# Patient Record
Sex: MALE | Race: WHITE | Employment: OTHER | URBAN - METROPOLITAN AREA
[De-identification: names, ages, dates, MRNs, and addresses within clinical notes are randomized per-mention and may not be internally consistent; named-entity substitution may affect disease eponyms.]

---

## 2017-01-12 ENCOUNTER — OFFICE VISIT (OUTPATIENT)
Dept: INTERNAL MEDICINE CLINIC | Facility: CLINIC | Age: 67
End: 2017-01-12

## 2017-01-12 VITALS
SYSTOLIC BLOOD PRESSURE: 149 MMHG | RESPIRATION RATE: 18 BRPM | HEART RATE: 62 BPM | DIASTOLIC BLOOD PRESSURE: 88 MMHG | WEIGHT: 238.19 LBS | BODY MASS INDEX: 34.1 KG/M2 | HEIGHT: 70 IN

## 2017-01-12 DIAGNOSIS — Z12.11 COLON CANCER SCREENING: ICD-10-CM

## 2017-01-12 DIAGNOSIS — Z71.89 COUNSELING REGARDING ADVANCED DIRECTIVES: ICD-10-CM

## 2017-01-12 DIAGNOSIS — Z23 NEED FOR VACCINATION: ICD-10-CM

## 2017-01-12 DIAGNOSIS — M19.90 OSTEOARTHRITIS, UNSPECIFIED OSTEOARTHRITIS TYPE, UNSPECIFIED SITE: ICD-10-CM

## 2017-01-12 DIAGNOSIS — E78.2 MIXED HYPERLIPIDEMIA: ICD-10-CM

## 2017-01-12 DIAGNOSIS — F17.290 CIGAR SMOKER: ICD-10-CM

## 2017-01-12 DIAGNOSIS — Z00.00 MEDICARE ANNUAL WELLNESS VISIT, INITIAL: Primary | ICD-10-CM

## 2017-01-12 PROCEDURE — G0438 PPPS, INITIAL VISIT: HCPCS | Performed by: INTERNAL MEDICINE

## 2017-01-12 PROCEDURE — G0009 ADMIN PNEUMOCOCCAL VACCINE: HCPCS | Performed by: INTERNAL MEDICINE

## 2017-01-12 PROCEDURE — 90670 PCV13 VACCINE IM: CPT | Performed by: INTERNAL MEDICINE

## 2017-01-12 PROCEDURE — 99497 ADVNCD CARE PLAN 30 MIN: CPT | Performed by: INTERNAL MEDICINE

## 2017-01-12 RX ORDER — ASPIRIN 81 MG/1
81 TABLET ORAL DAILY
Qty: 30 TABLET | Refills: 11 | Status: ON HOLD | OUTPATIENT
Start: 2017-01-12 | End: 2020-03-01

## 2017-01-12 NOTE — PROGRESS NOTES
HPI:   Cintia Salomon is a 74year old male who presents for a Medicare Initial Preventative Physical Exam (Welcome to Medicare- < 12 months on Medicare).     His last annual assessment has been over 1 year: Annual Physical due on 03/29/1952         [facility] not use illicit drugs.        EXAM:   /88 mmHg  Pulse 62  Resp 18  Ht 5' 10\" (1.778 m)  Wt 238 lb 3.2 oz (108.047 kg)  BMI 34.18 kg/m2  Estimated body mass index is 34.18 kg/(m^2) as calculated from the following:    Height as of this encounter: 5' 1 Influenza, Pneumococcal, Zoster, Tetanus     Immunization History   Administered Date(s) Administered   • Influenza 10/18/2005, 11/25/2006, 11/02/2007   • MMR 02/02/2015   • TDAP 07/01/2016       SEE Dori     In 0-No    Fall/Risk Scorin    Scoring Interpretation: 0 - 3 No Risk     Depression Screening (PHQ-2/PHQ-9): Over the LAST 2 WEEKS   Little interest or pleasure in doing things (over the last two weeks)?: Not at all    Feeling down, depressed, or hopeless PSA  Annually PSA due on 09/02/2017  Update Health Maintenance if applicable   Immunizations      Influenza No orders found for this or any previous visit.  Update Immunization Activity if applicable    Pneumoccocal 13 (Prevnar)   Orders placed or perfor and unexpected weight change. HENT: Negative for congestion, ear pain and postnasal drip. Eyes: Negative. Negative for pain, discharge and visual disturbance. Respiratory: Negative for cough, chest tightness, shortness of breath and wheezing.     Ca exudate. Eyes: Conjunctivae and EOM are normal. Pupils are equal, round, and reactive to light. Right eye exhibits no discharge. Neck: Normal range of motion. Neck supple. No thyromegaly present.    Cardiovascular: Normal rate, regular rhythm, normal he vaccination  - pneumococcal 13-Maya Conj Vacc (PREVNAR 13) Intramuscular Suspension    6. Cigar smoker  He is weaning. He is down to 1 daily. He understands the need to quit.     7. Colon cancer screening  - GI referral, Dr. Candelario Host       YO#[id number]

## 2017-01-12 NOTE — PATIENT INSTRUCTIONS
Ask pharmacist for Shingles vaccine. Follow a low carbohydrate diet. Eat no more than 100 gm of carbs daily. Recommended Websites for Advanced Directives    [email or web address]. org/publications/Documents/personal_[email or web address]. pdf  An information packet, incl

## 2017-01-13 ENCOUNTER — TELEPHONE (OUTPATIENT)
Dept: GASTROENTEROLOGY | Facility: CLINIC | Age: 67
End: 2017-01-13

## 2017-01-13 NOTE — TELEPHONE ENCOUNTER
Last Procedure: 10/9/2008    Last Diagnosis:  Personal history of adenomatous colon polyps  Recalled for (years): 10 years  Sedation used previously:  IV sedation  Last Prep Used (if known):    Quality of prep (if known):  Prep was excellent  Anticoagulant

## 2017-02-06 ENCOUNTER — TELEPHONE (OUTPATIENT)
Dept: INTERNAL MEDICINE CLINIC | Facility: CLINIC | Age: 67
End: 2017-02-06

## 2017-02-06 NOTE — TELEPHONE ENCOUNTER
Patient was informed with understanding. Appointment made for 2/7/`7 at 3:20 pm.     Denies fever, sob, chest pain/discomfort or other acute s/sx of distress. Call back or go straight to ER if s/sx worsen, questions or concerns.  Patient verbalized under

## 2017-02-06 NOTE — TELEPHONE ENCOUNTER
Reason for Call/Chief Complaint: Head congestion, cough  Onset: One week ago  Nursing Assessment/Associated Symptoms: Pt states that he thought he had allergies as the symptoms began when he was in [place]. Took Claritin without relief.  C/o head congestion

## 2017-02-07 ENCOUNTER — OFFICE VISIT (OUTPATIENT)
Dept: INTERNAL MEDICINE CLINIC | Facility: CLINIC | Age: 67
End: 2017-02-07

## 2017-02-07 VITALS
HEART RATE: 61 BPM | SYSTOLIC BLOOD PRESSURE: 146 MMHG | HEIGHT: 70 IN | WEIGHT: 244.38 LBS | RESPIRATION RATE: 18 BRPM | DIASTOLIC BLOOD PRESSURE: 94 MMHG | TEMPERATURE: 98 F | BODY MASS INDEX: 34.99 KG/M2

## 2017-02-07 DIAGNOSIS — J06.9 UPPER RESPIRATORY TRACT INFECTION, UNSPECIFIED TYPE: Primary | ICD-10-CM

## 2017-02-07 PROCEDURE — G0463 HOSPITAL OUTPT CLINIC VISIT: HCPCS | Performed by: INTERNAL MEDICINE

## 2017-02-07 PROCEDURE — 99213 OFFICE O/P EST LOW 20 MIN: CPT | Performed by: INTERNAL MEDICINE

## 2017-02-07 RX ORDER — CODEINE PHOSPHATE AND GUAIFENESIN 10; 100 MG/5ML; MG/5ML
5 SOLUTION ORAL EVERY 6 HOURS PRN
Qty: 180 ML | Refills: 0 | Status: SHIPPED | OUTPATIENT
Start: 2017-02-07 | End: 2017-02-17

## 2017-02-07 RX ORDER — AZITHROMYCIN 250 MG/1
TABLET, FILM COATED ORAL
Qty: 6 TABLET | Refills: 0 | Status: SHIPPED | OUTPATIENT
Start: 2017-02-07 | End: 2017-08-28 | Stop reason: ALTCHOICE

## 2017-02-07 NOTE — PROGRESS NOTES
HPI:    Patient ID: Brynn Kennedy is a 74year old male. Cough  This is a new problem. The current episode started in the past 7 days. The problem has been unchanged. The problem occurs every few minutes. The cough is productive of sputum.  Associated s ASSESSMENT/PLAN:   1. Upper respiratory tract infection, unspecified type  - azithromycin (ZITHROMAX Z-FAROOQ) 250 MG Oral Tab; Take two tablets by mouth today, then one tablet daily. Dispense: 6 tablet;  Refill: 0  - guaiFENesin-codeine (CHERATUSSIN AC) 10

## 2017-02-16 ENCOUNTER — AUDIOLOGY DOCUMENTATION (OUTPATIENT)
Dept: AUDIOLOGY | Facility: CLINIC | Age: 67
End: 2017-02-16

## 2017-02-16 NOTE — TELEPHONE ENCOUNTER
Patient left message on voice mail for Audiology. He got our name from [facility]. He has Phonak aids that are 14 years old and not working. Inquired about next steps.    Advised that we can complete hearing aid repair through  and cost is $30

## 2017-02-28 ENCOUNTER — APPOINTMENT (OUTPATIENT)
Dept: LAB | Facility: HOSPITAL | Age: 67
End: 2017-02-28
Attending: UROLOGY
Payer: MEDICARE

## 2017-02-28 ENCOUNTER — OFFICE VISIT (OUTPATIENT)
Dept: SURGERY | Facility: CLINIC | Age: 67
End: 2017-02-28

## 2017-02-28 VITALS
HEIGHT: 70 IN | SYSTOLIC BLOOD PRESSURE: 140 MMHG | HEART RATE: 64 BPM | WEIGHT: 235 LBS | DIASTOLIC BLOOD PRESSURE: 90 MMHG | BODY MASS INDEX: 33.64 KG/M2 | RESPIRATION RATE: 16 BRPM | TEMPERATURE: 98 F

## 2017-02-28 DIAGNOSIS — R97.20 ELEVATED PSA: ICD-10-CM

## 2017-02-28 DIAGNOSIS — R97.20 ELEVATED PSA: Primary | ICD-10-CM

## 2017-02-28 LAB — PSA SERPL-MCNC: 3.3 NG/ML (ref 0–4)

## 2017-02-28 PROCEDURE — 99214 OFFICE O/P EST MOD 30 MIN: CPT | Performed by: UROLOGY

## 2017-02-28 PROCEDURE — 84153 ASSAY OF PSA TOTAL: CPT

## 2017-02-28 PROCEDURE — 36415 COLL VENOUS BLD VENIPUNCTURE: CPT

## 2017-02-28 PROCEDURE — G0463 HOSPITAL OUTPT CLINIC VISIT: HCPCS | Performed by: UROLOGY

## 2017-02-28 NOTE — PROGRESS NOTES
[address] Patient Status:  Outpatient    3/29/1950 MRN [id number]   Location [address] [place] [address] Attending Elena Austin.  [address] Day #  PCP MD Shahnaz Mahoney is a 74 year Oral Tab Take two tablets by mouth today, then one tablet daily. Disp: 6 tablet Rfl: 0   aspirin 81 MG Oral Tab EC Take 1 tablet (81 mg total) by mouth daily.  Disp: 30 tablet Rfl: 11   Atorvastatin Calcium 20 MG Oral Tab Take 1 tablet (20 mg total) by mout long-standing prostate nodule for over 10 years prominent right to mid base nodule 1.5 cm diameter raised above the remainder of the prostate again it does not cross the midline or escape outside the prostate confines.   On closer questioning patient has mi The patient is ever bothered by his urination we could consider uroflow bladder ultrasound and possible medical therapy over patient has no voiding complaints I answered all the patient's questions spent 30 minutes with patient and well over half time in f

## 2017-03-23 ENCOUNTER — TELEPHONE (OUTPATIENT)
Dept: INTERNAL MEDICINE CLINIC | Facility: CLINIC | Age: 67
End: 2017-03-23

## 2017-03-23 DIAGNOSIS — E78.2 MIXED HYPERLIPIDEMIA: Primary | ICD-10-CM

## 2017-03-27 RX ORDER — FENOFIBRATE 160 MG/1
TABLET ORAL
Qty: 90 TABLET | Refills: 0 | Status: SHIPPED | OUTPATIENT
Start: 2017-03-27 | End: 2017-06-28

## 2017-05-02 DIAGNOSIS — E78.2 MIXED HYPERLIPIDEMIA: Primary | ICD-10-CM

## 2017-05-03 NOTE — TELEPHONE ENCOUNTER
Pharmacy calling to inquire on status. Indicates that original request was sent on Saturday. Current Outpatient Prescriptions:  Atorvastatin Calcium 20 MG Oral Tab Take 1 tablet (20 mg total) by mouth once daily.  Disp: 90 tablet Rfl: 1

## 2017-05-08 RX ORDER — ATORVASTATIN CALCIUM 20 MG/1
20 TABLET, FILM COATED ORAL
Qty: 90 TABLET | Refills: 1 | Status: SHIPPED | OUTPATIENT
Start: 2017-05-08 | End: 2017-08-28

## 2017-06-26 ENCOUNTER — TELEPHONE (OUTPATIENT)
Dept: INTERNAL MEDICINE CLINIC | Facility: CLINIC | Age: 67
End: 2017-06-26

## 2017-06-26 DIAGNOSIS — E78.2 MIXED HYPERLIPIDEMIA: ICD-10-CM

## 2017-06-26 NOTE — TELEPHONE ENCOUNTER
Pt spouse states would like to know why this medication below  is every 3 month oppose to yearly.     Current Outpatient Prescriptions:   •  Fenofibrate 160 MG Oral Tab, TAKE 1 TABLET BY MOUTH EVERY EVENING, Disp: 90 tablet, Rfl: 0

## 2017-06-26 NOTE — TELEPHONE ENCOUNTER
[facility]  Current Outpatient Prescriptions:  Fenofibrate 160 MG Oral Tab TAKE 1 TABLET BY MOUTH EVERY EVENING Disp: 90 tablet Rfl: 0

## 2017-06-27 NOTE — TELEPHONE ENCOUNTER
lmtcb [id number] or [id number]  for spouse Didi Gonzalez to explain to her that nurses can only send it for 90days but I can send the request to  to approve for 1 year.  do you approve 1 year for the Fenofibrate?

## 2017-06-28 RX ORDER — FENOFIBRATE 160 MG/1
160 TABLET ORAL DAILY
Qty: 90 TABLET | Refills: 0 | Status: CANCELLED | OUTPATIENT
Start: 2017-06-28

## 2017-06-28 RX ORDER — FENOFIBRATE 160 MG/1
TABLET ORAL
Qty: 90 TABLET | Refills: 0 | Status: SHIPPED | OUTPATIENT
Start: 2017-06-28 | End: 2017-08-28

## 2017-06-28 RX ORDER — FENOFIBRATE 160 MG/1
TABLET ORAL
Qty: 90 TABLET | Refills: 2 | Status: CANCELLED | OUTPATIENT
Start: 2017-06-28

## 2017-06-28 NOTE — TELEPHONE ENCOUNTER
I refill for 6 months at a time in order to remind both the patient and myself that they are due for an appointment. Mr. Sindy Lopes is due for an appointment at this time. Please schedule a routine f/u. I like to see him every 6 months.   OK to send for 3 peggy

## 2017-06-30 NOTE — TELEPHONE ENCOUNTER
Spoke to pt, informed him that  advised that he be seen for f/u every 6 months. Pt is out of town but will call when he returns to schedule an appt. He was notified that a 90 supply was sent for now. He voices understanding and agrees with plan.

## 2017-08-28 ENCOUNTER — OFFICE VISIT (OUTPATIENT)
Dept: INTERNAL MEDICINE CLINIC | Facility: CLINIC | Age: 67
End: 2017-08-28

## 2017-08-28 VITALS
BODY MASS INDEX: 34.65 KG/M2 | DIASTOLIC BLOOD PRESSURE: 80 MMHG | SYSTOLIC BLOOD PRESSURE: 132 MMHG | WEIGHT: 242 LBS | HEIGHT: 70 IN | RESPIRATION RATE: 18 BRPM | HEART RATE: 66 BPM

## 2017-08-28 DIAGNOSIS — J30.1 CHRONIC ALLERGIC RHINITIS DUE TO POLLEN, UNSPECIFIED SEASONALITY: ICD-10-CM

## 2017-08-28 DIAGNOSIS — M19.90 OSTEOARTHRITIS, UNSPECIFIED OSTEOARTHRITIS TYPE, UNSPECIFIED SITE: ICD-10-CM

## 2017-08-28 DIAGNOSIS — E78.2 MIXED HYPERLIPIDEMIA: Primary | ICD-10-CM

## 2017-08-28 PROCEDURE — G0463 HOSPITAL OUTPT CLINIC VISIT: HCPCS | Performed by: INTERNAL MEDICINE

## 2017-08-28 PROCEDURE — 99214 OFFICE O/P EST MOD 30 MIN: CPT | Performed by: INTERNAL MEDICINE

## 2017-08-28 RX ORDER — ATORVASTATIN CALCIUM 20 MG/1
20 TABLET, FILM COATED ORAL
Qty: 90 TABLET | Refills: 1 | Status: SHIPPED | OUTPATIENT
Start: 2017-08-28 | End: 2018-05-07

## 2017-08-28 RX ORDER — FENOFIBRATE 160 MG/1
TABLET ORAL
Qty: 90 TABLET | Refills: 1 | Status: SHIPPED | OUTPATIENT
Start: 2017-08-28 | End: 2018-04-14

## 2017-08-28 NOTE — PROGRESS NOTES
HPI:    Patient ID: Andre Townsend is a 69year old male. His allergies have been bad. Claritin helps. He has not been exercising and he has gained weight. Hyperlipidemia   This is a chronic problem.  The current episode started more than 1 year a Oral Tab EC Take 1 tablet (81 mg total) by mouth daily. Disp: 30 tablet Rfl: 11   Sildenafil Citrate (VIAGRA) 100 MG Oral Tab Take 1 tablet (100 mg total) by mouth daily as needed for Erectile Dysfunction.  Disp: 8 tablet Rfl: 11   Desloratadine (CLARINEX) ASSESSMENT/PLAN:     Problem List Items Addressed This Visit     Mixed hyperlipidemia - Primary     Controlled. Continue present management.          Relevant Medications    Fenofibrate 160 MG Oral Tab    atorvastatin 20 MG Oral Tab    Other R

## 2017-08-29 ENCOUNTER — OFFICE VISIT (OUTPATIENT)
Dept: SURGERY | Facility: CLINIC | Age: 67
End: 2017-08-29

## 2017-08-29 VITALS
HEART RATE: 77 BPM | TEMPERATURE: 98 F | BODY MASS INDEX: 34.36 KG/M2 | HEIGHT: 70 IN | WEIGHT: 240 LBS | DIASTOLIC BLOOD PRESSURE: 88 MMHG | SYSTOLIC BLOOD PRESSURE: 129 MMHG | RESPIRATION RATE: 16 BRPM

## 2017-08-29 DIAGNOSIS — R97.20 ELEVATED PSA: Primary | ICD-10-CM

## 2017-08-29 DIAGNOSIS — N40.0 BPH WITHOUT OBSTRUCTION/LOWER URINARY TRACT SYMPTOMS: ICD-10-CM

## 2017-08-29 PROCEDURE — G0463 HOSPITAL OUTPT CLINIC VISIT: HCPCS | Performed by: UROLOGY

## 2017-08-29 PROCEDURE — 99214 OFFICE O/P EST MOD 30 MIN: CPT | Performed by: UROLOGY

## 2017-08-29 NOTE — PROGRESS NOTES
[address] Patient Status:  Outpatient    3/29/1950 MRN [id number]   Location [address] [facility] [place] [place] Attending Diallo Montanez.  [address] Day # 0 PCP Hardik Esquivel MD       Sharon Adair is a 69 year Take 1 tablet (20 mg total) by mouth once daily. Disp: 90 tablet Rfl: 1   aspirin 81 MG Oral Tab EC Take 1 tablet (81 mg total) by mouth daily.  Disp: 30 tablet Rfl: 11   Sildenafil Citrate (VIAGRA) 100 MG Oral Tab Take 1 tablet (100 mg total) by mouth marycruz does not cross the midline escape outside the prostate confines on closer questioning patient in minimal if any other urological complaints no history of stone disease infections tumors or surgeries and AUA symptom score continues at a level of 0 where he

## 2017-09-13 ENCOUNTER — LAB ENCOUNTER (OUTPATIENT)
Dept: LAB | Facility: HOSPITAL | Age: 67
End: 2017-09-13
Attending: INTERNAL MEDICINE
Payer: MEDICARE

## 2017-09-13 DIAGNOSIS — E78.2 MIXED HYPERLIPIDEMIA: ICD-10-CM

## 2017-09-13 DIAGNOSIS — R97.20 ELEVATED PSA: ICD-10-CM

## 2017-09-13 DIAGNOSIS — N40.0 BPH WITHOUT OBSTRUCTION/LOWER URINARY TRACT SYMPTOMS: ICD-10-CM

## 2017-09-13 LAB
ALBUMIN SERPL BCP-MCNC: 4.4 G/DL (ref 3.5–4.8)
ALBUMIN/GLOB SERPL: 1.5 {RATIO} (ref 1–2)
ALP SERPL-CCNC: 37 U/L (ref 32–100)
ALT SERPL-CCNC: 35 U/L (ref 17–63)
ANION GAP SERPL CALC-SCNC: 7 MMOL/L (ref 0–18)
AST SERPL-CCNC: 24 U/L (ref 15–41)
BASOPHILS # BLD: 0 K/UL (ref 0–0.2)
BASOPHILS NFR BLD: 1 %
BILIRUB SERPL-MCNC: 0.7 MG/DL (ref 0.3–1.2)
BILIRUB UR QL: NEGATIVE
BUN SERPL-MCNC: 14 MG/DL (ref 8–20)
BUN/CREAT SERPL: 12.5 (ref 10–20)
CALCIUM SERPL-MCNC: 9.6 MG/DL (ref 8.5–10.5)
CHLORIDE SERPL-SCNC: 109 MMOL/L (ref 95–110)
CHOLEST SERPL-MCNC: 167 MG/DL (ref 110–200)
CLARITY UR: CLEAR
CO2 SERPL-SCNC: 24 MMOL/L (ref 22–32)
COLOR UR: YELLOW
CREAT SERPL-MCNC: 1.12 MG/DL (ref 0.5–1.5)
EOSINOPHIL # BLD: 0.3 K/UL (ref 0–0.7)
EOSINOPHIL NFR BLD: 4 %
ERYTHROCYTE [DISTWIDTH] IN BLOOD BY AUTOMATED COUNT: 13.6 % (ref 11–15)
GLOBULIN PLAS-MCNC: 2.9 G/DL (ref 2.5–3.7)
GLUCOSE SERPL-MCNC: 143 MG/DL (ref 70–99)
GLUCOSE UR-MCNC: NEGATIVE MG/DL
HCT VFR BLD AUTO: 47.9 % (ref 41–52)
HDLC SERPL-MCNC: 27 MG/DL
HGB BLD-MCNC: 16 G/DL (ref 13.5–17.5)
HGB UR QL STRIP.AUTO: NEGATIVE
KETONES UR-MCNC: NEGATIVE MG/DL
LDLC SERPL CALC-MCNC: 99 MG/DL (ref 0–99)
LEUKOCYTE ESTERASE UR QL STRIP.AUTO: NEGATIVE
LYMPHOCYTES # BLD: 2.9 K/UL (ref 1–4)
LYMPHOCYTES NFR BLD: 33 %
MCH RBC QN AUTO: 30.2 PG (ref 27–32)
MCHC RBC AUTO-ENTMCNC: 33.4 G/DL (ref 32–37)
MCV RBC AUTO: 90.5 FL (ref 80–100)
MONOCYTES # BLD: 0.8 K/UL (ref 0–1)
MONOCYTES NFR BLD: 9 %
NEUTROPHILS # BLD AUTO: 4.9 K/UL (ref 1.8–7.7)
NEUTROPHILS NFR BLD: 55 %
NITRITE UR QL STRIP.AUTO: NEGATIVE
NONHDLC SERPL-MCNC: 140 MG/DL
OSMOLALITY UR CALC.SUM OF ELEC: 293 MOSM/KG (ref 275–295)
PH UR: 5 [PH] (ref 5–8)
PLATELET # BLD AUTO: 195 K/UL (ref 140–400)
PMV BLD AUTO: 9.8 FL (ref 7.4–10.3)
POTASSIUM SERPL-SCNC: 4.4 MMOL/L (ref 3.3–5.1)
PROT SERPL-MCNC: 7.3 G/DL (ref 5.9–8.4)
PROT UR-MCNC: NEGATIVE MG/DL
PSA SERPL-MCNC: 2.9 NG/ML (ref 0–4)
RBC # BLD AUTO: 5.29 M/UL (ref 4.5–5.9)
SODIUM SERPL-SCNC: 140 MMOL/L (ref 136–144)
SP GR UR STRIP: 1.01 (ref 1–1.03)
TRIGL SERPL-MCNC: 206 MG/DL (ref 1–149)
TSH SERPL-ACNC: 2.26 UIU/ML (ref 0.45–5.33)
UROBILINOGEN UR STRIP-ACNC: <2
VIT C UR-MCNC: NEGATIVE MG/DL
WBC # BLD AUTO: 8.9 K/UL (ref 4–11)

## 2017-09-13 PROCEDURE — 80053 COMPREHEN METABOLIC PANEL: CPT

## 2017-09-13 PROCEDURE — 84153 ASSAY OF PSA TOTAL: CPT

## 2017-09-13 PROCEDURE — 36415 COLL VENOUS BLD VENIPUNCTURE: CPT

## 2017-09-13 PROCEDURE — 81003 URINALYSIS AUTO W/O SCOPE: CPT

## 2017-09-13 PROCEDURE — 80061 LIPID PANEL: CPT

## 2017-09-13 PROCEDURE — 85025 COMPLETE CBC W/AUTO DIFF WBC: CPT

## 2017-09-13 PROCEDURE — 84443 ASSAY THYROID STIM HORMONE: CPT

## 2017-11-17 ENCOUNTER — PATIENT MESSAGE (OUTPATIENT)
Dept: INTERNAL MEDICINE CLINIC | Facility: CLINIC | Age: 67
End: 2017-11-17

## 2017-11-18 ENCOUNTER — NURSE ONLY (OUTPATIENT)
Dept: FAMILY MEDICINE CLINIC | Facility: CLINIC | Age: 67
End: 2017-11-18

## 2017-11-18 VITALS
TEMPERATURE: 98 F | SYSTOLIC BLOOD PRESSURE: 148 MMHG | HEART RATE: 100 BPM | OXYGEN SATURATION: 97 % | RESPIRATION RATE: 20 BRPM | DIASTOLIC BLOOD PRESSURE: 90 MMHG

## 2017-11-18 DIAGNOSIS — J06.9 UPPER RESPIRATORY TRACT INFECTION, UNSPECIFIED TYPE: Primary | ICD-10-CM

## 2017-11-18 DIAGNOSIS — J01.00 ACUTE NON-RECURRENT MAXILLARY SINUSITIS: ICD-10-CM

## 2017-11-18 PROBLEM — Z86.69 HX OF SLEEP APNEA: Status: ACTIVE | Noted: 2017-11-18

## 2017-11-18 PROCEDURE — 99203 OFFICE O/P NEW LOW 30 MIN: CPT

## 2017-11-18 RX ORDER — AMOXICILLIN 875 MG/1
875 TABLET, COATED ORAL 2 TIMES DAILY
Qty: 20 TABLET | Refills: 0 | Status: SHIPPED | OUTPATIENT
Start: 2017-11-18 | End: 2018-02-28 | Stop reason: ALTCHOICE

## 2017-11-18 NOTE — PROGRESS NOTES
CHIEF COMPLAINT:   Patient presents with:  URI: 3.5 weeks    HPI:   Valerio Ham is a 69year old male who presents with upper respiratory symptoms for  24  days.  Patient reports post nasal drainage, sore throat, congestion, yellow tp green thick colored • Heart Attack Father 50     Per NG: massive heart attack ( cause of death)   • Cancer Mother 67     Per NG: leukemia ( cause of death)   • Other [OTHER] Maternal Grandmother    • Other [OTHER] Maternal Grandfather    • Other Charline Gomes Paternal Grandmother Bárbara Andrade is a 69year old male who presents with upper respiratory symptoms that are consistent with    ASSESSMENT:   Upper respiratory tract infection, unspecified type  (primary encounter diagnosis)  Acute non-recurrent maxillary sinusitis    PLAN: · Over-the-counter decongestants may be used unless a similar medicine was prescribed. Nasal sprays work the fastest. Use one that contains phenylephrine or oxymetazoline. First blow the nose gently. Then use the spray.  Do not use these medicines more ofte © 9505-8680 [employer] [address]. [address] [facility], [facility] [place]. All rights reserved. This information is not intended as a substitute for professional medical care. Always follow your healthcare professional's instructions.           Use F

## 2017-11-20 ENCOUNTER — TELEPHONE (OUTPATIENT)
Dept: OTHER | Age: 67
End: 2017-11-20

## 2017-11-20 NOTE — TELEPHONE ENCOUNTER
From: Aria Nicolas  To: Jess Nguyen MD  Sent: 11/17/2017 4:29 PM CST  Subject: Other     I have been suffering with a severe cold for the past three weeks. The Symptoms are coughing, runny nose, sore throat, and bodyaches that come and go.  I was star

## 2017-11-20 NOTE — TELEPHONE ENCOUNTER
Patient states he went to walk in clinic and received an antibiotic and is feeling better. Advised patient to call back if symptoms do not improve. Patient verbalized understanding.        To: EM [facility] LPN/LUANNE      From: Tali Prakash      Created: 11/17/

## 2017-11-29 ENCOUNTER — TELEPHONE (OUTPATIENT)
Dept: INTERNAL MEDICINE CLINIC | Facility: CLINIC | Age: 67
End: 2017-11-29

## 2017-11-29 NOTE — TELEPHONE ENCOUNTER
Spouse would like to know if the doctor would like to see the patient in 6 months or a year after his last office visit.

## 2017-12-01 NOTE — TELEPHONE ENCOUNTER
I had asked him to come back in 6 months, which would be the end of February or early March. She can schedule that appointment for him now.

## 2018-02-12 ENCOUNTER — HOSPITAL ENCOUNTER (OUTPATIENT)
Dept: CT IMAGING | Age: 68
Discharge: HOME | End: 2018-02-12
Attending: INTERNAL MEDICINE

## 2018-02-12 DIAGNOSIS — Z13.9 SCREENING PROCEDURE: ICD-10-CM

## 2018-02-12 NOTE — PROGRESS NOTES
Pt seen at [facility], [facility] for CTHS:  PRELIMINARY RZRHP=2621.54  VC=188/86    Cholestec labs as follows:  GS=251  HDL=27  QNE=978  EW=939  GLUCOSE=129; non-fasting    All results and risk factors discussed with patient; all questions and concerns addressed.

## 2018-02-17 ENCOUNTER — TELEPHONE (OUTPATIENT)
Dept: INTERNAL MEDICINE CLINIC | Facility: CLINIC | Age: 68
End: 2018-02-17

## 2018-02-17 DIAGNOSIS — R07.2 PRECORDIAL PAIN: Primary | ICD-10-CM

## 2018-02-17 DIAGNOSIS — I70.90 ATHEROSCLEROSIS: ICD-10-CM

## 2018-02-17 NOTE — TELEPHONE ENCOUNTER
Called pt regarding abnormal Calcium heart test.  Pt says he does also have some chest pain. Will do stress thallium and f/u in the office. Will discuss the CT over read at that time.

## 2018-02-22 ENCOUNTER — HOSPITAL ENCOUNTER (OUTPATIENT)
Dept: ULTRASOUND IMAGING | Facility: HOSPITAL | Age: 68
Discharge: HOME | End: 2018-02-22
Attending: INTERNAL MEDICINE

## 2018-02-22 DIAGNOSIS — Z13.9 SPECIAL SCREENING: ICD-10-CM

## 2018-02-23 ENCOUNTER — HOSPITAL ENCOUNTER (OUTPATIENT)
Dept: NUCLEAR MEDICINE | Facility: HOSPITAL | Age: 68
Discharge: HOME | End: 2018-02-23
Attending: INTERNAL MEDICINE
Payer: MEDICARE

## 2018-02-23 ENCOUNTER — HOSPITAL ENCOUNTER (OUTPATIENT)
Dept: CV DIAGNOSTICS | Facility: HOSPITAL | Age: 68
Discharge: HOME | End: 2018-02-23
Attending: INTERNAL MEDICINE
Payer: MEDICARE

## 2018-02-23 DIAGNOSIS — R07.2 PRECORDIAL PAIN: ICD-10-CM

## 2018-02-23 DIAGNOSIS — I70.90 ATHEROSCLEROSIS: ICD-10-CM

## 2018-02-23 PROCEDURE — 93016 CV STRESS TEST SUPVJ ONLY: CPT | Performed by: INTERNAL MEDICINE

## 2018-02-23 PROCEDURE — 93017 CV STRESS TEST TRACING ONLY: CPT | Performed by: INTERNAL MEDICINE

## 2018-02-23 PROCEDURE — 93018 CV STRESS TEST I&R ONLY: CPT | Performed by: INTERNAL MEDICINE

## 2018-02-23 PROCEDURE — 78452 HT MUSCLE IMAGE SPECT MULT: CPT | Performed by: INTERNAL MEDICINE

## 2018-02-23 RX ORDER — SODIUM CHLORIDE 9 MG/ML
INJECTION, SOLUTION INTRAVENOUS
Status: COMPLETED
Start: 2018-02-23 | End: 2018-02-23

## 2018-02-23 RX ADMIN — SODIUM CHLORIDE 50 ML: 9 INJECTION, SOLUTION INTRAVENOUS at 13:15:00

## 2018-02-28 ENCOUNTER — OFFICE VISIT (OUTPATIENT)
Dept: INTERNAL MEDICINE CLINIC | Facility: CLINIC | Age: 68
End: 2018-02-28

## 2018-02-28 ENCOUNTER — TELEPHONE (OUTPATIENT)
Dept: GASTROENTEROLOGY | Facility: CLINIC | Age: 68
End: 2018-02-28

## 2018-02-28 VITALS
HEIGHT: 70 IN | HEART RATE: 67 BPM | SYSTOLIC BLOOD PRESSURE: 150 MMHG | BODY MASS INDEX: 35.12 KG/M2 | WEIGHT: 245.31 LBS | DIASTOLIC BLOOD PRESSURE: 80 MMHG

## 2018-02-28 DIAGNOSIS — Z87.891 EX-SMOKER: ICD-10-CM

## 2018-02-28 DIAGNOSIS — R93.1 ABNORMAL HEART SCORE CT: Primary | ICD-10-CM

## 2018-02-28 DIAGNOSIS — N40.2 PROSTATE NODULE: ICD-10-CM

## 2018-02-28 DIAGNOSIS — K22.89 ESOPHAGEAL THICKENING: ICD-10-CM

## 2018-02-28 DIAGNOSIS — Z23 NEED FOR VACCINATION: ICD-10-CM

## 2018-02-28 DIAGNOSIS — E78.2 MIXED HYPERLIPIDEMIA: ICD-10-CM

## 2018-02-28 PROCEDURE — G0009 ADMIN PNEUMOCOCCAL VACCINE: HCPCS | Performed by: INTERNAL MEDICINE

## 2018-02-28 PROCEDURE — G0463 HOSPITAL OUTPT CLINIC VISIT: HCPCS | Performed by: INTERNAL MEDICINE

## 2018-02-28 PROCEDURE — 90732 PPSV23 VACC 2 YRS+ SUBQ/IM: CPT | Performed by: INTERNAL MEDICINE

## 2018-02-28 PROCEDURE — 99214 OFFICE O/P EST MOD 30 MIN: CPT | Performed by: INTERNAL MEDICINE

## 2018-02-28 RX ORDER — METOPROLOL SUCCINATE 25 MG/1
25 TABLET, EXTENDED RELEASE ORAL DAILY
Qty: 30 TABLET | Refills: 5 | Status: ON HOLD | OUTPATIENT
Start: 2018-02-28 | End: 2018-04-11 | Stop reason: CLARIF

## 2018-02-28 NOTE — TELEPHONE ENCOUNTER
Pt calling to [facility] cln/procedure, pt informed about the 72 hr cb, pls call at:[phone],thanks.

## 2018-02-28 NOTE — ASSESSMENT & PLAN NOTE
Patient will follow up with  for one last appointment and then after he retires he will see Dr. Octavia Victoria or Dr. Bryce Forbes.

## 2018-02-28 NOTE — ASSESSMENT & PLAN NOTE
Patient had a score of over 800 in his LAD. He did a chemical stress test which was normal.  I will refer him to cardiology and start him on metoprolol. Continue aspirin.

## 2018-02-28 NOTE — ASSESSMENT & PLAN NOTE
There was esophageal thickening seen on his recent CAT scan.   I advised him to see the gastroenterologist.

## 2018-02-28 NOTE — PROGRESS NOTES
HPI:    Patient ID: eZeshan William is a 69year old male. Pt had an abnormal CT heart calcium score, but normal stress test.  He has hyperlipidemia. He takes Cialis for erectile dysfunction and he plans to go scuba diving.   He wonders if these things ENDOSCOPY,BIOPSY         Current Outpatient Prescriptions:  Metoprolol Succinate ER 25 MG Oral Tablet 24 Hr Take 1 tablet (25 mg total) by mouth daily.  Disp: 30 tablet Rfl: 5   Fenofibrate 160 MG Oral Tab TAKE 1 TABLET BY MOUTH EVERY EVENING Disp: 90 table well-developed and well-nourished. HENT:   Head: Normocephalic and atraumatic. Eyes: EOM are normal.   Cardiovascular: Normal rate, regular rhythm and normal heart sounds. No murmur heard.   Pulmonary/Chest: Effort normal and breath sounds normal. No

## 2018-03-01 NOTE — TELEPHONE ENCOUNTER
The patient's chart has been reviewed. Okay to schedule pt for 10 year colonoscopy recall r/t history of adenomatous colon polyps with Dr. Douglas Bo sedation with dose Colyte or equivalent (eRx) preparation.      May continue all me

## 2018-03-01 NOTE — TELEPHONE ENCOUNTER
Pt contacted and he states that he is aware that his recall is not until 10/2018, but states that he would prefer to schedule now for a date in October \"since Dr. Alix Black books out so soon. \"    Meds, allergies, pharmacy, and sxs reviewed.      Last P

## 2018-03-01 NOTE — TELEPHONE ENCOUNTER
Spoke with pt, he would like to schedule CLN for the 1st week of October. That schedule is not added in yet. I told the pt that I will CB as soon as the schedule is entered in to get him added on. Pt verbalized understanding.

## 2018-03-02 ENCOUNTER — PRIOR ORIGINAL RECORDS (OUTPATIENT)
Dept: OTHER | Age: 68
End: 2018-03-02

## 2018-03-05 ENCOUNTER — OFFICE VISIT (OUTPATIENT)
Dept: SURGERY | Facility: CLINIC | Age: 68
End: 2018-03-05

## 2018-03-05 ENCOUNTER — APPOINTMENT (OUTPATIENT)
Dept: LAB | Facility: HOSPITAL | Age: 68
End: 2018-03-05
Attending: UROLOGY
Payer: MEDICARE

## 2018-03-05 VITALS
WEIGHT: 245 LBS | SYSTOLIC BLOOD PRESSURE: 138 MMHG | BODY MASS INDEX: 35.07 KG/M2 | DIASTOLIC BLOOD PRESSURE: 90 MMHG | RESPIRATION RATE: 16 BRPM | HEIGHT: 70 IN | HEART RATE: 80 BPM

## 2018-03-05 DIAGNOSIS — R97.20 ELEVATED PSA: ICD-10-CM

## 2018-03-05 DIAGNOSIS — R97.20 ELEVATED PSA: Primary | ICD-10-CM

## 2018-03-05 LAB — PSA SERPL-MCNC: 3.1 NG/ML (ref 0–4)

## 2018-03-05 PROCEDURE — 84153 ASSAY OF PSA TOTAL: CPT

## 2018-03-05 PROCEDURE — 36415 COLL VENOUS BLD VENIPUNCTURE: CPT

## 2018-03-05 PROCEDURE — G0463 HOSPITAL OUTPT CLINIC VISIT: HCPCS | Performed by: UROLOGY

## 2018-03-05 PROCEDURE — 99214 OFFICE O/P EST MOD 30 MIN: CPT | Performed by: UROLOGY

## 2018-03-05 NOTE — PROGRESS NOTES
[address] Patient Status:  Outpatient    3/29/1950 MRN [id number]   Location [facility] [place] Attending Jl Jay.   [address] Day # 0 PCP MD Bárbara Barbosa is a 69 year 1 TABLET BY MOUTH EVERY DAY Disp: 30 tablet Rfl: 0   Omeprazole 40 MG Oral Capsule Delayed Release Take 1 capsule by mouth twice daily before breakfast and at supper Disp: 180 capsule Rfl: 1       No outpatient prescriptions have been marked as taking for weak stream straining or nocturia and is completely unbothered by urination. However on closer questioning additional risk factor was found that included a family history of prostate cancer in the patient's brother.   Because of patient's multiple risk fac with today's PSA blood test as soon as it becomes available          [employer].  Marshal Webster MD  3/5/2018

## 2018-03-14 ENCOUNTER — TELEPHONE (OUTPATIENT)
Dept: GASTROENTEROLOGY | Facility: CLINIC | Age: 68
End: 2018-03-14

## 2018-03-14 ENCOUNTER — OFFICE VISIT (OUTPATIENT)
Dept: GASTROENTEROLOGY | Facility: CLINIC | Age: 68
End: 2018-03-14

## 2018-03-14 VITALS
SYSTOLIC BLOOD PRESSURE: 144 MMHG | HEIGHT: 70 IN | DIASTOLIC BLOOD PRESSURE: 93 MMHG | HEART RATE: 68 BPM | BODY MASS INDEX: 34.93 KG/M2 | WEIGHT: 244 LBS

## 2018-03-14 DIAGNOSIS — Z12.11 SCREENING FOR COLORECTAL CANCER: ICD-10-CM

## 2018-03-14 DIAGNOSIS — K21.9 GASTROESOPHAGEAL REFLUX DISEASE, ESOPHAGITIS PRESENCE NOT SPECIFIED: Primary | ICD-10-CM

## 2018-03-14 DIAGNOSIS — K21.00 GASTROESOPHAGEAL REFLUX DISEASE WITH ESOPHAGITIS: ICD-10-CM

## 2018-03-14 DIAGNOSIS — R93.3 ABNORMAL CT SCAN, ESOPHAGUS: Primary | ICD-10-CM

## 2018-03-14 DIAGNOSIS — Z12.12 SCREENING FOR COLORECTAL CANCER: ICD-10-CM

## 2018-03-14 DIAGNOSIS — R93.3 ABNORMAL CT SCAN, ESOPHAGUS: ICD-10-CM

## 2018-03-14 DIAGNOSIS — R13.10 DYSPHAGIA, UNSPECIFIED TYPE: ICD-10-CM

## 2018-03-14 DIAGNOSIS — Z12.11 SCREENING FOR COLON CANCER: ICD-10-CM

## 2018-03-14 PROCEDURE — G0463 HOSPITAL OUTPT CLINIC VISIT: HCPCS | Performed by: INTERNAL MEDICINE

## 2018-03-14 PROCEDURE — 99213 OFFICE O/P EST LOW 20 MIN: CPT | Performed by: INTERNAL MEDICINE

## 2018-03-14 RX ORDER — POLYETHYLENE GLYCOL 3350, SODIUM CHLORIDE, SODIUM BICARBONATE, POTASSIUM CHLORIDE 420; 11.2; 5.72; 1.48 G/4L; G/4L; G/4L; G/4L
4 POWDER, FOR SOLUTION ORAL ONCE
Qty: 4000 ML | Refills: 0 | Status: SHIPPED | OUTPATIENT
Start: 2018-03-14 | End: 2018-03-14

## 2018-03-14 NOTE — PATIENT INSTRUCTIONS
Schedule upper endoscopy and colonoscopy for reflux, abnormal CT scan of the esophagus, difficulty swallowing and colorectal cancer screening. TriLyte. IV sedation.

## 2018-03-14 NOTE — TELEPHONE ENCOUNTER
Scheduled for:  Colonoscopy [id number] / [phone]  Provider Name: Dr. Melody Reeves  Date:  4/11/18  Location:  [facility]  Sedation:  IV  Time:  10:45 am, arrival 9:45 am   Prep: Trilyte  Meds/Allergies Reconciled?:  Physician reviewed  Diagnosis with codes:  Reflux K21.9, abn

## 2018-03-14 NOTE — PROGRESS NOTES
HPI:    Patient ID: Prudencio Lazaro is a 69year old male. [facility] was last seen in December 2015 at the request of Dr. Flako Tobias. As per previous notes he has a history of chronic peptic ulcer disease.   Endoscopy in 1996 revealed a deformed duodena BY MOUTH EVERY EVENING Disp: 90 tablet Rfl: 1   atorvastatin 20 MG Oral Tab Take 1 tablet (20 mg total) by mouth once daily. Disp: 90 tablet Rfl: 1   aspirin 81 MG Oral Tab EC Take 1 tablet (81 mg total) by mouth daily.  Disp: 30 tablet Rfl: 11   Sildenafil SCORING OVER READ     COMPARISON: None available. INDICATIONS:  Encounter for screening for coronary artery disease. TECHNIQUE:    CT calcium score was performed.   The raw data from that study was reprocessed into images of the mediastinum and lung dictated cardiologist report for further details. 2. No acute intrathoracic process in the imaged volume. 3. Mild ascending thoracic aortic ectasia. 4. Marked hepatic steatosis. 5. Mild distal esophageal wall thickening.      6. Lesser incid chest for calcium scoring. He was found to have a thickened distal esophagus. We have discussed that this is likely due to reflux esophagitis. Under distention would be an additional consideration.   Neoplasm is felt to be less likely but cannot be exclu

## 2018-04-11 ENCOUNTER — HOSPITAL ENCOUNTER (OUTPATIENT)
Facility: HOSPITAL | Age: 68
Setting detail: HOSPITAL OUTPATIENT SURGERY
Discharge: HOME | End: 2018-04-11
Attending: INTERNAL MEDICINE | Admitting: INTERNAL MEDICINE
Payer: MEDICARE

## 2018-04-11 ENCOUNTER — SURGERY (OUTPATIENT)
Age: 68
End: 2018-04-11

## 2018-04-11 VITALS
HEART RATE: 52 BPM | WEIGHT: 237 LBS | OXYGEN SATURATION: 96 % | SYSTOLIC BLOOD PRESSURE: 143 MMHG | HEIGHT: 69.75 IN | DIASTOLIC BLOOD PRESSURE: 82 MMHG | BODY MASS INDEX: 34.31 KG/M2 | RESPIRATION RATE: 14 BRPM

## 2018-04-11 DIAGNOSIS — K21.9 GASTROESOPHAGEAL REFLUX DISEASE, ESOPHAGITIS PRESENCE NOT SPECIFIED: ICD-10-CM

## 2018-04-11 DIAGNOSIS — R13.10 DYSPHAGIA, UNSPECIFIED TYPE: ICD-10-CM

## 2018-04-11 DIAGNOSIS — Z12.11 SCREENING FOR COLON CANCER: ICD-10-CM

## 2018-04-11 DIAGNOSIS — R93.3 ABNORMAL CT SCAN, ESOPHAGUS: ICD-10-CM

## 2018-04-11 PROCEDURE — 43239 EGD BIOPSY SINGLE/MULTIPLE: CPT | Performed by: INTERNAL MEDICINE

## 2018-04-11 PROCEDURE — 0DB68ZX EXCISION OF STOMACH, VIA NATURAL OR ARTIFICIAL OPENING ENDOSCOPIC, DIAGNOSTIC: ICD-10-PCS | Performed by: INTERNAL MEDICINE

## 2018-04-11 PROCEDURE — G0121 COLON CA SCRN NOT HI RSK IND: HCPCS | Performed by: INTERNAL MEDICINE

## 2018-04-11 PROCEDURE — 0DJD8ZZ INSPECTION OF LOWER INTESTINAL TRACT, VIA NATURAL OR ARTIFICIAL OPENING ENDOSCOPIC: ICD-10-PCS | Performed by: INTERNAL MEDICINE

## 2018-04-11 PROCEDURE — G0500 MOD SEDAT ENDO SERVICE >5YRS: HCPCS | Performed by: INTERNAL MEDICINE

## 2018-04-11 PROCEDURE — 0DB78ZX EXCISION OF STOMACH, PYLORUS, VIA NATURAL OR ARTIFICIAL OPENING ENDOSCOPIC, DIAGNOSTIC: ICD-10-PCS | Performed by: INTERNAL MEDICINE

## 2018-04-11 RX ORDER — MIDAZOLAM HYDROCHLORIDE 1 MG/ML
INJECTION INTRAMUSCULAR; INTRAVENOUS
Status: DISCONTINUED | OUTPATIENT
Start: 2018-04-11 | End: 2018-04-11

## 2018-04-11 RX ORDER — SODIUM CHLORIDE 0.9 % (FLUSH) 0.9 %
10 SYRINGE (ML) INJECTION AS NEEDED
Status: DISCONTINUED | OUTPATIENT
Start: 2018-04-11 | End: 2018-04-11

## 2018-04-11 RX ORDER — SODIUM CHLORIDE, SODIUM LACTATE, POTASSIUM CHLORIDE, CALCIUM CHLORIDE 600; 310; 30; 20 MG/100ML; MG/100ML; MG/100ML; MG/100ML
INJECTION, SOLUTION INTRAVENOUS CONTINUOUS
Status: DISCONTINUED | OUTPATIENT
Start: 2018-04-11 | End: 2018-04-11

## 2018-04-11 RX ORDER — MIDAZOLAM HYDROCHLORIDE 1 MG/ML
1 INJECTION INTRAMUSCULAR; INTRAVENOUS EVERY 5 MIN PRN
Status: DISCONTINUED | OUTPATIENT
Start: 2018-04-11 | End: 2018-04-11

## 2018-04-11 NOTE — OPERATIVE REPORT
[facility] Endoscopy Report      Date of Procedure:  04/11/18      Preoperative Diagnosis:  1. Colorectal cancer screening  2. Abnormal CT scan of the esophagus (thickening)  3.   Chronic gastroesophageal reflux with history of erosive eso rectum and advanced under direct vision by following the lumen to the terminal ileum. The colon was examined upon withdrawal in the left lateral recumbent position.     Following colonoscopy, an additional 25 mcg of fentanyl and 3 mg of midazolam, an Olymp Otherwise normal screening colonoscopy to the terminal ileum  3. Small hiatal hernia  4. Gastric erosions likely aspirin induced. Recommendations:  1. High-fiber diet. 2.  Follow-up biopsy results.   3.  Repeat colonoscopy to be based on a new lower

## 2018-04-12 NOTE — H&P
History & Physical Examination    Patient Name: Renée Oneal  MRN: [id number]  [facility]: [id number]  YOB: 1950    Diagnosis: Colorectal cancer screening, abnormal CT scan of the esophagus (thickening), chronic gastroesophageal reflux with histor Normal Check if Physical Exam is Normal If not normal, please explain:   TAMIE Vidales ] [ Susan Vidales ] [ X]    HEART Flash ] [ Arik Vidales ] [ Megan Innocent Flash ] [ Marquez Vidales ] [ Olesya Lozanoer        [ x ] I have discussed the risks and benefits an

## 2018-04-14 DIAGNOSIS — E78.2 MIXED HYPERLIPIDEMIA: ICD-10-CM

## 2018-04-14 RX ORDER — FENOFIBRATE 160 MG/1
TABLET ORAL
Qty: 90 TABLET | Refills: 1 | Status: SHIPPED | OUTPATIENT
Start: 2018-04-14 | End: 2018-10-05

## 2018-05-07 ENCOUNTER — TELEPHONE (OUTPATIENT)
Dept: OTHER | Age: 68
End: 2018-05-07

## 2018-05-07 DIAGNOSIS — E78.2 MIXED HYPERLIPIDEMIA: ICD-10-CM

## 2018-05-07 RX ORDER — ATORVASTATIN CALCIUM 20 MG/1
TABLET, FILM COATED ORAL
Qty: 90 TABLET | Refills: 0 | Status: SHIPPED | OUTPATIENT
Start: 2018-05-07 | End: 2018-07-29

## 2018-05-07 NOTE — TELEPHONE ENCOUNTER
Spoke to pt, states that for about 6 months he has had intermittent leg cramps. They have been getting more frequent and are lasting longer. He has been getting cramps to his hands as well. Cramps wake him at night sometimes.    Pt feels that he drinks enou

## 2018-05-07 NOTE — TELEPHONE ENCOUNTER
Left message for patient to call back so we can triage symptoms he stated in the mychart message regarding muscle spasms/cramps.

## 2018-05-10 ENCOUNTER — APPOINTMENT (OUTPATIENT)
Dept: LAB | Facility: HOSPITAL | Age: 68
End: 2018-05-10
Attending: PHYSICIAN ASSISTANT
Payer: MEDICARE

## 2018-05-10 ENCOUNTER — OFFICE VISIT (OUTPATIENT)
Dept: INTERNAL MEDICINE CLINIC | Facility: CLINIC | Age: 68
End: 2018-05-10

## 2018-05-10 VITALS
HEART RATE: 57 BPM | DIASTOLIC BLOOD PRESSURE: 80 MMHG | HEIGHT: 69.75 IN | SYSTOLIC BLOOD PRESSURE: 132 MMHG | WEIGHT: 237 LBS | BODY MASS INDEX: 34.31 KG/M2

## 2018-05-10 DIAGNOSIS — R25.2 MUSCLE CRAMPING: ICD-10-CM

## 2018-05-10 DIAGNOSIS — R25.2 MUSCLE CRAMPING: Primary | ICD-10-CM

## 2018-05-10 PROCEDURE — 83735 ASSAY OF MAGNESIUM: CPT

## 2018-05-10 PROCEDURE — 80053 COMPREHEN METABOLIC PANEL: CPT

## 2018-05-10 PROCEDURE — 36415 COLL VENOUS BLD VENIPUNCTURE: CPT

## 2018-05-10 PROCEDURE — 99212 OFFICE O/P EST SF 10 MIN: CPT | Performed by: PHYSICIAN ASSISTANT

## 2018-05-10 PROCEDURE — 82550 ASSAY OF CK (CPK): CPT

## 2018-05-10 NOTE — PROGRESS NOTES
HPI:    Patient ID: Aria Nicolas is a 77year old male. HPI   Patient with history of hyperlipidemia, tobacco use, and arthritis presents with diffuse muscle cramping since last October.  States that around that time the only change he made was in his disturbance. The patient is not nervous/anxious.                Current Outpatient Prescriptions:  ATORVASTATIN 20 MG Oral Tab TAKE 1 TABLET(20 MG) BY MOUTH EVERY DAY Disp: 90 tablet Rfl: 0   FENOFIBRATE 160 MG Oral Tab TAKE 1 TABLET BY MOUTH EVERY EVENING pain and cramping in the hands, legs, and flank. May be secondary to changes in diet. Physical exam reveals tenderness to palpation of the right wrist but otherwise no swelling, erythema, warmth or other abnormality.   Will check labs as ordered and conta

## 2018-05-11 DIAGNOSIS — M25.531 PAIN IN BOTH WRISTS: ICD-10-CM

## 2018-05-11 DIAGNOSIS — R25.2 CRAMPING OF HANDS: Primary | ICD-10-CM

## 2018-05-11 DIAGNOSIS — M25.532 PAIN IN BOTH WRISTS: ICD-10-CM

## 2018-07-29 DIAGNOSIS — E78.2 MIXED HYPERLIPIDEMIA: ICD-10-CM

## 2018-07-30 RX ORDER — ATORVASTATIN CALCIUM 20 MG/1
TABLET, FILM COATED ORAL
Qty: 90 TABLET | Refills: 0 | Status: SHIPPED | OUTPATIENT
Start: 2018-07-30 | End: 2018-10-25

## 2018-07-30 NOTE — TELEPHONE ENCOUNTER
CBLM to schedule procedure. Please transfer to [facility] at ext [phone] or [phone] for scheduling. Or [facility] if unavailable.

## 2018-07-30 NOTE — TELEPHONE ENCOUNTER
Cholesterol Medications  Protocol Criteria:  · Appointment scheduled in the past 12 months or in the next 3 months  · ALT & LDL on file in the past 12 months  · ALT result < 80  · LDL result <130   Recent Outpatient Visits            2 months ago Muscle cr

## 2018-08-07 ENCOUNTER — OFFICE VISIT (OUTPATIENT)
Dept: NEUROLOGY | Facility: CLINIC | Age: 68
End: 2018-08-07
Payer: MEDICARE

## 2018-08-07 VITALS
SYSTOLIC BLOOD PRESSURE: 128 MMHG | HEART RATE: 60 BPM | HEIGHT: 70 IN | BODY MASS INDEX: 34.36 KG/M2 | WEIGHT: 240 LBS | DIASTOLIC BLOOD PRESSURE: 76 MMHG

## 2018-08-07 DIAGNOSIS — R25.2 MUSCLE CRAMPS: Primary | ICD-10-CM

## 2018-08-07 PROCEDURE — 99204 OFFICE O/P NEW MOD 45 MIN: CPT | Performed by: PHYSICAL MEDICINE & REHABILITATION

## 2018-08-07 NOTE — H&P
[facility], [place] [address], [address], [facility]    History and Physical    Luo Amend Patient Status:  No patient class for patient encounter    3/29/1950 MRN [id number]   Location [facility] [address], [place] [address], COLONOSCOPY;  Surgeon:                Thania Spring MD;  Location: [facility]                ENDOSCOPY  No date: EGD  12/05/2013: ELECTROCARDIOGRAM, COMPLETE      Comment: scanned to media tab  2010: OTHER SURGICAL HISTORY      Comment: Per NG: UP3  2010: pulse 60, height 70\", weight 240 lb. Nursing note and vitals reviewed. Constitutional: He appears well-developed and well-nourished. HENT:   Head: Normocephalic and atraumatic.    Eyes: Conjunctivae and EOM are normal.   Pulmonary/Chest: Effort norm for further follow up. Follow up as needed.     Funmilayo Matias, DO  8/7/2018

## 2018-08-07 NOTE — PATIENT INSTRUCTIONS
1) If the symptoms come back start taking B12, magnesium supplements, and CoQ10    2) Discuss with Dr. Idalia Al if the medication for high cholesterol could be a problem if the spasms come back.      3) If the spasms continue then let me know and I will orde

## 2018-08-29 ENCOUNTER — TELEPHONE (OUTPATIENT)
Dept: GASTROENTEROLOGY | Facility: CLINIC | Age: 68
End: 2018-08-29

## 2018-08-29 NOTE — TELEPHONE ENCOUNTER
Pt stated he had EGF and 4 weeks he has tender limp on stomach just above navel and wondering if hiatial hernia. No nausea, vomiting, diarrhea, stomach pain. Denies any pain.

## 2018-08-29 NOTE — TELEPHONE ENCOUNTER
[facility]- fyi  Date Time Provider Prabha Gonzalez          9/10/2018 2:30 PM BUTCH [place] [facility] - JOSELUIS LEWIS

## 2018-08-29 NOTE — TELEPHONE ENCOUNTER
Nursing: I am not familiar with this patient. It appears he had an EGD/colonoscopy with Dr. Ashly Diego in April 2018 as detailed in the endoscopic report. The patient does have a small hiatal hernia however this is not typically palpable.   There is no

## 2018-08-29 NOTE — TELEPHONE ENCOUNTER
Wife states pt has fatty tissue on stomach. Wife states pt is experiencing pain as well.  Please call pt at [phone]

## 2018-09-03 NOTE — PROGRESS NOTES
[facility] Follow-up Visit    Isabel abnormal CT of the esophagus (thickening), chronic GERD with history of erosive esophagitis, findings: Sigmoid colon diverticulosis, small hiatal hernia, gastric erosions were H. pylori negative and likely ASA/aspirin induced, colonoscopy recall based on n Tobacco Use      Smoking status: Former Smoker        Packs/day: 1.00        Years: 20.00        Pack years: 20        Types: Cigars        Quit date: 7/3/2017        Years since quittin.1      Smokeless tobacco: Never Used    Alcohol use:  Yes      Alc and rhythm, the extremities are warm and well perfused   Lung: effort normal and breath sounds normal, no respiratory distress, wheezes or rales  GI: + Small easily reducible, nontender bulge just above the umbilicus without discoloration, soft, non-tender issues. 2. GERD/hiatal hernia/history PUD: Well managed on omeprazole without significant breakthrough or red flag symptoms. Continue present management.     3.  History of adenomatous colon polyps: Patient is up-to-date on colonoscopy as detailed above

## 2018-09-07 ENCOUNTER — OFFICE VISIT (OUTPATIENT)
Dept: SURGERY | Facility: CLINIC | Age: 68
End: 2018-09-07
Payer: MEDICARE

## 2018-09-07 VITALS
WEIGHT: 247 LBS | HEIGHT: 70 IN | HEART RATE: 71 BPM | TEMPERATURE: 98 F | SYSTOLIC BLOOD PRESSURE: 132 MMHG | BODY MASS INDEX: 35.36 KG/M2 | DIASTOLIC BLOOD PRESSURE: 82 MMHG

## 2018-09-07 DIAGNOSIS — N40.1 BENIGN PROSTATIC HYPERPLASIA WITH NOCTURIA: ICD-10-CM

## 2018-09-07 DIAGNOSIS — R35.1 BENIGN PROSTATIC HYPERPLASIA WITH NOCTURIA: ICD-10-CM

## 2018-09-07 DIAGNOSIS — N52.01 ERECTILE DYSFUNCTION DUE TO ARTERIAL INSUFFICIENCY: ICD-10-CM

## 2018-09-07 DIAGNOSIS — R35.1 NOCTURIA: ICD-10-CM

## 2018-09-07 DIAGNOSIS — N40.2 PROSTATE NODULE: Primary | ICD-10-CM

## 2018-09-07 PROCEDURE — G0463 HOSPITAL OUTPT CLINIC VISIT: HCPCS | Performed by: UROLOGY

## 2018-09-07 PROCEDURE — 99215 OFFICE O/P EST HI 40 MIN: CPT | Performed by: UROLOGY

## 2018-09-07 RX ORDER — TADALAFIL 20 MG/1
TABLET ORAL
Qty: 5 TABLET | Refills: 11 | Status: ON HOLD | OUTPATIENT
Start: 2018-09-07 | End: 2019-01-15

## 2018-09-07 NOTE — PATIENT INSTRUCTIONS
1.  Please stop Viagra/ sildenafil because it has not helped your erectile dysfunction    2. Start Cialis 20 mg tablet, 1--8 hours before planned sexual activity    3.    Visit in 6 months;   Blood draw for PSA and urinalysis urine test 1--10 days    B E F

## 2018-09-07 NOTE — PROGRESS NOTES
Heather Mckeon is a 77year old male. HPI:   Patient presents with:  Consult: Patient is a 77year old male here for continuation of care. Mr. Elmira Gamboa is a former patient of [facility] [place], [place].    Urinary Symptoms (urologic): Denies any irritative or obstructive voiding i g 12 biopsies benign nodule finally October 2015 PSA had all time high of 8 and this was the fourth and last biopsy mildy enlarged prostate 36.2 g; hx of ED with risk factors of hyperlipidemia; Viagra 200 mg; rarely if ever using medication;    Smoking His Comment: occasionally: Elena       Medications (Active prior to today's visit):    Current Outpatient Prescriptions: Tadalafil (CIALIS) 20 MG Oral Tab Please take 1-2 hours before planned sexual activity; do not take with alcohol.  Disp: 5 tablet Rfl: 11 affect  Exam appropriate for age;   Head/Face: normocephalic  Genitourinary:   Perineum unremarkable. Normal anus, normal rectal tone, no rectal masses. Seminal vesicles are nonpalpable.     STOOL IMPACTION none  PROSTATE 2+ enlarged with a nodule the siz side-effects than viagra) vs penile self-injection vs vacuum erection device vs re-evaluation and I answered patient's questions on treatment; patient understands all of this and decides to order Cialis, and recommend he take the medication 6-8 hours befor vegetables, fruits and nuts; olive oil better than butter which is likely better than margarine. Fish oil supplement recommended. Marjan Conway  is a very pleasant patient and I thoroughly enjoyed evaluating him  in consultation.   I thank you for se

## 2018-09-10 ENCOUNTER — OFFICE VISIT (OUTPATIENT)
Dept: GASTROENTEROLOGY | Facility: CLINIC | Age: 68
End: 2018-09-10
Payer: MEDICARE

## 2018-09-10 VITALS
DIASTOLIC BLOOD PRESSURE: 90 MMHG | BODY MASS INDEX: 36.08 KG/M2 | HEART RATE: 62 BPM | HEIGHT: 70 IN | WEIGHT: 252 LBS | SYSTOLIC BLOOD PRESSURE: 140 MMHG

## 2018-09-10 DIAGNOSIS — R19.00 ABDOMINAL WALL BULGE: Primary | ICD-10-CM

## 2018-09-10 PROCEDURE — 99213 OFFICE O/P EST LOW 20 MIN: CPT | Performed by: NURSE PRACTITIONER

## 2018-09-10 PROCEDURE — G0463 HOSPITAL OUTPT CLINIC VISIT: HCPCS | Performed by: NURSE PRACTITIONER

## 2018-10-05 DIAGNOSIS — E78.2 MIXED HYPERLIPIDEMIA: ICD-10-CM

## 2018-10-07 RX ORDER — FENOFIBRATE 160 MG/1
TABLET ORAL
Qty: 30 TABLET | Refills: 0 | Status: SHIPPED | OUTPATIENT
Start: 2018-10-07 | End: 2018-10-08

## 2018-10-08 DIAGNOSIS — E78.2 MIXED HYPERLIPIDEMIA: ICD-10-CM

## 2018-10-10 RX ORDER — FENOFIBRATE 160 MG/1
TABLET ORAL
Qty: 90 TABLET | Refills: 0 | Status: SHIPPED | OUTPATIENT
Start: 2018-10-10 | End: 2019-12-09

## 2018-10-15 DIAGNOSIS — E78.2 MIXED HYPERLIPIDEMIA: ICD-10-CM

## 2018-10-15 RX ORDER — FENOFIBRATE 160 MG/1
TABLET ORAL
Qty: 30 TABLET | Refills: 0 | OUTPATIENT
Start: 2018-10-15

## 2018-10-15 RX ORDER — FENOFIBRATE 160 MG/1
TABLET ORAL
Qty: 90 TABLET | Refills: 0 | OUTPATIENT
Start: 2018-10-15

## 2018-10-25 DIAGNOSIS — E78.2 MIXED HYPERLIPIDEMIA: ICD-10-CM

## 2018-10-25 DIAGNOSIS — R73.09 ELEVATED GLUCOSE: Primary | ICD-10-CM

## 2018-10-27 RX ORDER — ATORVASTATIN CALCIUM 20 MG/1
TABLET, FILM COATED ORAL
Qty: 90 TABLET | Refills: 0 | Status: SHIPPED | OUTPATIENT
Start: 2018-10-27 | End: 2019-01-19

## 2018-12-04 ENCOUNTER — NURSE ONLY (OUTPATIENT)
Dept: FAMILY MEDICINE CLINIC | Facility: CLINIC | Age: 68
End: 2018-12-04
Payer: MEDICARE

## 2018-12-04 VITALS
HEART RATE: 72 BPM | TEMPERATURE: 98 F | HEIGHT: 70 IN | WEIGHT: 240 LBS | SYSTOLIC BLOOD PRESSURE: 128 MMHG | OXYGEN SATURATION: 98 % | RESPIRATION RATE: 14 BRPM | BODY MASS INDEX: 34.36 KG/M2 | DIASTOLIC BLOOD PRESSURE: 88 MMHG

## 2018-12-04 DIAGNOSIS — J06.9 UPPER RESPIRATORY TRACT INFECTION, UNSPECIFIED TYPE: Primary | ICD-10-CM

## 2018-12-04 PROCEDURE — 99213 OFFICE O/P EST LOW 20 MIN: CPT | Performed by: NURSE PRACTITIONER

## 2018-12-04 RX ORDER — AZITHROMYCIN 250 MG/1
TABLET, FILM COATED ORAL
Qty: 6 TABLET | Refills: 0 | Status: SHIPPED | OUTPATIENT
Start: 2018-12-04 | End: 2018-12-12 | Stop reason: ALTCHOICE

## 2018-12-04 NOTE — PROGRESS NOTES
CHIEF COMPLAINT:   Patient presents with:  URI      HPI:   Edgar Lao is a 77year old male with nasal allergies presents for upper respiratory symptoms for  4 days. Patient reports ear pressure, congestion, and mild cough.  Location ismid face, and upp scanned to media tab   • ESOPHAGOGASTRODUODENOSCOPY (EGD) N/A 4/11/2018    Performed by Shashi Goyal MD at [address] [place] [address] ENDOSCOPY   • OTHER SURGICAL HISTORY  2010    Per NG: UP3   • OTHER SURGICAL HISTORY  2010    Per NG: T &A   • OTHER SURGICAL HISTORY /88   Pulse 72   Temp 98 °F (36.7 °C)   Resp 14   Ht 70\"   Wt 240 lb   SpO2 98%   BMI 34.44 kg/m²   GENERAL: well developed, well nourished, in no apparent distress  SKIN: no rashes, no suspicious lesions  HEAD: atraumatic, normocephalic, no tendern Discussed with patient that infection is likely viral but due to progressive symptoms, history, and physical exam findings, patient was prescribed antibiotics.     However, patient was instructed to refrain from taking antibiotic for 24-48 hours, and to onl · Fluid draining from the nose down the throat (postnasal drip)  · Headache  · Cough  · Pain  · Fever  Diagnosing ABRS  ABRS may be diagnosed if you’ve had an upper respiratory infection like a cold and cough for 10 or more days without improvement or with © 6433-4900 [address]. [address] [facility] [place] [address] [place]. All rights reserved. This information is not intended as a substitute for professional medical care. Always follow your healthcare professional's instructions.         Acute B · Take medicine as directed. You may be told to take ibuprofen or other over-the-counter medicines. These help relieve inflammation in your bronchial tubes. Your healthcare provider may prescribe an inhaler to help open up the bronchial tubes.  Most of the

## 2018-12-04 NOTE — PATIENT INSTRUCTIONS
Acute Bacterial Rhinosinusitis (ABRS)    Acute bacterial rhinosinusitis (ABRS) is an infection of your nasal cavity and sinuses. It’s caused by bacteria. Acute means that you’ve had symptoms for less than 4 weeks, but possibly up to 12 weeks.   Understand · Nasal corticosteroid medicine. Drops or spray used in the nose can lessen swelling and congestion. · Over-the-counter pain medicine. This is to lessen sinus pain and pressure. · Nasal decongestant medicine. Spray or drops may help to lessen congestion. Your healthcare provider has told you that you have acute bronchitis. Bronchitis is infection or inflammation of the bronchial tubes (airways in the lungs). Normally, air moves easily in and out of the airways.  Bronchitis narrows the airways, making it radha · Drink plenty of fluids, such as water, juice, or warm soup. Fluids loosen mucus so that you can cough it up. This helps you breathe more easily. Fluids also prevent dehydration. · Make sure you get plenty of rest.  · Do not smoke.  Do not allow anyone el

## 2018-12-12 ENCOUNTER — MED REC SCAN ONLY (OUTPATIENT)
Dept: INTERNAL MEDICINE CLINIC | Facility: CLINIC | Age: 68
End: 2018-12-12

## 2018-12-12 ENCOUNTER — OFFICE VISIT (OUTPATIENT)
Dept: INTERNAL MEDICINE CLINIC | Facility: CLINIC | Age: 68
End: 2018-12-12
Payer: MEDICARE

## 2018-12-12 VITALS
DIASTOLIC BLOOD PRESSURE: 89 MMHG | HEIGHT: 70 IN | RESPIRATION RATE: 16 BRPM | WEIGHT: 246 LBS | HEART RATE: 59 BPM | BODY MASS INDEX: 35.22 KG/M2 | SYSTOLIC BLOOD PRESSURE: 147 MMHG | TEMPERATURE: 98 F

## 2018-12-12 DIAGNOSIS — Z11.59 NEED FOR HEPATITIS C SCREENING TEST: ICD-10-CM

## 2018-12-12 DIAGNOSIS — K43.9 VENTRAL HERNIA WITHOUT OBSTRUCTION OR GANGRENE: ICD-10-CM

## 2018-12-12 DIAGNOSIS — Z00.00 MEDICARE ANNUAL WELLNESS VISIT, SUBSEQUENT: Primary | ICD-10-CM

## 2018-12-12 DIAGNOSIS — Z23 NEED FOR VACCINATION: ICD-10-CM

## 2018-12-12 DIAGNOSIS — K76.0 FATTY LIVER: ICD-10-CM

## 2018-12-12 DIAGNOSIS — E78.2 MIXED HYPERLIPIDEMIA: ICD-10-CM

## 2018-12-12 PROBLEM — R97.20 ELEVATED PSA: Status: RESOLVED | Noted: 2018-03-05 | Resolved: 2018-12-12

## 2018-12-12 PROBLEM — K22.89 ESOPHAGEAL THICKENING: Status: RESOLVED | Noted: 2018-02-28 | Resolved: 2018-12-12

## 2018-12-12 PROCEDURE — G0463 HOSPITAL OUTPT CLINIC VISIT: HCPCS | Performed by: INTERNAL MEDICINE

## 2018-12-12 PROCEDURE — G0439 PPPS, SUBSEQ VISIT: HCPCS | Performed by: INTERNAL MEDICINE

## 2018-12-12 PROCEDURE — 90653 IIV ADJUVANT VACCINE IM: CPT | Performed by: INTERNAL MEDICINE

## 2018-12-12 PROCEDURE — G0008 ADMIN INFLUENZA VIRUS VAC: HCPCS | Performed by: INTERNAL MEDICINE

## 2018-12-12 PROCEDURE — 99212 OFFICE O/P EST SF 10 MIN: CPT | Performed by: INTERNAL MEDICINE

## 2018-12-12 NOTE — PATIENT INSTRUCTIONS
Samuel Rosas's SCREENING SCHEDULE   Tests on this list are recommended by your physician but may not be covered, or covered at this frequency, by your insurer. Please check with your insurance carrier before scheduling to verify coverage.     PREVENTATI • Anyone with a family history    Colorectal Cancer Screening Covered up to Age 76     Colonoscopy Screen   Covered every 10 years- more often if abnormal Colonoscopy due on 04/11/2028 Update [facility] if applicable    Flex Sigmoidoscopy Screen with a cut with metal- TD and TDaP Not covered by Medicare Part B) No orders found for this or any previous visit.  This may be covered with your prescription benefits, but Medicare does not cover unless Medically needed    Zoster (Not covered by Medicare P

## 2018-12-12 NOTE — ASSESSMENT & PLAN NOTE
Advised to call or go to ER if hernia becomes incarcerated. Explained symptoms of incarceration. Otherwise will observe for now since pt does not have pain and he is losing weight.

## 2018-12-12 NOTE — ASSESSMENT & PLAN NOTE
Counseled regarding the importance of weight loss for overall health and to reduce progression of fatty liver to cirrhosis. He is on a weight reducing diet and has already lost weight.

## 2018-12-12 NOTE — PROGRESS NOTES
HPI:   Brynn Kennedy is a 77year old male who presents for a Medicare Subsequent Annual Wellness visit (Pt already had Initial Annual Wellness). Pt was having muscle cramps all over his body. Then they went away. Then he had a lump on his abdomen. CAGE Alcohol screening   Aisha Llanos was screened for Alcohol abuse and had a score of 0 so is at low risk.      Patient Care Team: Patient Care Team:  Trang Miguel MD as PCP - General (Internal Medicine)  Trang Miguel MD as PCP - Rashad Garcia Omeprazole 40 MG Oral Capsule Delayed Release Take 1 capsule by mouth twice daily before breakfast and at supper      MEDICAL INFORMATION:   He  has a past medical history of Allergic rhinitis, Esophageal reflux, Fx wrist, Hemorrhoids, High cholesterol, Ot Neurological: Negative for headaches. Psychiatric/Behavioral: Negative for depressed mood. The patient is not nervous/anxious. EXAM:   /89 (BP Location: Right arm, Patient Position: Sitting, Cuff Size: large)   Pulse 59   Temp 97.8 °F (36. Constitutional: He is oriented to person, place, and time. He appears well-developed and well-nourished. HENT:   Head: Normocephalic and atraumatic.    Right Ear: Tympanic membrane and ear canal normal.   Left Ear: Tympanic membrane and ear canal normal. Medicare annual wellness visit, subsequent - Primary     Unremarkable exam.  Labs were reviewed  Pt is up-to-date on colonoscopy. Immunizations were reviewed. Fall risk assessment was negative  Hearing assessment was abnormal.  Pt wears hearing aids. Date Value   02/17/2016 5.7       No flowsheet data found.     Fasting Blood Sugar (FSB)Annually Glucose (mg/dL)   Date Value   05/10/2018 100 (H)     GLUCOSE (P) (mg/dL)   Date Value   09/02/2016 111 (H)          Cardiovascular Disease Screening     LDL An Zoster   Not covered by Medicare Part B No vaccine history found This may be covered with your pharmacy  prescription benefits

## 2018-12-12 NOTE — ASSESSMENT & PLAN NOTE
Unremarkable exam.  Labs were reviewed  Pt is up-to-date on colonoscopy. Immunizations were reviewed. Fall risk assessment was negative  Hearing assessment was abnormal.  Pt wears hearing aids.

## 2018-12-13 ENCOUNTER — APPOINTMENT (OUTPATIENT)
Dept: LAB | Facility: HOSPITAL | Age: 68
End: 2018-12-13
Attending: INTERNAL MEDICINE
Payer: MEDICARE

## 2018-12-13 DIAGNOSIS — Z11.59 NEED FOR HEPATITIS C SCREENING TEST: ICD-10-CM

## 2018-12-13 PROCEDURE — 86803 HEPATITIS C AB TEST: CPT

## 2018-12-13 PROCEDURE — 36415 COLL VENOUS BLD VENIPUNCTURE: CPT

## 2018-12-20 ENCOUNTER — TELEPHONE (OUTPATIENT)
Dept: OTHER | Age: 68
End: 2018-12-20

## 2018-12-20 NOTE — TELEPHONE ENCOUNTER
Patient calling and states that he received a letter stating that he is due to the blood tests , states that he did blood test on 12/13/18 and they should done everything, placed patient on hold while calling hospital lab.  Spoke with Drew Matias, , stat

## 2018-12-21 ENCOUNTER — PRIOR ORIGINAL RECORDS (OUTPATIENT)
Dept: OTHER | Age: 68
End: 2018-12-21

## 2018-12-21 ENCOUNTER — LAB ENCOUNTER (OUTPATIENT)
Dept: LAB | Age: 68
End: 2018-12-21
Attending: INTERNAL MEDICINE
Payer: MEDICARE

## 2018-12-21 DIAGNOSIS — R73.09 ELEVATED GLUCOSE: ICD-10-CM

## 2018-12-21 DIAGNOSIS — E78.2 MIXED HYPERLIPIDEMIA: ICD-10-CM

## 2018-12-21 PROCEDURE — 83036 HEMOGLOBIN GLYCOSYLATED A1C: CPT

## 2018-12-21 PROCEDURE — 36415 COLL VENOUS BLD VENIPUNCTURE: CPT

## 2018-12-21 PROCEDURE — 85025 COMPLETE CBC W/AUTO DIFF WBC: CPT

## 2018-12-21 PROCEDURE — 80053 COMPREHEN METABOLIC PANEL: CPT

## 2018-12-21 PROCEDURE — 80061 LIPID PANEL: CPT

## 2018-12-21 PROCEDURE — 84443 ASSAY THYROID STIM HORMONE: CPT

## 2019-01-01 ENCOUNTER — EXTERNAL RECORD (OUTPATIENT)
Dept: OTHER | Age: 69
End: 2019-01-01

## 2019-01-06 DIAGNOSIS — E78.2 MIXED HYPERLIPIDEMIA: ICD-10-CM

## 2019-01-07 RX ORDER — FENOFIBRATE 160 MG/1
TABLET ORAL
Qty: 90 TABLET | Refills: 0 | Status: ON HOLD | OUTPATIENT
Start: 2019-01-07 | End: 2019-01-15

## 2019-01-14 ENCOUNTER — APPOINTMENT (OUTPATIENT)
Dept: CT IMAGING | Facility: HOSPITAL | Age: 69
End: 2019-01-14
Attending: EMERGENCY MEDICINE
Payer: MEDICARE

## 2019-01-14 ENCOUNTER — HOSPITAL ENCOUNTER (OUTPATIENT)
Facility: HOSPITAL | Age: 69
Setting detail: OBSERVATION
Discharge: HOME | End: 2019-01-15
Attending: EMERGENCY MEDICINE | Admitting: HOSPITALIST
Payer: MEDICARE

## 2019-01-14 ENCOUNTER — APPOINTMENT (OUTPATIENT)
Dept: GENERAL RADIOLOGY | Facility: HOSPITAL | Age: 69
End: 2019-01-14
Attending: EMERGENCY MEDICINE
Payer: MEDICARE

## 2019-01-14 DIAGNOSIS — R07.9 CHEST PAIN WITH HIGH RISK FOR CARDIAC ETIOLOGY: Primary | ICD-10-CM

## 2019-01-14 LAB
ANION GAP SERPL CALC-SCNC: 11 MMOL/L (ref 0–18)
BASOPHILS # BLD: 0 K/UL (ref 0–0.2)
BASOPHILS NFR BLD: 0 %
BUN SERPL-MCNC: 19 MG/DL (ref 8–20)
BUN/CREAT SERPL: 12.7 (ref 10–20)
CALCIUM SERPL-MCNC: 9.6 MG/DL (ref 8.5–10.5)
CHLORIDE SERPL-SCNC: 105 MMOL/L (ref 95–110)
CO2 SERPL-SCNC: 24 MMOL/L (ref 22–32)
CREAT SERPL-MCNC: 1.5 MG/DL (ref 0.5–1.5)
D DIMER PPP FEU-MCNC: <0.27 MCG/ML (ref ?–0.7)
EOSINOPHIL # BLD: 0.2 K/UL (ref 0–0.7)
EOSINOPHIL NFR BLD: 2 %
ERYTHROCYTE [DISTWIDTH] IN BLOOD BY AUTOMATED COUNT: 14.4 % (ref 11–15)
GLUCOSE SERPL-MCNC: 141 MG/DL (ref 70–99)
HCT VFR BLD AUTO: 44.4 % (ref 41–52)
HGB BLD-MCNC: 14.9 G/DL (ref 13.5–17.5)
LYMPHOCYTES # BLD: 2.5 K/UL (ref 1–4)
LYMPHOCYTES NFR BLD: 19 %
MCH RBC QN AUTO: 30.2 PG (ref 27–32)
MCHC RBC AUTO-ENTMCNC: 33.5 G/DL (ref 32–37)
MCV RBC AUTO: 90.2 FL (ref 80–100)
MONOCYTES # BLD: 1.1 K/UL (ref 0–1)
MONOCYTES NFR BLD: 8 %
NEUTROPHILS # BLD AUTO: 9.2 K/UL (ref 1.8–7.7)
NEUTROPHILS NFR BLD: 71 %
OSMOLALITY UR CALC.SUM OF ELEC: 295 MOSM/KG (ref 275–295)
PLATELET # BLD AUTO: 196 K/UL (ref 140–400)
PMV BLD AUTO: 8.9 FL (ref 7.4–10.3)
POTASSIUM SERPL-SCNC: 4.2 MMOL/L (ref 3.3–5.1)
RBC # BLD AUTO: 4.93 M/UL (ref 4.5–5.9)
SODIUM SERPL-SCNC: 140 MMOL/L (ref 136–144)
TROPONIN I SERPL-MCNC: 0 NG/ML (ref ?–0.03)
TROPONIN I SERPL-MCNC: 0.01 NG/ML (ref ?–0.03)
WBC # BLD AUTO: 12.9 K/UL (ref 4–11)

## 2019-01-14 PROCEDURE — 71045 X-RAY EXAM CHEST 1 VIEW: CPT | Performed by: EMERGENCY MEDICINE

## 2019-01-14 PROCEDURE — 99219 INITIAL OBSERVATION CARE,LEVL II: CPT | Performed by: HOSPITALIST

## 2019-01-14 PROCEDURE — 74175 CTA ABDOMEN W/CONTRAST: CPT | Performed by: EMERGENCY MEDICINE

## 2019-01-14 PROCEDURE — 71275 CT ANGIOGRAPHY CHEST: CPT | Performed by: EMERGENCY MEDICINE

## 2019-01-14 RX ORDER — METOPROLOL TARTRATE 5 MG/5ML
5 INJECTION INTRAVENOUS ONCE
Status: COMPLETED | OUTPATIENT
Start: 2019-01-14 | End: 2019-01-14

## 2019-01-14 RX ORDER — NITROGLYCERIN 0.4 MG/1
0.4 TABLET SUBLINGUAL ONCE
Status: COMPLETED | OUTPATIENT
Start: 2019-01-14 | End: 2019-01-14

## 2019-01-15 ENCOUNTER — APPOINTMENT (OUTPATIENT)
Dept: CV DIAGNOSTICS | Facility: HOSPITAL | Age: 69
End: 2019-01-15
Attending: HOSPITALIST
Payer: MEDICARE

## 2019-01-15 ENCOUNTER — APPOINTMENT (OUTPATIENT)
Dept: NUCLEAR MEDICINE | Facility: HOSPITAL | Age: 69
End: 2019-01-15
Attending: HOSPITALIST
Payer: MEDICARE

## 2019-01-15 VITALS
HEART RATE: 88 BPM | WEIGHT: 240 LBS | OXYGEN SATURATION: 93 % | RESPIRATION RATE: 16 BRPM | HEIGHT: 69 IN | DIASTOLIC BLOOD PRESSURE: 94 MMHG | TEMPERATURE: 98 F | BODY MASS INDEX: 35.55 KG/M2 | SYSTOLIC BLOOD PRESSURE: 133 MMHG

## 2019-01-15 LAB — GLUCOSE BLDC GLUCOMTR-MCNC: 124 MG/DL (ref 70–99)

## 2019-01-15 PROCEDURE — 78452 HT MUSCLE IMAGE SPECT MULT: CPT | Performed by: HOSPITALIST

## 2019-01-15 PROCEDURE — 93017 CV STRESS TEST TRACING ONLY: CPT | Performed by: HOSPITALIST

## 2019-01-15 RX ORDER — PANTOPRAZOLE SODIUM 40 MG/1
40 TABLET, DELAYED RELEASE ORAL
Status: DISCONTINUED | OUTPATIENT
Start: 2019-01-15 | End: 2019-01-15

## 2019-01-15 RX ORDER — ZOLPIDEM TARTRATE 5 MG/1
5 TABLET ORAL NIGHTLY PRN
Status: DISCONTINUED | OUTPATIENT
Start: 2019-01-15 | End: 2019-01-15

## 2019-01-15 RX ORDER — HYDROCODONE BITARTRATE AND ACETAMINOPHEN 5; 325 MG/1; MG/1
1 TABLET ORAL EVERY 6 HOURS PRN
Status: DISCONTINUED | OUTPATIENT
Start: 2019-01-15 | End: 2019-01-15

## 2019-01-15 RX ORDER — MELATONIN
1000 DAILY
COMMUNITY
End: 2020-02-18 | Stop reason: ALTCHOICE

## 2019-01-15 RX ORDER — HEPARIN SODIUM 5000 [USP'U]/ML
5000 INJECTION, SOLUTION INTRAVENOUS; SUBCUTANEOUS EVERY 12 HOURS
Status: DISCONTINUED | OUTPATIENT
Start: 2019-01-15 | End: 2019-01-15

## 2019-01-15 RX ORDER — FENOFIBRATE 134 MG/1
134 CAPSULE ORAL
Status: DISCONTINUED | OUTPATIENT
Start: 2019-01-15 | End: 2019-01-15

## 2019-01-15 RX ORDER — UBIDECARENONE 75 MG
100 CAPSULE ORAL DAILY
COMMUNITY
End: 2020-02-18 | Stop reason: ALTCHOICE

## 2019-01-15 RX ORDER — ATORVASTATIN CALCIUM 20 MG/1
20 TABLET, FILM COATED ORAL DAILY
Status: DISCONTINUED | OUTPATIENT
Start: 2019-01-15 | End: 2019-01-15

## 2019-01-15 RX ORDER — 0.9 % SODIUM CHLORIDE 0.9 %
VIAL (ML) INJECTION
Status: COMPLETED
Start: 2019-01-15 | End: 2019-01-15

## 2019-01-15 RX ORDER — HYDRALAZINE HYDROCHLORIDE 20 MG/ML
10 INJECTION INTRAMUSCULAR; INTRAVENOUS EVERY 4 HOURS PRN
Status: DISCONTINUED | OUTPATIENT
Start: 2019-01-15 | End: 2019-01-15

## 2019-01-15 RX ORDER — ASPIRIN 81 MG/1
81 TABLET ORAL DAILY
Status: DISCONTINUED | OUTPATIENT
Start: 2019-01-15 | End: 2019-01-15

## 2019-01-15 RX ORDER — ONDANSETRON 2 MG/ML
4 INJECTION INTRAMUSCULAR; INTRAVENOUS EVERY 6 HOURS PRN
Status: DISCONTINUED | OUTPATIENT
Start: 2019-01-15 | End: 2019-01-15

## 2019-01-15 RX ORDER — ACETAMINOPHEN 325 MG/1
650 TABLET ORAL EVERY 6 HOURS PRN
Status: DISCONTINUED | OUTPATIENT
Start: 2019-01-15 | End: 2019-01-15

## 2019-01-15 NOTE — H&P
[address] Patient Status:  Emergency    3/29/1950 MRN [id number]   Location [address] Attending Annelise Carreon MD   Hosp Day # 0 PCP Mi Mott MD     Date:   Attack Father 50        Per NG: massive heart attack ( cause of death)   • Cancer Mother 67        Per NG: leukemia ( cause of death)   • Other (Other) Maternal Grandmother    • Other (Other) Maternal Grandfather    • Other (Other) Paternal Grandmother (36.7 °C)  Pulse:  [107] 107  Resp:  [18] 18  BP: (143-156)/(82-99) 156/82    General:  Alert and oriented. Diffuse skin problem:  None. Eye:  Pupils are equal, round and reactive to light, extraocular movements are intact, Normal conjunctiva.   HENT:  No MD    Disposition  Clinical course will dictate outcome      Jesus Bazan MD  1/14/2019  10:07 PM

## 2019-01-15 NOTE — ED INITIAL ASSESSMENT (HPI)
C/o chest pain since this afternoon which has been constant since it began, denies gabriella at this time

## 2019-01-15 NOTE — ED PROVIDER NOTES
Patient Seen in: [facility] AND [facility] Emergency Department    History   Patient presents with:  Chest Pain Angina (cardiovascular)    Stated Complaint: chest pain    HPI    77year old male presenting with chest pain. Pain began 6 hours ago .  The patient de total) by mouth daily.    Desloratadine (CLARINEX) 5 MG Oral Tab,  TAKE 1 TABLET BY MOUTH EVERY DAY   Omeprazole 40 MG Oral Capsule Delayed Release,  Take 1 capsule by mouth twice daily before breakfast and at supper       Family History   Problem Relation anicteric. Cardiovascular: rr no murmur  Respiratory: Normal breath sounds, no respiratory distress, no wheezing, no chest tenderness. GI: Bowel sounds normal, Soft, no tenderness, no masses, no pulsatile masses. : No CVA tenderness.   Skin: Warm, dry, LIGHT GREEN   RAINBOW DRAW GOLD   RAINBOW DRAW LAVENDER TALL (BNP)     EKG    Rate, intervals and axes as noted on EKG Report.   Rate: 80  Rhythm: Sinus Rhythm  Reading: lvh with strain           MDM     Monitor Interpretation:  nsr 80    Radiology Interpre diagnosis)    Disposition:  There is no disposition on file for this visit. There is no disposition time on file for this visit. Follow-up:  No follow-up provider specified.   We recommend that you schedule follow up care with a primary care provider wi

## 2019-01-15 NOTE — DISCHARGE SUMMARY
[facility]    Discharge Summary    Yasmine Davey Patient Status:  Observation    3/29/1950 MRN [id number]   Location [address] [facility] Attending Bebo Grayson MD   Hosp Day # 0 PCP Fracisco Jackson MD     Date of Admission:  nerves II-XII are grossly intact. Cognition and Speech:  Oriented, speech clear and coherent. Psychiatric:  Cooperative, appropriate mood & affect.     History of Present Illness:     77year old male, with a past medical history significant for GERD pres TABLET BY MOUTH EVERY EVENING   Quantity:  90 tablet  Refills:  0        CONTINUE taking these medications      Instructions Prescription details   aspirin 81 MG Tbec      Take 1 tablet (81 mg total) by mouth daily.    Quantity:  30 tablet  Refills:  11

## 2019-01-16 ENCOUNTER — PATIENT OUTREACH (OUTPATIENT)
Dept: CASE MANAGEMENT | Age: 69
End: 2019-01-16

## 2019-01-16 NOTE — PROGRESS NOTES
Ric Harley [phone] for post hospital follow up, [facility] contact information provided.

## 2019-01-17 NOTE — PROGRESS NOTES
Condition Update Post Discharge   Discharge Date: 1/15/19  Contact Date: 1/16/2019    Consent Verification:  Assessment Completed With: Patient  HIPAA Verified? Yes    General:   • How have you been since your discharge from the hospital/facility?  I'm f

## 2019-01-19 DIAGNOSIS — E78.2 MIXED HYPERLIPIDEMIA: ICD-10-CM

## 2019-01-19 RX ORDER — ATORVASTATIN CALCIUM 20 MG/1
TABLET, FILM COATED ORAL
Qty: 90 TABLET | Refills: 0 | Status: SHIPPED | OUTPATIENT
Start: 2019-01-19 | End: 2019-04-16

## 2019-01-20 NOTE — TELEPHONE ENCOUNTER
Cholesterol Medications  Protocol Criteria:  · Appointment scheduled in the past 12 months or in the next 3 months  · ALT & LDL on file in the past 12 months  · ALT result < 80  · LDL result <130   Recent Outpatient Visits            1 month ago Medicare a

## 2019-01-24 LAB
CHOLESTEROL, TOTAL: 149 MG/DL
HDL CHOLESTEROL: 35 MG/DL
LDL CHOLESTEROL: 85 MG/DL
NON-HDL CHOLESTEROL: 114 MG/DL
TRIGLYCERIDES: 146 MG/DL

## 2019-02-01 ENCOUNTER — PRIOR ORIGINAL RECORDS (OUTPATIENT)
Dept: OTHER | Age: 69
End: 2019-02-01

## 2019-02-01 ENCOUNTER — MYAURORA ACCOUNT LINK (OUTPATIENT)
Dept: OTHER | Age: 69
End: 2019-02-01

## 2019-02-05 ENCOUNTER — PRIOR ORIGINAL RECORDS (OUTPATIENT)
Dept: OTHER | Age: 69
End: 2019-02-05

## 2019-02-07 ENCOUNTER — APPOINTMENT (OUTPATIENT)
Dept: LAB | Facility: HOSPITAL | Age: 69
End: 2019-02-07
Attending: UROLOGY
Payer: MEDICARE

## 2019-02-07 DIAGNOSIS — N40.2 PROSTATE NODULE: ICD-10-CM

## 2019-02-07 LAB
BILIRUB UR QL: NEGATIVE
CLARITY UR: CLEAR
COLOR UR: YELLOW
GLUCOSE UR-MCNC: NEGATIVE MG/DL
HGB UR QL STRIP.AUTO: NEGATIVE
KETONES UR-MCNC: NEGATIVE MG/DL
LEUKOCYTE ESTERASE UR QL STRIP.AUTO: NEGATIVE
NITRITE UR QL STRIP.AUTO: NEGATIVE
PH UR: 5 [PH] (ref 5–8)
PROT UR-MCNC: NEGATIVE MG/DL
PSA SERPL-MCNC: 4.92 NG/ML (ref 0.01–4)
PSA SERPL-MCNC: 5.3 NG/ML (ref 0–4)
SP GR UR STRIP: 1.02 (ref 1–1.03)
UROBILINOGEN UR STRIP-ACNC: <2
VIT C UR-MCNC: NEGATIVE MG/DL

## 2019-02-07 PROCEDURE — 84153 ASSAY OF PSA TOTAL: CPT

## 2019-02-07 PROCEDURE — 36415 COLL VENOUS BLD VENIPUNCTURE: CPT

## 2019-02-07 PROCEDURE — 81003 URINALYSIS AUTO W/O SCOPE: CPT

## 2019-02-08 ENCOUNTER — TELEPHONE (OUTPATIENT)
Dept: SURGERY | Facility: CLINIC | Age: 69
End: 2019-02-08

## 2019-02-08 ENCOUNTER — OFFICE VISIT (OUTPATIENT)
Dept: SURGERY | Facility: CLINIC | Age: 69
End: 2019-02-08
Payer: MEDICARE

## 2019-02-08 VITALS
HEIGHT: 69.76 IN | DIASTOLIC BLOOD PRESSURE: 85 MMHG | TEMPERATURE: 98 F | BODY MASS INDEX: 35.18 KG/M2 | HEART RATE: 66 BPM | SYSTOLIC BLOOD PRESSURE: 144 MMHG | WEIGHT: 243 LBS

## 2019-02-08 DIAGNOSIS — N52.01 ERECTILE DYSFUNCTION DUE TO ARTERIAL INSUFFICIENCY: ICD-10-CM

## 2019-02-08 DIAGNOSIS — N40.2 PROSTATE NODULE: ICD-10-CM

## 2019-02-08 DIAGNOSIS — N40.1 BENIGN PROSTATIC HYPERPLASIA WITH NOCTURIA: ICD-10-CM

## 2019-02-08 DIAGNOSIS — R97.20 ELEVATED PSA: Primary | ICD-10-CM

## 2019-02-08 DIAGNOSIS — R35.1 BENIGN PROSTATIC HYPERPLASIA WITH NOCTURIA: ICD-10-CM

## 2019-02-08 PROCEDURE — G0463 HOSPITAL OUTPT CLINIC VISIT: HCPCS | Performed by: UROLOGY

## 2019-02-08 PROCEDURE — 99215 OFFICE O/P EST HI 40 MIN: CPT | Performed by: UROLOGY

## 2019-02-08 NOTE — PATIENT INSTRUCTIONS
Alexandr Villanueva M.D.      1.    Cialis ( or generic version tadalafil) 20 milligrams tablet, 6 - 8 hours before planned sexual activity    2.    If you decide that you would like to start penile self injection therapy with lopez

## 2019-02-08 NOTE — TELEPHONE ENCOUNTER
Staff,       ordered a MRI of the Pelvis . PT may need prior authorization. Informed patient to contact insurance to see if PA needed. If so patient to call back and notify office. PT verbalized understanding.

## 2019-02-08 NOTE — PROGRESS NOTES
HPI:    Patient ID: Moncho Weber is a 77year old male. HPI   Elevated PSA  2/7/2019 PSA = 4.92.  Patient denies associated symptoms to suggest prostate cancer such as weight loss or loss of appetite or bone pain and denies associated symptoms to sugge transrectal US and prostate biopsies: 1999, 2000, 2004; when PSA = 4.1, US showed prostate 24 g 12 biopsies benign nodule finally October 2015 PSA had all time high of 8 and this was the fourth and last biopsy mildy enlarged prostate 36.2 g; hx of ED with BY MOUTH EVERY EVENING Disp: 90 tablet Rfl: 0   aspirin 81 MG Oral Tab EC Take 1 tablet (81 mg total) by mouth daily.  Disp: 30 tablet Rfl: 11   Desloratadine (CLARINEX) 5 MG Oral Tab TAKE 1 TABLET BY MOUTH EVERY DAY Disp: 30 tablet Rfl: 0   Omeprazole 40 M Years: 20.00        Pack years: 20        Types: Cigars        Quit date: 7/3/2017        Years since quittin.6      Smokeless tobacco: Never Used    Alcohol use:  Yes      Alcohol/week: 0.0 oz      Drinks per session: 1 or 2      Comment: occasiona Nursing note and vitals reviewed. 02/08/19  1325   BP: 144/85   Pulse: 66   Temp: 97.7 °F (36.5 °C)   TempSrc: Oral   Weight: 243 lb (110.2 kg)   Height: 5' 9.76\" (1.772 m)         Body mass index is 35.11 kg/m².      LABORATORIES  2/7/19 PSA = 4.92; of 5 negative biopsies;  I fully explained to patient the benefits, risks, complications, side effects, reasons for, nature of, alternatives of observe with PSA vs MRI of prostate vs saturation biopsy vs re-evaluation and I answered patient's questions on t concerning them; patient understands all of this and decides to proceed with the following:       Treatment Plan & Patient Instructions    1. Cialis ( or generic version tadalafil) 20 milligrams tablet, 6 - 8 hours before planned sexual activity     2.

## 2019-02-13 NOTE — TELEPHONE ENCOUNTER
Pt procedure/Testing: MRI Pelvis   Pt insurance/number to contact: Medicare / [employer] Supplement   CPT: [id number]  Indication for test: Elevated PSA / Prostate Nodule   Authorization placed in referral tab: Yes   Informed pt of exp. date of approved procedure

## 2019-02-14 ENCOUNTER — PRIOR ORIGINAL RECORDS (OUTPATIENT)
Dept: OTHER | Age: 69
End: 2019-02-14

## 2019-02-14 ENCOUNTER — LAB ENCOUNTER (OUTPATIENT)
Dept: LAB | Facility: HOSPITAL | Age: 69
End: 2019-02-14
Attending: INTERNAL MEDICINE
Payer: MEDICARE

## 2019-02-14 DIAGNOSIS — Z01.810 PRE-OPERATIVE CARDIOVASCULAR EXAMINATION: ICD-10-CM

## 2019-02-14 DIAGNOSIS — R93.1 ABNORMAL SCREENING CARDIAC CT: ICD-10-CM

## 2019-02-14 DIAGNOSIS — R07.89 CHEST DISCOMFORT: ICD-10-CM

## 2019-02-14 LAB
ANION GAP SERPL CALC-SCNC: 7 MMOL/L (ref 0–18)
BASOPHILS # BLD AUTO: 0.05 X10(3) UL (ref 0–0.2)
BASOPHILS NFR BLD AUTO: 0.7 %
BUN BLD-MCNC: 11 MG/DL (ref 7–18)
BUN/CREAT SERPL: 9.4 (ref 10–20)
CALCIUM BLD-MCNC: 9.3 MG/DL (ref 8.5–10.1)
CHLORIDE SERPL-SCNC: 111 MMOL/L (ref 98–107)
CO2 SERPL-SCNC: 25 MMOL/L (ref 21–32)
CREAT BLD-MCNC: 1.17 MG/DL (ref 0.7–1.3)
DEPRECATED RDW RBC AUTO: 44.6 FL (ref 35.1–46.3)
EOSINOPHIL # BLD AUTO: 0.26 X10(3) UL (ref 0–0.7)
EOSINOPHIL NFR BLD AUTO: 3.4 %
ERYTHROCYTE [DISTWIDTH] IN BLOOD BY AUTOMATED COUNT: 13.5 % (ref 11–15)
GLUCOSE BLD-MCNC: 115 MG/DL (ref 70–99)
HCT VFR BLD AUTO: 43.6 % (ref 39–53)
HGB BLD-MCNC: 14.5 G/DL (ref 13–17.5)
IMM GRANULOCYTES # BLD AUTO: 0.06 X10(3) UL (ref 0–1)
IMM GRANULOCYTES NFR BLD: 0.8 %
LYMPHOCYTES # BLD AUTO: 3.25 X10(3) UL (ref 1–4)
LYMPHOCYTES NFR BLD AUTO: 42.3 %
MCH RBC QN AUTO: 30.1 PG (ref 26–34)
MCHC RBC AUTO-ENTMCNC: 33.3 G/DL (ref 31–37)
MCV RBC AUTO: 90.5 FL (ref 80–100)
MONOCYTES # BLD AUTO: 0.66 X10(3) UL (ref 0.1–1)
MONOCYTES NFR BLD AUTO: 8.6 %
NEUTROPHILS # BLD AUTO: 3.4 X10 (3) UL (ref 1.5–7.7)
NEUTROPHILS # BLD AUTO: 3.4 X10(3) UL (ref 1.5–7.7)
NEUTROPHILS NFR BLD AUTO: 44.2 %
OSMOLALITY SERPL CALC.SUM OF ELEC: 296 MOSM/KG (ref 275–295)
PLATELET # BLD AUTO: 188 10(3)UL (ref 150–450)
POTASSIUM SERPL-SCNC: 3.9 MMOL/L (ref 3.5–5.1)
RBC # BLD AUTO: 4.82 X10(6)UL (ref 3.8–5.8)
SODIUM SERPL-SCNC: 143 MMOL/L (ref 136–145)
WBC # BLD AUTO: 7.7 X10(3) UL (ref 4–11)

## 2019-02-14 PROCEDURE — 80048 BASIC METABOLIC PNL TOTAL CA: CPT

## 2019-02-14 PROCEDURE — 36415 COLL VENOUS BLD VENIPUNCTURE: CPT

## 2019-02-14 PROCEDURE — 85025 COMPLETE CBC W/AUTO DIFF WBC: CPT

## 2019-02-18 LAB
BUN: 11 MG/DL
CALCIUM: 9.3 MG/DL
CHLORIDE: 111 MEQ/L
CREATININE, SERUM: 1.17 MG/DL
GLUCOSE: 115 MG/DL
HEMATOCRIT: 43.6 %
HEMOGLOBIN: 14.5 G/DL
PLATELETS: 188 K/UL
POTASSIUM, SERUM: 3.9 MEQ/L
RED BLOOD COUNT: 4.82 X 10-6/U
SODIUM: 143 MEQ/L
WHITE BLOOD COUNT: 7.7 X 10-3/U

## 2019-02-19 ENCOUNTER — HOSPITAL ENCOUNTER (OUTPATIENT)
Dept: INTERVENTIONAL RADIOLOGY/VASCULAR | Facility: HOSPITAL | Age: 69
Discharge: HOME | End: 2019-02-20
Attending: INTERNAL MEDICINE | Admitting: INTERNAL MEDICINE
Payer: MEDICARE

## 2019-02-19 DIAGNOSIS — R07.89 CHEST DISCOMFORT: ICD-10-CM

## 2019-02-19 DIAGNOSIS — R93.1 ABNORMAL SCREENING CT OF HEART: ICD-10-CM

## 2019-02-19 PROCEDURE — 99152 MOD SED SAME PHYS/QHP 5/>YRS: CPT

## 2019-02-19 PROCEDURE — 92978 ENDOLUMINL IVUS OCT C 1ST: CPT

## 2019-02-19 PROCEDURE — 93571 IV DOP VEL&/PRESS C FLO 1ST: CPT

## 2019-02-19 PROCEDURE — B2111ZZ FLUOROSCOPY OF MULTIPLE CORONARY ARTERIES USING LOW OSMOLAR CONTRAST: ICD-10-PCS | Performed by: INTERNAL MEDICINE

## 2019-02-19 PROCEDURE — 99153 MOD SED SAME PHYS/QHP EA: CPT

## 2019-02-19 PROCEDURE — 93454 CORONARY ARTERY ANGIO S&I: CPT

## 2019-02-19 PROCEDURE — 92928 PRQ TCAT PLMT NTRAC ST 1 LES: CPT

## 2019-02-19 PROCEDURE — 85347 COAGULATION TIME ACTIVATED: CPT

## 2019-02-19 PROCEDURE — 93458 L HRT ARTERY/VENTRICLE ANGIO: CPT

## 2019-02-19 PROCEDURE — 027034Z DILATION OF CORONARY ARTERY, ONE ARTERY WITH DRUG-ELUTING INTRALUMINAL DEVICE, PERCUTANEOUS APPROACH: ICD-10-PCS | Performed by: INTERNAL MEDICINE

## 2019-02-19 PROCEDURE — B240ZZ3 ULTRASONOGRAPHY OF SINGLE CORONARY ARTERY, INTRAVASCULAR: ICD-10-PCS | Performed by: INTERNAL MEDICINE

## 2019-02-19 RX ORDER — ACETAMINOPHEN 325 MG/1
650 TABLET ORAL EVERY 4 HOURS PRN
Status: DISCONTINUED | OUTPATIENT
Start: 2019-02-19 | End: 2019-02-20

## 2019-02-19 RX ORDER — SODIUM CHLORIDE 9 MG/ML
INJECTION, SOLUTION INTRAVENOUS CONTINUOUS
Status: DISCONTINUED | OUTPATIENT
Start: 2019-02-19 | End: 2019-02-20

## 2019-02-19 RX ORDER — SODIUM CHLORIDE 9 MG/ML
INJECTION, SOLUTION INTRAVENOUS CONTINUOUS
Status: DISPENSED | OUTPATIENT
Start: 2019-02-19 | End: 2019-02-19

## 2019-02-19 RX ORDER — SODIUM CHLORIDE 9 MG/ML
INJECTION, SOLUTION INTRAVENOUS
Status: COMPLETED
Start: 2019-02-19 | End: 2019-02-19

## 2019-02-19 RX ORDER — MIDAZOLAM HYDROCHLORIDE 1 MG/ML
INJECTION INTRAMUSCULAR; INTRAVENOUS
Status: COMPLETED
Start: 2019-02-19 | End: 2019-02-19

## 2019-02-19 RX ORDER — TRAMADOL HYDROCHLORIDE 50 MG/1
100 TABLET ORAL EVERY 6 HOURS PRN
Status: DISCONTINUED | OUTPATIENT
Start: 2019-02-19 | End: 2019-02-20

## 2019-02-19 RX ORDER — ATORVASTATIN CALCIUM 20 MG/1
20 TABLET, FILM COATED ORAL NIGHTLY
Status: DISCONTINUED | OUTPATIENT
Start: 2019-02-19 | End: 2019-02-20

## 2019-02-19 RX ORDER — ASPIRIN 81 MG/1
81 TABLET ORAL DAILY
Status: DISCONTINUED | OUTPATIENT
Start: 2019-02-20 | End: 2019-02-20

## 2019-02-19 RX ORDER — TRAMADOL HYDROCHLORIDE 50 MG/1
50 TABLET ORAL EVERY 6 HOURS PRN
Status: DISCONTINUED | OUTPATIENT
Start: 2019-02-19 | End: 2019-02-20

## 2019-02-19 RX ORDER — HEPARIN SODIUM 1000 [USP'U]/ML
INJECTION, SOLUTION INTRAVENOUS; SUBCUTANEOUS
Status: COMPLETED
Start: 2019-02-19 | End: 2019-02-19

## 2019-02-19 RX ORDER — DIPHENHYDRAMINE HYDROCHLORIDE 50 MG/ML
INJECTION INTRAMUSCULAR; INTRAVENOUS
Status: COMPLETED
Start: 2019-02-19 | End: 2019-02-19

## 2019-02-19 RX ORDER — LIDOCAINE HYDROCHLORIDE 20 MG/ML
INJECTION, SOLUTION EPIDURAL; INFILTRATION; INTRACAUDAL; PERINEURAL
Status: COMPLETED
Start: 2019-02-19 | End: 2019-02-19

## 2019-02-19 RX ORDER — VERAPAMIL HYDROCHLORIDE 2.5 MG/ML
INJECTION, SOLUTION INTRAVENOUS
Status: COMPLETED
Start: 2019-02-19 | End: 2019-02-19

## 2019-02-19 RX ORDER — MIDAZOLAM HYDROCHLORIDE 1 MG/ML
INJECTION INTRAMUSCULAR; INTRAVENOUS
Status: DISCONTINUED
Start: 2019-02-19 | End: 2019-02-19 | Stop reason: WASHOUT

## 2019-02-19 RX ADMIN — SODIUM CHLORIDE: 9 INJECTION, SOLUTION INTRAVENOUS at 08:00:00

## 2019-02-19 RX ADMIN — SODIUM CHLORIDE: 9 INJECTION, SOLUTION INTRAVENOUS at 15:13:00

## 2019-02-19 RX ADMIN — SODIUM CHLORIDE: 9 INJECTION, SOLUTION INTRAVENOUS at 11:15:00

## 2019-02-19 NOTE — PROCEDURES
LM 0%  Proximal LAD 90% calcified  D1 and D2 patent  Mid LAD 40%  Lcx 20-30%  OM1 and OM2 0%  RCA shepherds crook  PDA  And PLV 0%    Groin approach  Angioseal R groin.       STENT PCI PROXIMAL LAD 4.0X32 MM ADIREN  POST DILATED 4.5 X12 MM NC BALLOON  IVUS POS

## 2019-02-19 NOTE — PROGRESS NOTES
Procedure hand off report given to [facility]. Pt's vital signs are stable. Procedural access sites are dry and intact with no signs and symptoms of bleeding and hematoma. Dr. Carolina Chao spoke with patient/family post procedure.      2/19 1240  Bedside h

## 2019-02-19 NOTE — INTERVAL H&P NOTE
Pre-op Diagnosis: * No pre-op diagnosis entered *    The above referenced H&P was reviewed by Thea Simms MD on 2/19/2019, the patient was examined and no significant changes have occurred in the patient's condition since the H&P was performed.   I discu

## 2019-02-19 NOTE — PLAN OF CARE
Problem: Patient Centered Care  Goal: Patient preferences are identified and integrated in the patient's plan of care  Interventions:  - What would you like us to know as we care for you?  Patient states he is relatively healthy  - Provide timely, complete,

## 2019-02-20 ENCOUNTER — PRIOR ORIGINAL RECORDS (OUTPATIENT)
Dept: OTHER | Age: 69
End: 2019-02-20

## 2019-02-20 VITALS
SYSTOLIC BLOOD PRESSURE: 106 MMHG | BODY MASS INDEX: 35 KG/M2 | WEIGHT: 245 LBS | DIASTOLIC BLOOD PRESSURE: 70 MMHG | RESPIRATION RATE: 16 BRPM | HEART RATE: 65 BPM | OXYGEN SATURATION: 95 % | TEMPERATURE: 98 F

## 2019-02-20 LAB
ANION GAP SERPL CALC-SCNC: 7 MMOL/L (ref 0–18)
BUN BLD-MCNC: 14 MG/DL (ref 7–18)
BUN/CREAT SERPL: 12.2 (ref 10–20)
CALCIUM BLD-MCNC: 9 MG/DL (ref 8.5–10.1)
CHLORIDE SERPL-SCNC: 110 MMOL/L (ref 98–107)
CHOLEST SMN-MCNC: 138 MG/DL (ref ?–200)
CO2 SERPL-SCNC: 23 MMOL/L (ref 21–32)
CREAT BLD-MCNC: 1.15 MG/DL (ref 0.7–1.3)
GLUCOSE BLD-MCNC: 122 MG/DL (ref 70–99)
HDLC SERPL-MCNC: 28 MG/DL (ref 40–59)
LDLC SERPL CALC-MCNC: 68 MG/DL (ref ?–100)
NONHDLC SERPL-MCNC: 110 MG/DL (ref ?–130)
OSMOLALITY SERPL CALC.SUM OF ELEC: 292 MOSM/KG (ref 275–295)
POTASSIUM SERPL-SCNC: 4.2 MMOL/L (ref 3.5–5.1)
SODIUM SERPL-SCNC: 140 MMOL/L (ref 136–145)
TRIGL SERPL-MCNC: 210 MG/DL (ref 30–149)

## 2019-02-20 PROCEDURE — 80061 LIPID PANEL: CPT | Performed by: INTERNAL MEDICINE

## 2019-02-20 PROCEDURE — 80048 BASIC METABOLIC PNL TOTAL CA: CPT | Performed by: INTERNAL MEDICINE

## 2019-02-20 RX ORDER — METOPROLOL SUCCINATE 25 MG/1
12.5 TABLET, EXTENDED RELEASE ORAL
Qty: 30 TABLET | Refills: 5 | Status: SHIPPED | OUTPATIENT
Start: 2019-02-20 | End: 2021-11-18

## 2019-02-20 RX ORDER — METOPROLOL SUCCINATE 25 MG/1
12.5 TABLET, EXTENDED RELEASE ORAL
Status: DISCONTINUED | OUTPATIENT
Start: 2019-02-20 | End: 2019-02-20

## 2019-02-20 RX ADMIN — ASPIRIN 81 MG: 81 TABLET ORAL at 09:24:00

## 2019-02-20 RX ADMIN — METOPROLOL SUCCINATE 12.5 MG: 25 TABLET, EXTENDED RELEASE ORAL at 09:24:00

## 2019-02-20 NOTE — PLAN OF CARE
CARDIOVASCULAR - ADULT    • Maintains optimal cardiac output and hemodynamic stability Completed        Patient Centered Care    • Patient preferences are identified and integrated in the patient's plan of care Completed        Patient/Family Goals    • Pa

## 2019-02-20 NOTE — CARDIAC REHAB
Cardiac Rehab Phase I    Activity:  Distance Per self  Assistance needed No  Patient tolerated activity Well per patient. Education:  Handouts provided and reviewed: [address] Diet: Healthy Cardiac diet reviewed.     Disease Pr

## 2019-02-20 NOTE — TRANSITION NOTE
Assessment and Plan:   Chest pain/abn ca score  -s/p  ADRIEN to LAD   -continue asa, statin, brilinta , will start low dose bb - normal ef of stress test 1/2019- will need formal echo as out pt   -check bmp  -follow lipids as out pt increase as able   -cardia daily. Refills:  0     Vitamin D3 1000 units Tabs  Commonly known as:  VITAMIN D3      Take 1,000 Units by mouth daily.    Refills:  0           Where to Get Your Medications      Please  your prescriptions at the location directed by your doctor o

## 2019-02-20 NOTE — PROGRESS NOTES
[facility]    Progress Note    Bárbara Andrade Patient Status:  Outpatient in a Bed    3/29/1950 MRN [id number]   Location [facility] Attending Dixie Carrera MD   Hosp Day # 0 PCP Sesar Lim MD        Subjective:     Resp 12/21/2018    TP 6.8 12/21/2018    AST 22 12/21/2018    ALT 33 12/21/2018    T4F 0.76 10/08/2015    TSH 2.76 12/21/2018    PSA 5.3 (H) 02/07/2019    PSA 4.92 (H) 02/07/2019    DDIMER <0.27 01/14/2019    MG 1.9 05/10/2018    TROP 0.00 01/14/2019     0

## 2019-02-25 LAB
BUN: 14 MG/DL
CALCIUM: 9 MG/DL
CHLORIDE: 110 MEQ/L
CHOLESTEROL, TOTAL: 138 MG/DL
CREATININE, SERUM: 1.15 MG/DL
GLUCOSE: 122 MG/DL
GLUCOSE: 122 MG/DL
HDL CHOLESTEROL: 28 MG/DL
LDL CHOLESTEROL: 68 MG/DL
NON-HDL CHOLESTEROL: 110 MG/DL
SODIUM: 140 MEQ/L
TRIGLYCERIDES: 210 MG/DL

## 2019-02-26 ENCOUNTER — PRIOR ORIGINAL RECORDS (OUTPATIENT)
Dept: OTHER | Age: 69
End: 2019-02-26

## 2019-02-28 VITALS
HEART RATE: 86 BPM | WEIGHT: 245 LBS | HEIGHT: 70 IN | DIASTOLIC BLOOD PRESSURE: 80 MMHG | BODY MASS INDEX: 35.07 KG/M2 | SYSTOLIC BLOOD PRESSURE: 128 MMHG

## 2019-02-28 VITALS
WEIGHT: 241 LBS | HEIGHT: 70 IN | HEART RATE: 76 BPM | SYSTOLIC BLOOD PRESSURE: 130 MMHG | RESPIRATION RATE: 18 BRPM | DIASTOLIC BLOOD PRESSURE: 80 MMHG | BODY MASS INDEX: 34.5 KG/M2

## 2019-03-07 VITALS
DIASTOLIC BLOOD PRESSURE: 66 MMHG | SYSTOLIC BLOOD PRESSURE: 130 MMHG | HEART RATE: 64 BPM | HEIGHT: 70 IN | WEIGHT: 243 LBS | BODY MASS INDEX: 34.79 KG/M2

## 2019-03-10 ENCOUNTER — HOSPITAL ENCOUNTER (OUTPATIENT)
Dept: MRI IMAGING | Facility: HOSPITAL | Age: 69
Discharge: HOME | End: 2019-03-10
Attending: UROLOGY
Payer: MEDICARE

## 2019-03-10 DIAGNOSIS — R97.20 ELEVATED PSA: ICD-10-CM

## 2019-03-10 DIAGNOSIS — N40.2 PROSTATE NODULE: ICD-10-CM

## 2019-03-10 PROCEDURE — 72197 MRI PELVIS W/O & W/DYE: CPT | Performed by: UROLOGY

## 2019-03-10 PROCEDURE — A9575 INJ GADOTERATE MEGLUMI 0.1ML: HCPCS | Performed by: UROLOGY

## 2019-03-12 ENCOUNTER — PATIENT MESSAGE (OUTPATIENT)
Dept: SURGERY | Facility: CLINIC | Age: 69
End: 2019-03-12

## 2019-03-13 ENCOUNTER — CARDPULM VISIT (OUTPATIENT)
Dept: CARDIAC REHAB | Facility: HOSPITAL | Age: 69
End: 2019-03-13
Attending: INTERNAL MEDICINE
Payer: MEDICARE

## 2019-03-13 ENCOUNTER — ORDER TRANSCRIPTION (OUTPATIENT)
Dept: CARDIAC REHAB | Facility: HOSPITAL | Age: 69
End: 2019-03-13

## 2019-03-13 DIAGNOSIS — Z95.5 STENTED CORONARY ARTERY: ICD-10-CM

## 2019-03-13 DIAGNOSIS — Z95.5 STENTED CORONARY ARTERY: Primary | ICD-10-CM

## 2019-03-14 ENCOUNTER — TELEPHONE (OUTPATIENT)
Dept: SURGERY | Facility: CLINIC | Age: 69
End: 2019-03-14

## 2019-03-14 RX ORDER — METOPROLOL SUCCINATE 50 MG/1
50 TABLET, EXTENDED RELEASE ORAL DAILY
COMMUNITY
End: 2019-10-21 | Stop reason: SDUPTHER

## 2019-03-14 RX ORDER — FENOFIBRATE 160 MG/1
TABLET ORAL
COMMUNITY
Start: 2018-03-02

## 2019-03-14 RX ORDER — ATORVASTATIN CALCIUM 20 MG/1
TABLET, FILM COATED ORAL
COMMUNITY
Start: 2018-03-02

## 2019-03-14 NOTE — TELEPHONE ENCOUNTER
Patient contacted. Patient states he received a call from our office and he has an appt in our office tomorrow. No further action required.      See today's other TE.

## 2019-03-14 NOTE — TELEPHONE ENCOUNTER
Pt called stating pt received the mri results thru mychart. Positive for cancer. Pt has questions.   Call

## 2019-03-14 NOTE — TELEPHONE ENCOUNTER
From: Sandeep Martell  To: Tomer Jacobo MD  Sent: 3/12/2019 3:20 PM CDT  Subject: Visit Follow-up Question    On March 10 I went for an MRI in regards to the prostrate.  I was wondering when I may get some results from this test?  Thank you very much,

## 2019-03-14 NOTE — TELEPHONE ENCOUNTER
I sent patient the following message by means of \"my chart\" =  \"Abhijeet,    MRI is a very complex study,  and it takes much longer before data processing is completed, and then the radiologist reads it,  and then subsequently radiologist has to approve

## 2019-03-15 ENCOUNTER — OFFICE VISIT (OUTPATIENT)
Dept: SURGERY | Facility: CLINIC | Age: 69
End: 2019-03-15
Payer: MEDICARE

## 2019-03-15 VITALS
TEMPERATURE: 98 F | HEART RATE: 59 BPM | SYSTOLIC BLOOD PRESSURE: 148 MMHG | BODY MASS INDEX: 35.18 KG/M2 | DIASTOLIC BLOOD PRESSURE: 82 MMHG | WEIGHT: 243 LBS | HEIGHT: 69.75 IN

## 2019-03-15 DIAGNOSIS — N40.2 PROSTATE NODULE: ICD-10-CM

## 2019-03-15 DIAGNOSIS — R35.1 BENIGN PROSTATIC HYPERPLASIA WITH NOCTURIA: ICD-10-CM

## 2019-03-15 DIAGNOSIS — R97.20 ELEVATED PSA: Primary | ICD-10-CM

## 2019-03-15 DIAGNOSIS — N52.01 ERECTILE DYSFUNCTION DUE TO ARTERIAL INSUFFICIENCY: ICD-10-CM

## 2019-03-15 DIAGNOSIS — N40.1 BENIGN PROSTATIC HYPERPLASIA WITH NOCTURIA: ICD-10-CM

## 2019-03-15 DIAGNOSIS — R35.1 NOCTURIA: ICD-10-CM

## 2019-03-15 PROCEDURE — G0463 HOSPITAL OUTPT CLINIC VISIT: HCPCS | Performed by: UROLOGY

## 2019-03-15 PROCEDURE — 99215 OFFICE O/P EST HI 40 MIN: CPT | Performed by: UROLOGY

## 2019-03-15 NOTE — PROGRESS NOTES
HPI:    Patient ID: Dave Farley is a 77year old male. HPI     Elevated PSA  Recent PSA of 4.92 on 2/7/2019.  Patient denies associated symptoms to suggest prostate cancer such as weight loss or loss of appetite or bone pain and denies associated symp prostate nodule for over 10 years; prominent right to mid base 1.5 cm nodule raised; AUA symptom score continues at a level of 0;  4 different transrectal US and prostate biopsies: 1999, 2000, 2004; when PSA = 4.1, US showed prostate 24 g 12 biopsies benig not nervous/anxious. Current Outpatient Medications:  ticagrelor 90 MG Oral Tab Take 1 tablet (90 mg total) by mouth 2 (two) times daily.  Disp: 60 tablet Rfl: 11   Metoprolol Succinate ER 25 MG Oral Tablet 24 Hr Take 0.5 tablets (12.5 mg total) and Other Disorders Associated Father    • Obesity Father    • Heart Attack Father 50        Per NG: massive heart attack ( cause of death)   • Cancer Mother 67        Per NG: leukemia ( cause of death)   • Other (Other) Maternal Grandmother    • Other (Ot microscopic indicated  2/17/2017 PSA = 3.3  9/2/16 PSA = 2.6  10/8/15 UA: RBC = 22; WBC = 3  8/10/15 PSA = 5.0  12/16/14 PSA = 3.6    UROLOGICAL IMAGING   03/10/2019 MRI Prostate = The prostate gland measures 4.8 x 4.1 x 4.3 cm, for a calculated volume of to be cancerous although the MRI showed another suspicious lesion of the prostate and I recommend Biopsy of the prostate and the patient understands and agrees to this; he would like this with Diazepam 10 mg.  The patient had a stent placed on 2/19/2019 by continue observation.    (R35.1) Nocturia  Current AUA score is 0, no voiding dysfunction.  Patient is not bothered by this problem and feels this problem is stable; I discussed, explained, and answered questions on treatment options and patient understood

## 2019-03-15 NOTE — PATIENT INSTRUCTIONS
Margy Mcdermott M.D.    1.   Visit August 2019. Please get blood draw for PSA--total and free 1--7 days before visit. NO sexual stimulation for 5 days before PSA blood draw    2.    Please continue anticoagulation medication

## 2019-03-18 ENCOUNTER — CARDPULM VISIT (OUTPATIENT)
Dept: CARDIAC REHAB | Facility: HOSPITAL | Age: 69
End: 2019-03-18
Attending: INTERNAL MEDICINE
Payer: MEDICARE

## 2019-03-18 PROCEDURE — 93798 PHYS/QHP OP CAR RHAB W/ECG: CPT

## 2019-03-20 ENCOUNTER — CARDPULM VISIT (OUTPATIENT)
Dept: CARDIAC REHAB | Facility: HOSPITAL | Age: 69
End: 2019-03-20
Attending: INTERNAL MEDICINE
Payer: MEDICARE

## 2019-03-20 PROCEDURE — 93798 PHYS/QHP OP CAR RHAB W/ECG: CPT

## 2019-03-21 ENCOUNTER — ANCILLARY PROCEDURE (OUTPATIENT)
Dept: CARDIOLOGY | Age: 69
End: 2019-03-21
Attending: INTERNAL MEDICINE

## 2019-03-21 DIAGNOSIS — R07.89 CHEST DISCOMFORT: ICD-10-CM

## 2019-03-21 PROCEDURE — 93306 TTE W/DOPPLER COMPLETE: CPT | Performed by: INTERNAL MEDICINE

## 2019-03-22 ENCOUNTER — CARDPULM VISIT (OUTPATIENT)
Dept: CARDIAC REHAB | Facility: HOSPITAL | Age: 69
End: 2019-03-22
Attending: INTERNAL MEDICINE
Payer: MEDICARE

## 2019-03-22 PROCEDURE — 93798 PHYS/QHP OP CAR RHAB W/ECG: CPT

## 2019-03-25 ENCOUNTER — CARDPULM VISIT (OUTPATIENT)
Dept: CARDIAC REHAB | Facility: HOSPITAL | Age: 69
End: 2019-03-25
Attending: INTERNAL MEDICINE
Payer: MEDICARE

## 2019-03-25 PROCEDURE — 93798 PHYS/QHP OP CAR RHAB W/ECG: CPT

## 2019-03-27 ENCOUNTER — CARDPULM VISIT (OUTPATIENT)
Dept: CARDIAC REHAB | Facility: HOSPITAL | Age: 69
End: 2019-03-27
Attending: INTERNAL MEDICINE
Payer: MEDICARE

## 2019-03-27 PROBLEM — Z86.69 HISTORY OF SLEEP APNEA: Status: ACTIVE | Noted: 2019-03-27

## 2019-03-27 PROBLEM — R07.9 CHEST PAIN: Status: ACTIVE | Noted: 2019-03-27

## 2019-03-27 PROBLEM — K76.0 FATTY LIVER: Status: ACTIVE | Noted: 2019-03-27

## 2019-03-27 PROBLEM — R93.1 ABNORMAL HEART SCORE CT: Status: ACTIVE | Noted: 2019-03-27

## 2019-03-27 PROBLEM — M19.90 OSTEOARTHRITIS: Status: ACTIVE | Noted: 2019-03-27

## 2019-03-27 PROBLEM — E78.5 HYPERLIPIDEMIA: Status: ACTIVE | Noted: 2019-03-27

## 2019-03-27 PROCEDURE — 93798 PHYS/QHP OP CAR RHAB W/ECG: CPT

## 2019-03-27 RX ORDER — MELATONIN
1000
COMMUNITY
End: 2019-04-01 | Stop reason: CLARIF

## 2019-03-27 RX ORDER — UBIDECARENONE 75 MG
100 CAPSULE ORAL
COMMUNITY
End: 2019-04-01 | Stop reason: CLARIF

## 2019-03-29 ENCOUNTER — CARDPULM VISIT (OUTPATIENT)
Dept: CARDIAC REHAB | Facility: HOSPITAL | Age: 69
End: 2019-03-29
Attending: INTERNAL MEDICINE
Payer: MEDICARE

## 2019-03-29 PROCEDURE — 93798 PHYS/QHP OP CAR RHAB W/ECG: CPT

## 2019-04-01 ENCOUNTER — APPOINTMENT (OUTPATIENT)
Dept: CARDIAC REHAB | Facility: HOSPITAL | Age: 69
End: 2019-04-01
Attending: INTERNAL MEDICINE
Payer: MEDICARE

## 2019-04-01 ENCOUNTER — OFFICE VISIT (OUTPATIENT)
Dept: CARDIOLOGY | Age: 69
End: 2019-04-01

## 2019-04-01 VITALS
HEIGHT: 69 IN | HEART RATE: 68 BPM | BODY MASS INDEX: 35.99 KG/M2 | SYSTOLIC BLOOD PRESSURE: 114 MMHG | DIASTOLIC BLOOD PRESSURE: 62 MMHG | WEIGHT: 243 LBS

## 2019-04-01 DIAGNOSIS — E78.49 OTHER HYPERLIPIDEMIA: ICD-10-CM

## 2019-04-01 DIAGNOSIS — R07.82 INTERCOSTAL PAIN: Primary | ICD-10-CM

## 2019-04-01 DIAGNOSIS — R06.02 SHORTNESS OF BREATH: ICD-10-CM

## 2019-04-01 PROCEDURE — 93798 PHYS/QHP OP CAR RHAB W/ECG: CPT

## 2019-04-01 PROCEDURE — 99214 OFFICE O/P EST MOD 30 MIN: CPT | Performed by: INTERNAL MEDICINE

## 2019-04-03 ENCOUNTER — APPOINTMENT (OUTPATIENT)
Dept: CARDIAC REHAB | Facility: HOSPITAL | Age: 69
End: 2019-04-03
Attending: INTERNAL MEDICINE
Payer: MEDICARE

## 2019-04-03 ENCOUNTER — MED REC SCAN ONLY (OUTPATIENT)
Dept: INTERNAL MEDICINE CLINIC | Facility: CLINIC | Age: 69
End: 2019-04-03

## 2019-04-03 PROCEDURE — 93798 PHYS/QHP OP CAR RHAB W/ECG: CPT

## 2019-04-05 ENCOUNTER — APPOINTMENT (OUTPATIENT)
Dept: CARDIAC REHAB | Facility: HOSPITAL | Age: 69
End: 2019-04-05
Attending: INTERNAL MEDICINE
Payer: MEDICARE

## 2019-04-05 PROCEDURE — 93798 PHYS/QHP OP CAR RHAB W/ECG: CPT

## 2019-04-06 DIAGNOSIS — E78.2 MIXED HYPERLIPIDEMIA: ICD-10-CM

## 2019-04-07 DIAGNOSIS — E78.2 MIXED HYPERLIPIDEMIA: ICD-10-CM

## 2019-04-07 RX ORDER — FENOFIBRATE 160 MG/1
TABLET ORAL
Qty: 90 TABLET | Refills: 2 | Status: SHIPPED | OUTPATIENT
Start: 2019-04-07 | End: 2019-07-01

## 2019-04-08 ENCOUNTER — CARDPULM VISIT (OUTPATIENT)
Dept: CARDIAC REHAB | Facility: HOSPITAL | Age: 69
End: 2019-04-08
Attending: INTERNAL MEDICINE
Payer: MEDICARE

## 2019-04-08 DIAGNOSIS — E78.2 MIXED HYPERLIPIDEMIA: ICD-10-CM

## 2019-04-08 PROCEDURE — 93798 PHYS/QHP OP CAR RHAB W/ECG: CPT

## 2019-04-08 RX ORDER — FENOFIBRATE 160 MG/1
TABLET ORAL
Qty: 90 TABLET | Refills: 0 | OUTPATIENT
Start: 2019-04-08

## 2019-04-10 ENCOUNTER — APPOINTMENT (OUTPATIENT)
Dept: CARDIAC REHAB | Facility: HOSPITAL | Age: 69
End: 2019-04-10
Attending: INTERNAL MEDICINE
Payer: MEDICARE

## 2019-04-10 PROCEDURE — 93798 PHYS/QHP OP CAR RHAB W/ECG: CPT

## 2019-04-12 ENCOUNTER — APPOINTMENT (OUTPATIENT)
Dept: CARDIAC REHAB | Facility: HOSPITAL | Age: 69
End: 2019-04-12
Attending: INTERNAL MEDICINE
Payer: MEDICARE

## 2019-04-12 PROCEDURE — 93798 PHYS/QHP OP CAR RHAB W/ECG: CPT

## 2019-04-15 ENCOUNTER — CARDPULM VISIT (OUTPATIENT)
Dept: CARDIAC REHAB | Facility: HOSPITAL | Age: 69
End: 2019-04-15
Attending: INTERNAL MEDICINE
Payer: MEDICARE

## 2019-04-15 PROCEDURE — 93798 PHYS/QHP OP CAR RHAB W/ECG: CPT

## 2019-04-16 DIAGNOSIS — E78.2 MIXED HYPERLIPIDEMIA: ICD-10-CM

## 2019-04-16 NOTE — TELEPHONE ENCOUNTER
Refill passed per [facility] protocol  Cholesterol Medications  Protocol Criteria:  · Appointment scheduled in the past 12 months or in the next 3 months  · ALT & LDL on file in the past 12 months  · ALT result < 80  · LDL result <130   Recent Outpati [address] Cardiopulmonary Rehab [place] Stent 2/19/19    In 3 weeks [facility] CARD PHASE 2 [address] Cardiopulmonary Rehab [place]ent 2/19/19    In 3 weeks [facility] CARD PHASE 2 [address] [facility]

## 2019-04-16 NOTE — TELEPHONE ENCOUNTER
please see refill request. I tried to sign it and got a Duplicate therapy alert. Please sign to override.  Thanks

## 2019-04-17 ENCOUNTER — CARDPULM VISIT (OUTPATIENT)
Dept: CARDIAC REHAB | Facility: HOSPITAL | Age: 69
End: 2019-04-17
Attending: INTERNAL MEDICINE
Payer: MEDICARE

## 2019-04-17 PROCEDURE — 93798 PHYS/QHP OP CAR RHAB W/ECG: CPT

## 2019-04-18 RX ORDER — ATORVASTATIN CALCIUM 20 MG/1
TABLET, FILM COATED ORAL
Qty: 90 TABLET | Refills: 1 | Status: SHIPPED | OUTPATIENT
Start: 2019-04-18 | End: 2019-10-08

## 2019-04-19 ENCOUNTER — CARDPULM VISIT (OUTPATIENT)
Dept: CARDIAC REHAB | Facility: HOSPITAL | Age: 69
End: 2019-04-19
Attending: INTERNAL MEDICINE
Payer: MEDICARE

## 2019-04-19 PROCEDURE — 93798 PHYS/QHP OP CAR RHAB W/ECG: CPT

## 2019-04-22 ENCOUNTER — CARDPULM VISIT (OUTPATIENT)
Dept: CARDIAC REHAB | Facility: HOSPITAL | Age: 69
End: 2019-04-22
Attending: INTERNAL MEDICINE
Payer: MEDICARE

## 2019-04-22 PROCEDURE — 93798 PHYS/QHP OP CAR RHAB W/ECG: CPT

## 2019-04-24 ENCOUNTER — APPOINTMENT (OUTPATIENT)
Dept: CARDIAC REHAB | Facility: HOSPITAL | Age: 69
End: 2019-04-24
Attending: INTERNAL MEDICINE
Payer: MEDICARE

## 2019-04-24 ENCOUNTER — MED REC SCAN ONLY (OUTPATIENT)
Dept: INTERNAL MEDICINE CLINIC | Facility: CLINIC | Age: 69
End: 2019-04-24

## 2019-04-24 PROCEDURE — 93798 PHYS/QHP OP CAR RHAB W/ECG: CPT

## 2019-04-26 ENCOUNTER — CARDPULM VISIT (OUTPATIENT)
Dept: CARDIAC REHAB | Facility: HOSPITAL | Age: 69
End: 2019-04-26
Attending: INTERNAL MEDICINE
Payer: MEDICARE

## 2019-04-26 PROCEDURE — 93798 PHYS/QHP OP CAR RHAB W/ECG: CPT

## 2019-04-29 ENCOUNTER — CARDPULM VISIT (OUTPATIENT)
Dept: CARDIAC REHAB | Facility: HOSPITAL | Age: 69
End: 2019-04-29
Attending: INTERNAL MEDICINE
Payer: MEDICARE

## 2019-04-29 PROCEDURE — 93798 PHYS/QHP OP CAR RHAB W/ECG: CPT

## 2019-05-01 ENCOUNTER — CARDPULM VISIT (OUTPATIENT)
Dept: CARDIAC REHAB | Facility: HOSPITAL | Age: 69
End: 2019-05-01
Attending: INTERNAL MEDICINE
Payer: MEDICARE

## 2019-05-01 PROCEDURE — 93798 PHYS/QHP OP CAR RHAB W/ECG: CPT

## 2019-05-03 ENCOUNTER — APPOINTMENT (OUTPATIENT)
Dept: CARDIAC REHAB | Facility: HOSPITAL | Age: 69
End: 2019-05-03
Attending: INTERNAL MEDICINE
Payer: MEDICARE

## 2019-05-03 PROCEDURE — 93798 PHYS/QHP OP CAR RHAB W/ECG: CPT

## 2019-05-06 ENCOUNTER — CARDPULM VISIT (OUTPATIENT)
Dept: CARDIAC REHAB | Facility: HOSPITAL | Age: 69
End: 2019-05-06
Attending: INTERNAL MEDICINE
Payer: MEDICARE

## 2019-05-06 PROCEDURE — 93798 PHYS/QHP OP CAR RHAB W/ECG: CPT

## 2019-05-08 ENCOUNTER — APPOINTMENT (OUTPATIENT)
Dept: CARDIAC REHAB | Facility: HOSPITAL | Age: 69
End: 2019-05-08
Attending: INTERNAL MEDICINE
Payer: MEDICARE

## 2019-05-10 ENCOUNTER — APPOINTMENT (OUTPATIENT)
Dept: CARDIAC REHAB | Facility: HOSPITAL | Age: 69
End: 2019-05-10
Attending: INTERNAL MEDICINE
Payer: MEDICARE

## 2019-05-13 ENCOUNTER — APPOINTMENT (OUTPATIENT)
Dept: CARDIAC REHAB | Facility: HOSPITAL | Age: 69
End: 2019-05-13
Attending: INTERNAL MEDICINE
Payer: MEDICARE

## 2019-05-13 PROCEDURE — 93798 PHYS/QHP OP CAR RHAB W/ECG: CPT

## 2019-05-15 ENCOUNTER — APPOINTMENT (OUTPATIENT)
Dept: CARDIAC REHAB | Facility: HOSPITAL | Age: 69
End: 2019-05-15
Attending: INTERNAL MEDICINE
Payer: MEDICARE

## 2019-05-15 PROCEDURE — 93798 PHYS/QHP OP CAR RHAB W/ECG: CPT

## 2019-05-17 ENCOUNTER — APPOINTMENT (OUTPATIENT)
Dept: CARDIAC REHAB | Facility: HOSPITAL | Age: 69
End: 2019-05-17
Attending: INTERNAL MEDICINE
Payer: MEDICARE

## 2019-05-17 PROCEDURE — 93798 PHYS/QHP OP CAR RHAB W/ECG: CPT

## 2019-05-20 ENCOUNTER — APPOINTMENT (OUTPATIENT)
Dept: CARDIAC REHAB | Facility: HOSPITAL | Age: 69
End: 2019-05-20
Attending: INTERNAL MEDICINE
Payer: MEDICARE

## 2019-05-20 PROCEDURE — 93798 PHYS/QHP OP CAR RHAB W/ECG: CPT

## 2019-05-22 ENCOUNTER — CARDPULM VISIT (OUTPATIENT)
Dept: CARDIAC REHAB | Facility: HOSPITAL | Age: 69
End: 2019-05-22
Attending: INTERNAL MEDICINE
Payer: MEDICARE

## 2019-05-22 PROCEDURE — 93798 PHYS/QHP OP CAR RHAB W/ECG: CPT

## 2019-05-24 ENCOUNTER — APPOINTMENT (OUTPATIENT)
Dept: CARDIAC REHAB | Facility: HOSPITAL | Age: 69
End: 2019-05-24
Attending: INTERNAL MEDICINE
Payer: MEDICARE

## 2019-05-24 PROCEDURE — 93798 PHYS/QHP OP CAR RHAB W/ECG: CPT

## 2019-05-29 ENCOUNTER — APPOINTMENT (OUTPATIENT)
Dept: CARDIAC REHAB | Facility: HOSPITAL | Age: 69
End: 2019-05-29
Attending: INTERNAL MEDICINE
Payer: MEDICARE

## 2019-05-29 PROCEDURE — 93798 PHYS/QHP OP CAR RHAB W/ECG: CPT

## 2019-05-31 ENCOUNTER — CARDPULM VISIT (OUTPATIENT)
Dept: CARDIAC REHAB | Facility: HOSPITAL | Age: 69
End: 2019-05-31
Attending: INTERNAL MEDICINE
Payer: MEDICARE

## 2019-05-31 PROCEDURE — 93798 PHYS/QHP OP CAR RHAB W/ECG: CPT

## 2019-06-03 ENCOUNTER — APPOINTMENT (OUTPATIENT)
Dept: CARDIAC REHAB | Facility: HOSPITAL | Age: 69
End: 2019-06-03
Attending: INTERNAL MEDICINE
Payer: MEDICARE

## 2019-06-03 PROCEDURE — 93798 PHYS/QHP OP CAR RHAB W/ECG: CPT

## 2019-06-05 ENCOUNTER — CARDPULM VISIT (OUTPATIENT)
Dept: CARDIAC REHAB | Facility: HOSPITAL | Age: 69
End: 2019-06-05
Attending: INTERNAL MEDICINE
Payer: MEDICARE

## 2019-06-05 PROCEDURE — 93798 PHYS/QHP OP CAR RHAB W/ECG: CPT

## 2019-06-07 ENCOUNTER — APPOINTMENT (OUTPATIENT)
Dept: CARDIAC REHAB | Facility: HOSPITAL | Age: 69
End: 2019-06-07
Attending: INTERNAL MEDICINE
Payer: MEDICARE

## 2019-06-10 ENCOUNTER — APPOINTMENT (OUTPATIENT)
Dept: CARDIAC REHAB | Facility: HOSPITAL | Age: 69
End: 2019-06-10
Attending: INTERNAL MEDICINE
Payer: MEDICARE

## 2019-06-12 ENCOUNTER — APPOINTMENT (OUTPATIENT)
Dept: CARDIAC REHAB | Facility: HOSPITAL | Age: 69
End: 2019-06-12
Attending: INTERNAL MEDICINE
Payer: MEDICARE

## 2019-06-14 ENCOUNTER — APPOINTMENT (OUTPATIENT)
Dept: CARDIAC REHAB | Facility: HOSPITAL | Age: 69
End: 2019-06-14
Attending: INTERNAL MEDICINE
Payer: MEDICARE

## 2019-06-17 ENCOUNTER — APPOINTMENT (OUTPATIENT)
Dept: CARDIAC REHAB | Facility: HOSPITAL | Age: 69
End: 2019-06-17
Attending: INTERNAL MEDICINE
Payer: MEDICARE

## 2019-06-17 DIAGNOSIS — E78.2 MIXED HYPERLIPIDEMIA: ICD-10-CM

## 2019-06-18 RX ORDER — FENOFIBRATE 160 MG/1
TABLET ORAL
Qty: 90 TABLET | Refills: 1 | Status: SHIPPED | OUTPATIENT
Start: 2019-06-18 | End: 2019-07-01

## 2019-06-18 NOTE — TELEPHONE ENCOUNTER
Refill passed per [facility] protocol.   Cholesterol Medications  Protocol Criteria:  · Appointment scheduled in the past 12 months or in the next 3 months  · ALT & LDL on file in the past 12 months  · ALT result < 80  · LDL result <130   Recent Outpat

## 2019-06-21 ENCOUNTER — CLINICAL ABSTRACT (OUTPATIENT)
Dept: CARDIOLOGY | Age: 69
End: 2019-06-21

## 2019-06-21 ENCOUNTER — TELEPHONE (OUTPATIENT)
Dept: CARDIOLOGY | Age: 69
End: 2019-06-21

## 2019-06-21 ENCOUNTER — LAB ENCOUNTER (OUTPATIENT)
Dept: LAB | Facility: HOSPITAL | Age: 69
End: 2019-06-21
Attending: INTERNAL MEDICINE
Payer: MEDICARE

## 2019-06-21 DIAGNOSIS — E78.49 OTHER HYPERLIPIDEMIA: Primary | ICD-10-CM

## 2019-06-21 DIAGNOSIS — R73.09 ELEVATED GLUCOSE: ICD-10-CM

## 2019-06-21 LAB
ALT SERPL-CCNC: 33 U/L
AST SERPL-CCNC: 19 U/L
CHOLEST SERPL-MCNC: 123 MG/DL
CHOLEST/HDLC SERPL: NORMAL {RATIO}
HDLC SERPL-MCNC: 31 MG/DL
LDLC SERPL CALC-MCNC: 71 MG/DL
LENGTH OF FAST TIME PATIENT: NORMAL H
NONHDLC SERPL-MCNC: NORMAL MG/DL
TRIGL SERPL-MCNC: 103 MG/DL
VLDLC SERPL CALC-MCNC: NORMAL MG/DL

## 2019-06-21 PROCEDURE — 80061 LIPID PANEL: CPT

## 2019-06-21 PROCEDURE — 84460 ALANINE AMINO (ALT) (SGPT): CPT

## 2019-06-21 PROCEDURE — 36415 COLL VENOUS BLD VENIPUNCTURE: CPT

## 2019-06-21 PROCEDURE — 84450 TRANSFERASE (AST) (SGOT): CPT

## 2019-06-21 PROCEDURE — 83036 HEMOGLOBIN GLYCOSYLATED A1C: CPT

## 2019-06-25 NOTE — PROGRESS NOTES
Please call pt:  (pt viewed results PRIOR to my message)  Unfortunately your A1C went up.  Normal is less than 5.7.  Diabetes is greater than 6.4.  Your level is 6.5.  You have developed diabetes.  Please schedule an appointment to discuss this with me. William Christopher

## 2019-07-01 ENCOUNTER — OFFICE VISIT (OUTPATIENT)
Dept: CARDIOLOGY | Age: 69
End: 2019-07-01

## 2019-07-01 ENCOUNTER — OFFICE VISIT (OUTPATIENT)
Dept: INTERNAL MEDICINE CLINIC | Facility: CLINIC | Age: 69
End: 2019-07-01
Payer: MEDICARE

## 2019-07-01 VITALS
HEIGHT: 69.75 IN | SYSTOLIC BLOOD PRESSURE: 135 MMHG | WEIGHT: 230.81 LBS | DIASTOLIC BLOOD PRESSURE: 81 MMHG | HEART RATE: 60 BPM | BODY MASS INDEX: 33.42 KG/M2

## 2019-07-01 VITALS
SYSTOLIC BLOOD PRESSURE: 130 MMHG | BODY MASS INDEX: 32.93 KG/M2 | HEIGHT: 70 IN | DIASTOLIC BLOOD PRESSURE: 70 MMHG | HEART RATE: 51 BPM | WEIGHT: 230 LBS | OXYGEN SATURATION: 97 %

## 2019-07-01 DIAGNOSIS — M19.90 OSTEOARTHRITIS, UNSPECIFIED OSTEOARTHRITIS TYPE, UNSPECIFIED SITE: ICD-10-CM

## 2019-07-01 DIAGNOSIS — R97.20 ELEVATED PSA: ICD-10-CM

## 2019-07-01 DIAGNOSIS — Z98.61 HISTORY OF PERCUTANEOUS CORONARY INTERVENTION: ICD-10-CM

## 2019-07-01 DIAGNOSIS — R73.03 PREDIABETES: Primary | ICD-10-CM

## 2019-07-01 DIAGNOSIS — R07.82 INTERCOSTAL PAIN: ICD-10-CM

## 2019-07-01 DIAGNOSIS — E78.49 OTHER HYPERLIPIDEMIA: ICD-10-CM

## 2019-07-01 DIAGNOSIS — R06.02 SHORTNESS OF BREATH: Primary | ICD-10-CM

## 2019-07-01 PROBLEM — R07.9 CHEST PAIN WITH HIGH RISK FOR CARDIAC ETIOLOGY: Status: RESOLVED | Noted: 2019-01-14 | Resolved: 2019-07-01

## 2019-07-01 PROBLEM — R07.89 CHEST DISCOMFORT: Status: RESOLVED | Noted: 2019-02-19 | Resolved: 2019-07-01

## 2019-07-01 PROCEDURE — 99214 OFFICE O/P EST MOD 30 MIN: CPT | Performed by: INTERNAL MEDICINE

## 2019-07-01 PROCEDURE — G0463 HOSPITAL OUTPT CLINIC VISIT: HCPCS | Performed by: INTERNAL MEDICINE

## 2019-07-01 RX ORDER — METFORMIN HYDROCHLORIDE 500 MG/1
500 TABLET, EXTENDED RELEASE ORAL
Qty: 30 TABLET | Refills: 5 | Status: SHIPPED | OUTPATIENT
Start: 2019-07-01 | End: 2019-12-08

## 2019-07-01 NOTE — PATIENT INSTRUCTIONS
Do non-fasting labs prior to the next appointment. Please schedule that appointment for early October.

## 2019-07-01 NOTE — ASSESSMENT & PLAN NOTE
The patient had an abnormal MRI has a prostate nodule. He needs a prostate biopsy but needs to be off the blood thinners. His urologist has discussed with his cardiologist on appropriate timing of the biopsy.   This will be scheduled within the next 7 mon

## 2019-07-01 NOTE — PROGRESS NOTES
HPI:    Patient ID: Dave Farley is a 68year old male. Pt has been getting steroid shots in his knees and wrists every 6 months. He has prostate cancer. He has a heart stent. His A1C went up to 6.5. Blood Sugar   This is a new problem.  The curr daily. Disp: 60 tablet Rfl: 11   Metoprolol Succinate ER 25 MG Oral Tablet 24 Hr Take 0.5 tablets (12.5 mg total) by mouth Daily Beta Blocker. Disp: 30 tablet Rfl: 5   Vitamin B-12 100 MCG Oral Tab Take 100 mcg by mouth daily.  Disp:  Rfl:    Vitamin D3 100 Normocephalic and atraumatic. Eyes: EOM are normal.   Cardiovascular: Normal rate, regular rhythm and normal heart sounds. No murmur heard. Pulmonary/Chest: Effort normal and breath sounds normal. No respiratory distress. He has no wheezes.    Neurolo Prescriptions     Signed Prescriptions Disp Refills   • metFORMIN HCl  MG Oral Tablet 24 Hr 30 tablet 5     Sig: Take 1 tablet (500 mg total) by mouth daily with breakfast.

## 2019-07-01 NOTE — ASSESSMENT & PLAN NOTE
Pt had a PCI earlier this year and now has finished cardiac rehab. He will continue to f/u with Dr. Hooper Leader.

## 2019-07-01 NOTE — ASSESSMENT & PLAN NOTE
The pt had two A1C test results which were 6.5. He has been getting steroid injections in his knees and wrists every 6 months for the past several years and plans to continue this as long as it continues to help his pain.   This likely contributes to the e

## 2019-07-17 ENCOUNTER — NURSE TRIAGE (OUTPATIENT)
Dept: INTERNAL MEDICINE CLINIC | Facility: CLINIC | Age: 69
End: 2019-07-17

## 2019-07-17 NOTE — TELEPHONE ENCOUNTER
Action Requested: Summary for Provider     []  Critical Lab, Recommendations Needed  [] Need Additional Advice  []   FYI    []   Need Orders  [] Need Medications Sent to Pharmacy  []  Other     SUMMARY: OV within 3 days; scheduled with PJ on 7/18 at 11:50a

## 2019-07-17 NOTE — TELEPHONE ENCOUNTER
Reason for Disposition  • MODERATE pain (e.g., interferes with normal activities, limping) and present > 3 days    Protocols used: FOOT PAIN-A-OH

## 2019-07-18 ENCOUNTER — OFFICE VISIT (OUTPATIENT)
Dept: INTERNAL MEDICINE CLINIC | Facility: CLINIC | Age: 69
End: 2019-07-18
Payer: MEDICARE

## 2019-07-18 VITALS
HEART RATE: 57 BPM | WEIGHT: 235.31 LBS | BODY MASS INDEX: 34.07 KG/M2 | DIASTOLIC BLOOD PRESSURE: 80 MMHG | TEMPERATURE: 98 F | HEIGHT: 69.75 IN | SYSTOLIC BLOOD PRESSURE: 145 MMHG

## 2019-07-18 DIAGNOSIS — M21.41 PES PLANUS OF BOTH FEET: Primary | ICD-10-CM

## 2019-07-18 DIAGNOSIS — M79.672 LEFT FOOT PAIN: ICD-10-CM

## 2019-07-18 DIAGNOSIS — I10 ESSENTIAL HYPERTENSION: ICD-10-CM

## 2019-07-18 DIAGNOSIS — M21.42 PES PLANUS OF BOTH FEET: Primary | ICD-10-CM

## 2019-07-18 PROCEDURE — G0463 HOSPITAL OUTPT CLINIC VISIT: HCPCS | Performed by: INTERNAL MEDICINE

## 2019-07-18 PROCEDURE — 99213 OFFICE O/P EST LOW 20 MIN: CPT | Performed by: INTERNAL MEDICINE

## 2019-07-18 NOTE — PROGRESS NOTES
Nate Persaud is a 68year old male. Patient presents with: Foot Pain: left foot      HPI:     HPI  Patient is a 29-year-old male here with complaints of left foot pain that started about 2 weeks ago.   Reports that he has the pain on the ball of the lef Allergic rhinitis     Per NG: desensitization: mold and dust mite   • Esophageal reflux    • Fx wrist     Per NG: Fx both wrists   • Hemorrhoids    • High cholesterol    • Other and unspecified hyperlipidemia    • Peptic ulcer disease    • Sleep apnea Foot exam.  Bilateral foot-normal knee exam.  No redness, swelling or warmth. Left foot. Flat arch. No tenderness of the plantar aspect of the left foot. No swelling of the ankle. No palpable mass or abnormalities.   Range of movements of the left an

## 2019-08-05 ENCOUNTER — LAB ENCOUNTER (OUTPATIENT)
Dept: LAB | Facility: HOSPITAL | Age: 69
End: 2019-08-05
Attending: UROLOGY
Payer: MEDICARE

## 2019-08-05 DIAGNOSIS — R35.1 BENIGN PROSTATIC HYPERPLASIA WITH NOCTURIA: ICD-10-CM

## 2019-08-05 DIAGNOSIS — N40.1 BENIGN PROSTATIC HYPERPLASIA WITH NOCTURIA: ICD-10-CM

## 2019-08-05 DIAGNOSIS — R97.20 ELEVATED PSA: ICD-10-CM

## 2019-08-05 LAB
BILIRUB UR QL: NEGATIVE
CLARITY UR: CLEAR
COLOR UR: YELLOW
GLUCOSE UR-MCNC: NEGATIVE MG/DL
HGB UR QL STRIP.AUTO: NEGATIVE
KETONES UR-MCNC: NEGATIVE MG/DL
LEUKOCYTE ESTERASE UR QL STRIP.AUTO: NEGATIVE
NITRITE UR QL STRIP.AUTO: NEGATIVE
PH UR: 6 [PH] (ref 5–8)
PROT UR-MCNC: NEGATIVE MG/DL
PSA FREE MFR SERPL: 12 %
PSA FREE SERPL-MCNC: 0.5 NG/ML
PSA SERPL-MCNC: 4.29 NG/ML (ref ?–4)
SP GR UR STRIP: 1.01 (ref 1–1.03)
UROBILINOGEN UR STRIP-ACNC: <2
VIT C UR-MCNC: NEGATIVE MG/DL

## 2019-08-05 PROCEDURE — 81003 URINALYSIS AUTO W/O SCOPE: CPT

## 2019-08-05 PROCEDURE — 84154 ASSAY OF PSA FREE: CPT

## 2019-08-05 PROCEDURE — 36415 COLL VENOUS BLD VENIPUNCTURE: CPT

## 2019-08-05 PROCEDURE — 84153 ASSAY OF PSA TOTAL: CPT

## 2019-08-12 ENCOUNTER — OFFICE VISIT (OUTPATIENT)
Dept: SURGERY | Facility: CLINIC | Age: 69
End: 2019-08-12
Payer: MEDICARE

## 2019-08-12 VITALS
HEART RATE: 81 BPM | BODY MASS INDEX: 32.93 KG/M2 | SYSTOLIC BLOOD PRESSURE: 108 MMHG | RESPIRATION RATE: 16 BRPM | WEIGHT: 230 LBS | HEIGHT: 70 IN | TEMPERATURE: 98 F | DIASTOLIC BLOOD PRESSURE: 74 MMHG

## 2019-08-12 DIAGNOSIS — R35.1 NOCTURIA: ICD-10-CM

## 2019-08-12 DIAGNOSIS — N52.01 ERECTILE DYSFUNCTION DUE TO ARTERIAL INSUFFICIENCY: ICD-10-CM

## 2019-08-12 DIAGNOSIS — N40.2 PROSTATE NODULE: ICD-10-CM

## 2019-08-12 DIAGNOSIS — N40.1 BENIGN PROSTATIC HYPERPLASIA WITH NOCTURIA: ICD-10-CM

## 2019-08-12 DIAGNOSIS — Z79.899 LONG TERM CURRENT USE OF TICAGRELOR THERAPY: ICD-10-CM

## 2019-08-12 DIAGNOSIS — R35.1 BENIGN PROSTATIC HYPERPLASIA WITH NOCTURIA: ICD-10-CM

## 2019-08-12 DIAGNOSIS — R97.20 ELEVATED PSA: Primary | ICD-10-CM

## 2019-08-12 PROCEDURE — 99214 OFFICE O/P EST MOD 30 MIN: CPT | Performed by: UROLOGY

## 2019-08-12 PROCEDURE — G0463 HOSPITAL OUTPT CLINIC VISIT: HCPCS | Performed by: UROLOGY

## 2019-08-12 NOTE — PATIENT INSTRUCTIONS
Berhane Engel M.D.      1.  Visit mid February or second half of February 2020. Please get blood draw for PSA--total and free and urinalysis urine test 1--7 days before visit with me.   No sexual stimulation for 5 days befor

## 2019-08-12 NOTE — PROGRESS NOTES
HPI:    Patient ID: Sandeep Martell is a 68year old male. HPI  Elevated PSA  Problem started 2/7/2019.  Patient denies associated symptoms to suggest prostate cancer such as weight loss or loss of appetite or bone pain and denies associated symptoms to s right to mid base 1.5 cm nodule raised; AUA symptom score continues at a level of 0;  4 different transrectal US and prostate biopsies: 1999, 2000, 2004; when PSA = 4.1, US showed prostate 24 g 12 biopsies benign nodule finally October 2015 PSA had all john nervous/anxious. Current Outpatient Medications:  triamcinolone acetonide (KENALOG) 10 MG/ML Injection Suspension Inject as directed.  Disp:  Rfl:    hyaluronan (MONOVISC) 88 MG/4ML Intra-articular Solution Prefilled Syringe Inject into the artic at [facility] ENDOSCOPY   • OTHER SURGICAL HISTORY  2010    Per NG: UP3   • OTHER SURGICAL HISTORY  2010    Per NG: T &A   • OTHER SURGICAL HISTORY  2010    Per NG: septo; turb. red; smr of turbs.    • PART REMOVAL COLON W END COLOSTOMY     • TONSILLECTOMY     • U °C)   TempSrc: Oral   Weight: 230 lb (104.3 kg)   Height: 5' 10\" (1.778 m)         Body mass index is 33 kg/m².     LABORATORIES  8/5/19 PSA = 4.29, 12% free PSA (38% probability of prostate cancer); UA blood = negative, microscopic not indicated  2/7/19 P likely to be present); there is a morphologically suspicious poorly defined lesion at the transition zone of the right anterior prostatic base; there is an exophytic nodule at the right posterolateral aspect of the prostatic apex; this does not have featur explained, and answered questions on treatment options and patient understood and chooses to continue observation.    (R35.1) Nocturia  Current AUA score is 1, mild voiding dysfunction.  Patient is not bothered by this problem and feels this problem is stab

## 2019-08-20 ENCOUNTER — OFFICE VISIT (OUTPATIENT)
Dept: PODIATRY CLINIC | Facility: CLINIC | Age: 69
End: 2019-08-20
Payer: MEDICARE

## 2019-08-20 DIAGNOSIS — M72.2 PLANTAR FASCIITIS OF LEFT FOOT: Primary | ICD-10-CM

## 2019-08-20 PROCEDURE — G0463 HOSPITAL OUTPT CLINIC VISIT: HCPCS | Performed by: PODIATRIST

## 2019-08-20 PROCEDURE — 99203 OFFICE O/P NEW LOW 30 MIN: CPT | Performed by: PODIATRIST

## 2019-08-20 NOTE — PROGRESS NOTES
HPI:    Patient ID: Aye Farrell is a 68year old male. This pleasant 49-year-old male presents as a new patient to me and states he is referred by his wife. He states that he is been having left arch pain for the last month or so.   His wife went and p tablet Rfl: 0     Allergies:No Known Allergies   PHYSICAL EXAM:     On physical exam pedal pulses are normal.  There is no clinical evidence of edema nor erythema. There is no apparent neurologic deficit no evidence of weakness.   Pain is in the region of

## 2019-10-08 DIAGNOSIS — E78.2 MIXED HYPERLIPIDEMIA: ICD-10-CM

## 2019-10-09 RX ORDER — ATORVASTATIN CALCIUM 20 MG/1
TABLET, FILM COATED ORAL
Qty: 90 TABLET | Refills: 1 | Status: SHIPPED | OUTPATIENT
Start: 2019-10-09 | End: 2020-04-07

## 2019-10-10 NOTE — TELEPHONE ENCOUNTER
Refill passed per [facility] protocol.   Cholesterol Medications  Protocol Criteria:  · Appointment scheduled in the past 12 months or in the next 3 months  · ALT & LDL on file in the past 12 months  · ALT result < 80  · LDL result <130   Recent Outpat

## 2019-10-22 RX ORDER — METOPROLOL SUCCINATE 50 MG/1
TABLET, EXTENDED RELEASE ORAL
Qty: 30 TABLET | Refills: 0 | Status: SHIPPED | OUTPATIENT
Start: 2019-10-22 | End: 2020-03-17

## 2019-11-20 ENCOUNTER — MED REC SCAN ONLY (OUTPATIENT)
Dept: INTERNAL MEDICINE CLINIC | Facility: CLINIC | Age: 69
End: 2019-11-20

## 2019-12-08 DIAGNOSIS — R73.03 PREDIABETES: ICD-10-CM

## 2019-12-08 DIAGNOSIS — E78.2 MIXED HYPERLIPIDEMIA: ICD-10-CM

## 2019-12-09 RX ORDER — FENOFIBRATE 160 MG/1
TABLET ORAL
Qty: 90 TABLET | Refills: 1 | Status: SHIPPED | OUTPATIENT
Start: 2019-12-09 | End: 2020-06-17

## 2019-12-09 RX ORDER — METFORMIN HYDROCHLORIDE 500 MG/1
TABLET, EXTENDED RELEASE ORAL
Qty: 90 TABLET | Refills: 1 | Status: SHIPPED | OUTPATIENT
Start: 2019-12-09 | End: 2019-12-16

## 2019-12-10 ENCOUNTER — LAB ENCOUNTER (OUTPATIENT)
Dept: LAB | Facility: HOSPITAL | Age: 69
End: 2019-12-10
Attending: INTERNAL MEDICINE
Payer: MEDICARE

## 2019-12-10 DIAGNOSIS — R73.09 ELEVATED GLUCOSE: ICD-10-CM

## 2019-12-10 DIAGNOSIS — E78.2 MIXED HYPERLIPIDEMIA: ICD-10-CM

## 2019-12-10 DIAGNOSIS — R73.03 PREDIABETES: ICD-10-CM

## 2019-12-10 PROCEDURE — 82043 UR ALBUMIN QUANTITATIVE: CPT

## 2019-12-10 PROCEDURE — 36415 COLL VENOUS BLD VENIPUNCTURE: CPT

## 2019-12-10 PROCEDURE — 85025 COMPLETE CBC W/AUTO DIFF WBC: CPT

## 2019-12-10 PROCEDURE — 82570 ASSAY OF URINE CREATININE: CPT

## 2019-12-10 PROCEDURE — 83036 HEMOGLOBIN GLYCOSYLATED A1C: CPT

## 2019-12-10 PROCEDURE — 80053 COMPREHEN METABOLIC PANEL: CPT

## 2019-12-10 PROCEDURE — 84443 ASSAY THYROID STIM HORMONE: CPT

## 2019-12-11 ENCOUNTER — OFFICE VISIT (OUTPATIENT)
Dept: FAMILY MEDICINE CLINIC | Facility: CLINIC | Age: 69
End: 2019-12-11
Payer: MEDICARE

## 2019-12-11 VITALS
TEMPERATURE: 97 F | OXYGEN SATURATION: 98 % | HEART RATE: 60 BPM | DIASTOLIC BLOOD PRESSURE: 80 MMHG | SYSTOLIC BLOOD PRESSURE: 151 MMHG | HEIGHT: 70 IN | WEIGHT: 235.38 LBS | RESPIRATION RATE: 16 BRPM | BODY MASS INDEX: 33.7 KG/M2

## 2019-12-11 DIAGNOSIS — J06.9 VIRAL UPPER RESPIRATORY TRACT INFECTION: Primary | ICD-10-CM

## 2019-12-11 PROCEDURE — 99212 OFFICE O/P EST SF 10 MIN: CPT | Performed by: NURSE PRACTITIONER

## 2019-12-11 NOTE — PROGRESS NOTES
CHIEF COMPLAINT:   Patient presents with:  Sore Throat: sore throat, chest congestion, and runny nose x2d. Pt states has been taking mucinex otc. HPI:   Edmar Miur is a 68year old male who presents for upper respiratory symptoms for  2 days.  Laretta Runner • Other and unspecified hyperlipidemia    • Peptic ulcer disease    • Sleep apnea       Past Surgical History:   Procedure Laterality Date   • COLONOSCOPY     • COLONOSCOPY     • COLONOSCOPY N/A 4/11/2018    Performed by Edwin Mancia MD at [facility] THROAT: Oral mucosa pink, moist. Posterior pharynx is pink and clear. No injection, lesions, or exudates. NECK: Supple, non-tender  LUNGS: clear to auscultation bilaterally. Breathing is non labored.   CARDIO: RRR without murmur  LYMPH:  No cervical lymph · Don't smoke. If you need help stopping, talk with your healthcare provider. · Avoid being exposed to cigarette smoke (yours or others’).   · You may use acetaminophen or ibuprofen to control pain and fever, unless another medicine was prescribed. If you © 4851-2603 [employer] [address]. [address] [facility], [address] [place]. All rights reserved. This information is not intended as a substitute for professional medical care. Always follow your healthcare professional's instructions.     Mucinex as

## 2019-12-13 ENCOUNTER — TELEPHONE (OUTPATIENT)
Dept: CARDIOLOGY | Age: 69
End: 2019-12-13

## 2019-12-16 ENCOUNTER — OFFICE VISIT (OUTPATIENT)
Dept: INTERNAL MEDICINE CLINIC | Facility: CLINIC | Age: 69
End: 2019-12-16
Payer: MEDICARE

## 2019-12-16 VITALS
DIASTOLIC BLOOD PRESSURE: 76 MMHG | BODY MASS INDEX: 41.38 KG/M2 | WEIGHT: 236.5 LBS | HEART RATE: 60 BPM | TEMPERATURE: 98 F | HEIGHT: 63.5 IN | RESPIRATION RATE: 16 BRPM | SYSTOLIC BLOOD PRESSURE: 142 MMHG

## 2019-12-16 DIAGNOSIS — J06.9 UPPER RESPIRATORY TRACT INFECTION, UNSPECIFIED TYPE: ICD-10-CM

## 2019-12-16 DIAGNOSIS — I25.9 CHRONIC ISCHEMIC HEART DISEASE: ICD-10-CM

## 2019-12-16 DIAGNOSIS — Z98.61 HISTORY OF PERCUTANEOUS CORONARY INTERVENTION: ICD-10-CM

## 2019-12-16 DIAGNOSIS — E11.9 TYPE 2 DIABETES MELLITUS WITHOUT COMPLICATION, WITHOUT LONG-TERM CURRENT USE OF INSULIN (HCC): ICD-10-CM

## 2019-12-16 DIAGNOSIS — E78.2 MIXED HYPERLIPIDEMIA: ICD-10-CM

## 2019-12-16 DIAGNOSIS — M19.90 OSTEOARTHRITIS, UNSPECIFIED OSTEOARTHRITIS TYPE, UNSPECIFIED SITE: ICD-10-CM

## 2019-12-16 DIAGNOSIS — Z00.00 MEDICARE ANNUAL WELLNESS VISIT, SUBSEQUENT: Primary | ICD-10-CM

## 2019-12-16 PROBLEM — R73.03 PREDIABETES: Status: RESOLVED | Noted: 2019-07-01 | Resolved: 2019-12-16

## 2019-12-16 PROBLEM — R93.1 ABNORMAL HEART SCORE CT: Status: RESOLVED | Noted: 2018-02-28 | Resolved: 2019-12-16

## 2019-12-16 PROCEDURE — 99214 OFFICE O/P EST MOD 30 MIN: CPT | Performed by: INTERNAL MEDICINE

## 2019-12-16 PROCEDURE — G0439 PPPS, SUBSEQ VISIT: HCPCS | Performed by: INTERNAL MEDICINE

## 2019-12-16 PROCEDURE — G0463 HOSPITAL OUTPT CLINIC VISIT: HCPCS | Performed by: INTERNAL MEDICINE

## 2019-12-16 RX ORDER — METFORMIN HYDROCHLORIDE 500 MG/1
TABLET, EXTENDED RELEASE ORAL
Qty: 180 TABLET | Refills: 1 | Status: SHIPPED | OUTPATIENT
Start: 2019-12-16 | End: 2020-04-27

## 2019-12-16 NOTE — PROGRESS NOTES
HPI:   Barber Wright is a 68year old male who presents for a Medicare Subsequent Annual Wellness visit (Pt already had Initial Annual Wellness). Pt c/o URI for a week now.   He went to urgent care and was told to take Mucinex, but he still has symptoms Directive: He has a Living Will on file in [place]. He has a Power of  for [employer] on file in [place]. He smoked tobacco in the past but quit greater than 12 months ago.   Social History    Tobacco Use      Smoking status: Former Smoker Component Value Date    WBC 9.5 12/10/2019    HGB 14.3 12/10/2019    .0 12/10/2019        ALLERGIES:   He has No Known Allergies.     CURRENT MEDICATIONS:   metFORMIN HCl  MG Oral Tablet 24 Hr, TAKE 2 TABLETS BY MOUTH DAILY WITH BREAKFAST  FE smoking about 2 years ago. His smoking use included cigars. He has a 20.00 pack-year smoking history. He has never used smokeless tobacco. He reports current alcohol use. He reports that he does not use drugs.      REVIEW OF SYSTEMS:   Review of Systems   C I have trouble hearing conversations in a noisy background such as a crowded room or restaurant:  Yes   I get confused about where sounds come from:  Yes I misunderstand some words in a sentence and need to ask people to repeat themselves:  Yes   I junito testis shows no mass, no swelling and no tenderness. Musculoskeletal: Normal range of motion. Right foot: There is deformity (pes planus). There is normal range of motion and no right foot bunion. Left foot: There is deformity (pes planus). complication, without long-term current use of insulin ([facility] [address]     Counseled pt about the diagnosis of diabetes. The patient does not have neuropathy, nephropathy or known retinopathy. Advised pt the importance of diet and exercise.   Will increase the metf (mg/dL)   Date Value   12/10/2019 125 (H)          Cardiovascular Disease Screening     LDL Annually LDL Cholesterol (mg/dL)   Date Value   06/21/2019 71        EKG - w/ Initial Preventative Physical Exam only, or if medically necessary Electrocardiogram d

## 2019-12-16 NOTE — PATIENT INSTRUCTIONS
Prince theodore Rosas's SCREENING SCHEDULE   Tests on this list are recommended by your physician but may not be covered, or covered at this frequency, by your insurer. Please check with your insurance carrier before scheduling to verify coverage.     PREVENTATI 10 years- more often if abnormal Colonoscopy due on 04/11/2028 Update [facility] if applicable    Flex Sigmoidoscopy Screen  Covered every 5 years No results found for this or any previous visit. No flowsheet data found.      Fecal Occult Blood   Co for this or any previous visit. This may be covered with your prescription benefits, but Medicare does not cover unless Medically needed    Zoster (Not covered by Medicare Part B) No orders found for this or any previous visit.  This may be covered with you

## 2019-12-16 NOTE — ASSESSMENT & PLAN NOTE
Counseled pt about the diagnosis of diabetes. The patient does not have neuropathy, nephropathy or known retinopathy. Advised pt the importance of diet and exercise. Will increase the metformin to 1000 mg daily.   Discussed diabetic education program.  F

## 2019-12-16 NOTE — ASSESSMENT & PLAN NOTE
Unremarkable exam.  Labs were reviewed  Pt is up-to-date on colonoscopy. Immunizations were reviewed. Fall risk assessment was negative. Hearing assessment was abnormal.  Pt wears hearing aids.

## 2019-12-18 ENCOUNTER — HOSPITAL ENCOUNTER (OUTPATIENT)
Dept: ENDOCRINOLOGY | Facility: HOSPITAL | Age: 69
Discharge: HOME | End: 2019-12-18
Attending: INTERNAL MEDICINE
Payer: MEDICARE

## 2019-12-18 VITALS — BODY MASS INDEX: 41 KG/M2 | WEIGHT: 236.69 LBS

## 2019-12-18 DIAGNOSIS — E11.9 TYPE 2 DIABETES MELLITUS WITHOUT COMPLICATION, WITHOUT LONG-TERM CURRENT USE OF INSULIN (HCC): ICD-10-CM

## 2019-12-18 DIAGNOSIS — E11.9 TYPE 2 DIABETES MELLITUS WITHOUT COMPLICATION, WITH LONG-TERM CURRENT USE OF INSULIN (HCC): ICD-10-CM

## 2019-12-18 DIAGNOSIS — Z79.4 TYPE 2 DIABETES MELLITUS WITHOUT COMPLICATION, WITH LONG-TERM CURRENT USE OF INSULIN (HCC): ICD-10-CM

## 2019-12-18 NOTE — PROGRESS NOTES
Moncho Weber  : 3/29/1950 attended individual initial assessment for Diabetes Education:    Date: 2019   Start time: 2p End time: 3p    HgbA1C (%)   Date Value   12/10/2019 6.7 (H)        Assessment:     Patient presents with spouse Rey Bowen for Solving: Prevention, detection and treatment of acute complications: taught symptoms of hypoglycemia, hyperglycemia, how to treat low blood sugar (Rule of 15) and actions for lowering high blood glucose levels.  Initiated goal setting process and will el

## 2019-12-23 ENCOUNTER — E-ADVICE (OUTPATIENT)
Dept: CARDIOLOGY | Age: 69
End: 2019-12-23

## 2019-12-30 ENCOUNTER — TELEPHONE (OUTPATIENT)
Dept: CARDIOLOGY | Age: 69
End: 2019-12-30

## 2020-01-27 RX ORDER — TICAGRELOR 90 MG/1
TABLET ORAL
Qty: 30 TABLET | Refills: 0 | Status: SHIPPED | OUTPATIENT
Start: 2020-01-27 | End: 2020-02-21 | Stop reason: ALTCHOICE

## 2020-01-31 ENCOUNTER — LAB ENCOUNTER (OUTPATIENT)
Dept: LAB | Facility: HOSPITAL | Age: 70
End: 2020-01-31
Attending: INTERNAL MEDICINE
Payer: MEDICARE

## 2020-01-31 DIAGNOSIS — R73.03 PREDIABETES: ICD-10-CM

## 2020-01-31 DIAGNOSIS — R97.20 ELEVATED PSA: ICD-10-CM

## 2020-01-31 DIAGNOSIS — E11.9 TYPE 2 DIABETES MELLITUS WITHOUT COMPLICATION, WITHOUT LONG-TERM CURRENT USE OF INSULIN (HCC): ICD-10-CM

## 2020-01-31 DIAGNOSIS — E78.49 OTHER HYPERLIPIDEMIA: Primary | ICD-10-CM

## 2020-01-31 DIAGNOSIS — R35.1 NOCTURIA: ICD-10-CM

## 2020-01-31 LAB
ALT SERPL-CCNC: 39 U/L
ALT SERPL-CCNC: 39 U/L (ref 16–61)
AST SERPL-CCNC: 12 U/L
AST SERPL-CCNC: 12 U/L (ref 15–37)
BILIRUB UR QL: NEGATIVE
CHOLEST SERPL-MCNC: 143 MG/DL
CHOLEST SMN-MCNC: 143 MG/DL (ref ?–200)
CHOLEST/HDLC SERPL: NORMAL {RATIO}
CLARITY UR: CLEAR
COLOR UR: YELLOW
EST. AVERAGE GLUCOSE BLD GHB EST-MCNC: 134 MG/DL (ref 68–126)
GLUCOSE UR-MCNC: NEGATIVE MG/DL
HBA1C MFR BLD HPLC: 6.3 % (ref ?–5.7)
HDLC SERPL-MCNC: 40 MG/DL
HDLC SERPL-MCNC: 40 MG/DL (ref 40–59)
HGB UR QL STRIP.AUTO: NEGATIVE
KETONES UR-MCNC: NEGATIVE MG/DL
LDLC SERPL CALC-MCNC: 79 MG/DL
LDLC SERPL CALC-MCNC: 79 MG/DL (ref ?–100)
LENGTH OF FAST TIME PATIENT: NORMAL H
LEUKOCYTE ESTERASE UR QL STRIP.AUTO: NEGATIVE
NITRITE UR QL STRIP.AUTO: NEGATIVE
NONHDLC SERPL-MCNC: 103 MG/DL (ref ?–130)
NONHDLC SERPL-MCNC: NORMAL MG/DL
PATIENT FASTING Y/N/NP: YES
PH UR: 6 [PH] (ref 5–8)
PROT UR-MCNC: NEGATIVE MG/DL
PSA FREE MFR SERPL: 8 %
PSA FREE SERPL-MCNC: 0.52 NG/ML
PSA SERPL-MCNC: 6.13 NG/ML (ref ?–4)
SP GR UR STRIP: 1.02 (ref 1–1.03)
TRIGL SERPL-MCNC: 122 MG/DL
TRIGL SERPL-MCNC: 122 MG/DL (ref 30–149)
UROBILINOGEN UR STRIP-ACNC: <2
VLDLC SERPL CALC-MCNC: 24 MG/DL (ref 0–30)
VLDLC SERPL CALC-MCNC: NORMAL MG/DL

## 2020-01-31 PROCEDURE — 81003 URINALYSIS AUTO W/O SCOPE: CPT

## 2020-01-31 PROCEDURE — 80061 LIPID PANEL: CPT

## 2020-01-31 PROCEDURE — 84460 ALANINE AMINO (ALT) (SGPT): CPT

## 2020-01-31 PROCEDURE — 36415 COLL VENOUS BLD VENIPUNCTURE: CPT

## 2020-01-31 PROCEDURE — 84153 ASSAY OF PSA TOTAL: CPT

## 2020-01-31 PROCEDURE — 83036 HEMOGLOBIN GLYCOSYLATED A1C: CPT

## 2020-01-31 PROCEDURE — 84450 TRANSFERASE (AST) (SGOT): CPT

## 2020-01-31 PROCEDURE — 84154 ASSAY OF PSA FREE: CPT

## 2020-02-03 ENCOUNTER — TELEPHONE (OUTPATIENT)
Dept: CARDIOLOGY | Age: 70
End: 2020-02-03

## 2020-02-03 ENCOUNTER — CLINICAL ABSTRACT (OUTPATIENT)
Dept: CARDIOLOGY | Age: 70
End: 2020-02-03

## 2020-02-05 ENCOUNTER — HOSPITAL ENCOUNTER (OUTPATIENT)
Dept: ENDOCRINOLOGY | Facility: HOSPITAL | Age: 70
Discharge: HOME | End: 2020-02-05
Attending: INTERNAL MEDICINE
Payer: MEDICARE

## 2020-02-05 VITALS — WEIGHT: 233.81 LBS | BODY MASS INDEX: 41 KG/M2

## 2020-02-05 DIAGNOSIS — E11.9 TYPE 2 DIABETES MELLITUS WITHOUT COMPLICATION, WITHOUT LONG-TERM CURRENT USE OF INSULIN (HCC): Primary | ICD-10-CM

## 2020-02-06 NOTE — PROGRESS NOTES
Dave Farley  [id number] attended Step 2 Group Diabetes Education Class:    Date: 2/5/2020  Start time: 18  End time: 2000    Wt: 233 lb 12.8 oz Weight change since start of program: 3# down    Diabetes Overview, pathophysiology, pre-diabetes, A1C re

## 2020-02-12 ENCOUNTER — HOSPITAL ENCOUNTER (OUTPATIENT)
Dept: ENDOCRINOLOGY | Facility: HOSPITAL | Age: 70
Discharge: HOME | End: 2020-02-12
Attending: INTERNAL MEDICINE
Payer: MEDICARE

## 2020-02-12 VITALS — BODY MASS INDEX: 42 KG/M2 | WEIGHT: 238.63 LBS

## 2020-02-12 DIAGNOSIS — E11.9 TYPE 2 DIABETES MELLITUS WITHOUT COMPLICATION, WITHOUT LONG-TERM CURRENT USE OF INSULIN (HCC): Primary | ICD-10-CM

## 2020-02-13 ENCOUNTER — TELEPHONE (OUTPATIENT)
Dept: INTERNAL MEDICINE CLINIC | Facility: CLINIC | Age: 70
End: 2020-02-13

## 2020-02-13 NOTE — TELEPHONE ENCOUNTER
Talked to patient appointment made with Dr Nishant Hernandez already. FYI: Dr Mariann Green [facility]#[phone].

## 2020-02-13 NOTE — PROGRESS NOTES
Nate Persaud  [id number] attended Step 3 Group Diabetes Education Class:    Date: 2/12/2020  Start time: 6P End time: 8P    Wt: 238 lb 9.6 oz     Not checking sugars at home, but is regularly logging foods in sosa on his phone.      Healthy Eating  [place]

## 2020-02-13 NOTE — TELEPHONE ENCOUNTER
Please advise morris below. OK to schedule patient for a pre op exam next week on a reserve 24 slot.

## 2020-02-13 NOTE — TELEPHONE ENCOUNTER
Please call Dr. Ana Diaz office and ask them to call Dr. Brissa Marcano office for cardiac clearance for the patient's upcoming hip surgery. Patient is under the care of Dr. Sadaf Méndez for his coronary disease.   Then please call the patient and schedule him for

## 2020-02-13 NOTE — TELEPHONE ENCOUNTER
Hema Barragan called back informed of Dr. Yaquelin Castellon message. Hema Barragan did inform Dr. Villavicencio [facility] office on 2/10 for cardiac clearance.

## 2020-02-18 ENCOUNTER — HOSPITAL ENCOUNTER (OUTPATIENT)
Dept: GENERAL RADIOLOGY | Facility: HOSPITAL | Age: 70
Discharge: HOME | End: 2020-02-18
Attending: INTERNAL MEDICINE
Payer: MEDICARE

## 2020-02-18 ENCOUNTER — TELEPHONE (OUTPATIENT)
Dept: CARDIOLOGY | Age: 70
End: 2020-02-18

## 2020-02-18 ENCOUNTER — OFFICE VISIT (OUTPATIENT)
Dept: INTERNAL MEDICINE CLINIC | Facility: CLINIC | Age: 70
End: 2020-02-18
Payer: MEDICARE

## 2020-02-18 ENCOUNTER — LAB ENCOUNTER (OUTPATIENT)
Dept: LAB | Facility: HOSPITAL | Age: 70
End: 2020-02-18
Attending: ORTHOPAEDIC SURGERY
Payer: MEDICARE

## 2020-02-18 VITALS
DIASTOLIC BLOOD PRESSURE: 82 MMHG | HEIGHT: 69 IN | WEIGHT: 237.19 LBS | SYSTOLIC BLOOD PRESSURE: 132 MMHG | BODY MASS INDEX: 35.13 KG/M2 | HEART RATE: 59 BPM

## 2020-02-18 DIAGNOSIS — G47.33 OBSTRUCTIVE SLEEP APNEA ON CPAP: ICD-10-CM

## 2020-02-18 DIAGNOSIS — I25.9 CHRONIC ISCHEMIC HEART DISEASE: ICD-10-CM

## 2020-02-18 DIAGNOSIS — E11.9 TYPE 2 DIABETES MELLITUS WITHOUT COMPLICATION, WITHOUT LONG-TERM CURRENT USE OF INSULIN (HCC): ICD-10-CM

## 2020-02-18 DIAGNOSIS — M17.11 OSTEOARTHRITIS OF RIGHT KNEE, UNSPECIFIED OSTEOARTHRITIS TYPE: ICD-10-CM

## 2020-02-18 DIAGNOSIS — Z01.818 PRE-OP EXAM: Primary | ICD-10-CM

## 2020-02-18 DIAGNOSIS — Z01.818 PREOP TESTING: ICD-10-CM

## 2020-02-18 DIAGNOSIS — Z99.89 OBSTRUCTIVE SLEEP APNEA ON CPAP: ICD-10-CM

## 2020-02-18 PROBLEM — J06.9 UPPER RESPIRATORY TRACT INFECTION: Status: RESOLVED | Noted: 2019-12-16 | Resolved: 2020-02-18

## 2020-02-18 PROBLEM — Z00.00 MEDICARE ANNUAL WELLNESS VISIT, SUBSEQUENT: Status: RESOLVED | Noted: 2018-12-12 | Resolved: 2020-02-18

## 2020-02-18 LAB
ANTIBODY SCREEN: NEGATIVE
RH BLOOD TYPE: POSITIVE

## 2020-02-18 PROCEDURE — 87641 MR-STAPH DNA AMP PROBE: CPT

## 2020-02-18 PROCEDURE — 86901 BLOOD TYPING SEROLOGIC RH(D): CPT

## 2020-02-18 PROCEDURE — 71046 X-RAY EXAM CHEST 2 VIEWS: CPT | Performed by: INTERNAL MEDICINE

## 2020-02-18 PROCEDURE — 99215 OFFICE O/P EST HI 40 MIN: CPT | Performed by: INTERNAL MEDICINE

## 2020-02-18 PROCEDURE — 36415 COLL VENOUS BLD VENIPUNCTURE: CPT

## 2020-02-18 PROCEDURE — 86850 RBC ANTIBODY SCREEN: CPT

## 2020-02-18 PROCEDURE — G0463 HOSPITAL OUTPT CLINIC VISIT: HCPCS | Performed by: INTERNAL MEDICINE

## 2020-02-18 PROCEDURE — 86900 BLOOD TYPING SEROLOGIC ABO: CPT

## 2020-02-18 NOTE — PROGRESS NOTES
HPI:    Patient ID: Brigitte Ramires is a 68year old male. Pt is here for pre-op exam for a total right knee replacement on 2/27/2020 with Dr. Misha Morris at [facility].    Pt will see Dr. Megan aRlph for cardiac clearance on Friday.     Knee Pain    The pain Jessy Delatorre MD at [facility] ENDOSCOPY   • OTHER SURGICAL HISTORY  2010    Per NG: UP3   • OTHER SURGICAL HISTORY  2010    Per NG: T &A   • OTHER SURGICAL HISTORY  2010    Per NG: septo; turb. red; smr of turbs.    • TONSILLECTOMY     • UPPER GI ENDOSCOPY,BIOPSY occasionally: Elena    Drug use: No    Family History   Problem Relation Age of Onset   • Alcohol and Other Disorders Associated Father    • Obesity Father    • Heart Attack Father 50        Per NG: massive heart attack ( cause of death)   • Cancer Mother ASSESSMENT/PLAN:     1. Pre-op exam  2. Osteoarthritis of right knee, unspecified osteoarthritis type  Pre-op exam, CXR, and labs unremarkable. Dr. Saray Hinton will clear pt from cardiology. Pt is clear for surgery from non-cardiac standpoint.     3. Type

## 2020-02-19 ENCOUNTER — MED REC SCAN ONLY (OUTPATIENT)
Dept: INTERNAL MEDICINE CLINIC | Facility: CLINIC | Age: 70
End: 2020-02-19

## 2020-02-19 ENCOUNTER — TELEPHONE (OUTPATIENT)
Dept: INTERNAL MEDICINE CLINIC | Facility: CLINIC | Age: 70
End: 2020-02-19

## 2020-02-19 ENCOUNTER — HOSPITAL ENCOUNTER (OUTPATIENT)
Dept: ENDOCRINOLOGY | Facility: HOSPITAL | Age: 70
Discharge: HOME | End: 2020-02-19
Attending: INTERNAL MEDICINE
Payer: MEDICARE

## 2020-02-19 VITALS — WEIGHT: 235.38 LBS | BODY MASS INDEX: 35 KG/M2

## 2020-02-19 DIAGNOSIS — E11.9 TYPE 2 DIABETES MELLITUS WITHOUT COMPLICATION, WITH LONG-TERM CURRENT USE OF INSULIN (HCC): Primary | ICD-10-CM

## 2020-02-19 DIAGNOSIS — Z79.4 TYPE 2 DIABETES MELLITUS WITHOUT COMPLICATION, WITH LONG-TERM CURRENT USE OF INSULIN (HCC): Primary | ICD-10-CM

## 2020-02-19 LAB — MRSA DNA SPEC QL NAA+PROBE: NEGATIVE

## 2020-02-20 RX ORDER — METFORMIN HYDROCHLORIDE 500 MG/1
500 TABLET, EXTENDED RELEASE ORAL DAILY
Refills: 1 | COMMUNITY
Start: 2020-02-02

## 2020-02-20 NOTE — PROGRESS NOTES
Lin Morrison  [id number] attended Step 4 Group Diabetes Education Class:     Date: 2/19/2020  Start time: 6:00 pm End time: 8:00 pm    Wt: 235 lb 6.4 oz Weight change since start of program: -1.3 lbs    Blood Glucose:  Juan Malone is not monitoring his bloo

## 2020-02-21 ENCOUNTER — OFFICE VISIT (OUTPATIENT)
Dept: CARDIOLOGY | Age: 70
End: 2020-02-21

## 2020-02-21 VITALS
WEIGHT: 238 LBS | DIASTOLIC BLOOD PRESSURE: 84 MMHG | SYSTOLIC BLOOD PRESSURE: 146 MMHG | HEIGHT: 69 IN | HEART RATE: 68 BPM | BODY MASS INDEX: 35.25 KG/M2 | RESPIRATION RATE: 18 BRPM | OXYGEN SATURATION: 97 %

## 2020-02-21 DIAGNOSIS — I10 ESSENTIAL HYPERTENSION: Primary | ICD-10-CM

## 2020-02-21 DIAGNOSIS — E78.49 OTHER HYPERLIPIDEMIA: ICD-10-CM

## 2020-02-21 DIAGNOSIS — I25.10 CORONARY ARTERY DISEASE INVOLVING NATIVE CORONARY ARTERY OF NATIVE HEART WITHOUT ANGINA PECTORIS: ICD-10-CM

## 2020-02-21 DIAGNOSIS — Z01.810 PREOP CARDIOVASCULAR EXAM: ICD-10-CM

## 2020-02-21 PROCEDURE — 99214 OFFICE O/P EST MOD 30 MIN: CPT | Performed by: INTERNAL MEDICINE

## 2020-02-21 PROCEDURE — 93000 ELECTROCARDIOGRAM COMPLETE: CPT | Performed by: INTERNAL MEDICINE

## 2020-02-21 RX ORDER — CLOPIDOGREL BISULFATE 75 MG/1
75 TABLET ORAL DAILY
Qty: 90 TABLET | Refills: 1 | Status: SHIPPED | OUTPATIENT
Start: 2020-02-21 | End: 2020-08-24

## 2020-02-21 ASSESSMENT — PATIENT HEALTH QUESTIONNAIRE - PHQ9
2. FEELING DOWN, DEPRESSED OR HOPELESS: NOT AT ALL
SUM OF ALL RESPONSES TO PHQ9 QUESTIONS 1 AND 2: 0
SUM OF ALL RESPONSES TO PHQ9 QUESTIONS 1 AND 2: 0
1. LITTLE INTEREST OR PLEASURE IN DOING THINGS: NOT AT ALL

## 2020-02-24 DIAGNOSIS — Z01.810 PREOP CARDIOVASCULAR EXAM: ICD-10-CM

## 2020-02-27 ENCOUNTER — ANESTHESIA EVENT (OUTPATIENT)
Dept: SURGERY | Facility: HOSPITAL | Age: 70
DRG: 470 | End: 2020-02-27
Payer: MEDICARE

## 2020-02-27 ENCOUNTER — HOSPITAL ENCOUNTER (INPATIENT)
Facility: HOSPITAL | Age: 70
LOS: 3 days | Discharge: HOME HEALTH | DRG: 470 | End: 2020-03-01
Attending: ORTHOPAEDIC SURGERY | Admitting: ORTHOPAEDIC SURGERY
Payer: MEDICARE

## 2020-02-27 ENCOUNTER — ANESTHESIA (OUTPATIENT)
Dept: SURGERY | Facility: HOSPITAL | Age: 70
DRG: 470 | End: 2020-02-27
Payer: MEDICARE

## 2020-02-27 ENCOUNTER — APPOINTMENT (OUTPATIENT)
Dept: GENERAL RADIOLOGY | Facility: HOSPITAL | Age: 70
DRG: 470 | End: 2020-02-27
Attending: ORTHOPAEDIC SURGERY
Payer: MEDICARE

## 2020-02-27 DIAGNOSIS — Z01.818 PREOP TESTING: Primary | ICD-10-CM

## 2020-02-27 PROBLEM — I25.10 CAD (CORONARY ARTERY DISEASE): Chronic | Status: ACTIVE | Noted: 2020-02-27

## 2020-02-27 PROBLEM — I10 ESSENTIAL HYPERTENSION: Chronic | Status: ACTIVE | Noted: 2020-02-27

## 2020-02-27 LAB
GLUCOSE BLDC GLUCOMTR-MCNC: 107 MG/DL (ref 70–99)
GLUCOSE BLDC GLUCOMTR-MCNC: 113 MG/DL (ref 70–99)
GLUCOSE BLDC GLUCOMTR-MCNC: 113 MG/DL (ref 70–99)
GLUCOSE BLDC GLUCOMTR-MCNC: 124 MG/DL (ref 70–99)

## 2020-02-27 PROCEDURE — 99233 SBSQ HOSP IP/OBS HIGH 50: CPT | Performed by: HOSPITALIST

## 2020-02-27 PROCEDURE — 0SRC0J9 REPLACEMENT OF RIGHT KNEE JOINT WITH SYNTHETIC SUBSTITUTE, CEMENTED, OPEN APPROACH: ICD-10-PCS | Performed by: ORTHOPAEDIC SURGERY

## 2020-02-27 PROCEDURE — 73560 X-RAY EXAM OF KNEE 1 OR 2: CPT | Performed by: ORTHOPAEDIC SURGERY

## 2020-02-27 DEVICE — PSN FEM PS CMT CCR STD SZ8 R: Type: IMPLANTABLE DEVICE | Site: KNEE | Status: FUNCTIONAL

## 2020-02-27 DEVICE — PSN TIB STM 5 DEG SZ F R: Type: IMPLANTABLE DEVICE | Site: KNEE | Status: FUNCTIONAL

## 2020-02-27 DEVICE — PERSONA CEM FEM/CEM TIB/STD: Type: IMPLANTABLE DEVICE | Status: FUNCTIONAL

## 2020-02-27 DEVICE — PSN ASF PS 12MM PLY R 6-9EF: Type: IMPLANTABLE DEVICE | Site: KNEE | Status: FUNCTIONAL

## 2020-02-27 DEVICE — PSN ALL POLY PAT PLY 38MM: Type: IMPLANTABLE DEVICE | Site: KNEE | Status: FUNCTIONAL

## 2020-02-27 DEVICE — BIOMET BC R 1X40 US: Type: IMPLANTABLE DEVICE | Site: KNEE | Status: FUNCTIONAL

## 2020-02-27 RX ORDER — HYDROCODONE BITARTRATE AND ACETAMINOPHEN 5; 325 MG/1; MG/1
1 TABLET ORAL EVERY 4 HOURS PRN
Status: DISCONTINUED | OUTPATIENT
Start: 2020-02-27 | End: 2020-03-01

## 2020-02-27 RX ORDER — HYDROCODONE BITARTRATE AND ACETAMINOPHEN 7.5; 325 MG/1; MG/1
1 TABLET ORAL EVERY 6 HOURS PRN
Status: ACTIVE | OUTPATIENT
Start: 2020-02-27 | End: 2020-02-28

## 2020-02-27 RX ORDER — SODIUM CHLORIDE, SODIUM LACTATE, POTASSIUM CHLORIDE, CALCIUM CHLORIDE 600; 310; 30; 20 MG/100ML; MG/100ML; MG/100ML; MG/100ML
INJECTION, SOLUTION INTRAVENOUS CONTINUOUS
Status: DISCONTINUED | OUTPATIENT
Start: 2020-02-27 | End: 2020-03-01

## 2020-02-27 RX ORDER — DIPHENHYDRAMINE HYDROCHLORIDE 50 MG/ML
12.5 INJECTION INTRAMUSCULAR; INTRAVENOUS EVERY 4 HOURS PRN
Status: DISCONTINUED | OUTPATIENT
Start: 2020-02-27 | End: 2020-02-27

## 2020-02-27 RX ORDER — ASPIRIN 81 MG/1
81 TABLET ORAL DAILY
Status: DISCONTINUED | OUTPATIENT
Start: 2020-02-28 | End: 2020-03-01

## 2020-02-27 RX ORDER — CEFAZOLIN SODIUM/WATER 2 G/20 ML
2 SYRINGE (ML) INTRAVENOUS ONCE
Status: COMPLETED | OUTPATIENT
Start: 2020-02-27 | End: 2020-02-27

## 2020-02-27 RX ORDER — CYCLOBENZAPRINE HCL 5 MG
5 TABLET ORAL EVERY 8 HOURS PRN
Status: DISCONTINUED | OUTPATIENT
Start: 2020-02-27 | End: 2020-02-29

## 2020-02-27 RX ORDER — MORPHINE SULFATE 2 MG/ML
2 INJECTION, SOLUTION INTRAMUSCULAR; INTRAVENOUS EVERY 2 HOUR PRN
Status: DISCONTINUED | OUTPATIENT
Start: 2020-02-27 | End: 2020-03-01

## 2020-02-27 RX ORDER — ACETAMINOPHEN 325 MG/1
650 TABLET ORAL EVERY 6 HOURS PRN
Status: ACTIVE | OUTPATIENT
Start: 2020-02-27 | End: 2020-02-28

## 2020-02-27 RX ORDER — HYDROMORPHONE HYDROCHLORIDE 1 MG/ML
0.6 INJECTION, SOLUTION INTRAMUSCULAR; INTRAVENOUS; SUBCUTANEOUS
Status: ACTIVE | OUTPATIENT
Start: 2020-02-27 | End: 2020-02-28

## 2020-02-27 RX ORDER — POLYETHYLENE GLYCOL 3350 17 G/17G
17 POWDER, FOR SOLUTION ORAL DAILY PRN
Status: DISCONTINUED | OUTPATIENT
Start: 2020-02-27 | End: 2020-03-01

## 2020-02-27 RX ORDER — SODIUM CHLORIDE 0.9 % (FLUSH) 0.9 %
10 SYRINGE (ML) INJECTION AS NEEDED
Status: DISCONTINUED | OUTPATIENT
Start: 2020-02-27 | End: 2020-03-01

## 2020-02-27 RX ORDER — SENNOSIDES 8.6 MG
17.2 TABLET ORAL NIGHTLY
Status: DISCONTINUED | OUTPATIENT
Start: 2020-02-27 | End: 2020-03-01

## 2020-02-27 RX ORDER — METOCLOPRAMIDE HYDROCHLORIDE 5 MG/ML
10 INJECTION INTRAMUSCULAR; INTRAVENOUS EVERY 6 HOURS PRN
Status: ACTIVE | OUTPATIENT
Start: 2020-02-27 | End: 2020-02-29

## 2020-02-27 RX ORDER — CEFAZOLIN SODIUM/WATER 2 G/20 ML
2 SYRINGE (ML) INTRAVENOUS EVERY 8 HOURS
Status: COMPLETED | OUTPATIENT
Start: 2020-02-27 | End: 2020-02-28

## 2020-02-27 RX ORDER — MORPHINE SULFATE 1 MG/ML
INJECTION, SOLUTION EPIDURAL; INTRATHECAL; INTRAVENOUS
Status: COMPLETED | OUTPATIENT
Start: 2020-02-27 | End: 2020-02-27

## 2020-02-27 RX ORDER — MORPHINE SULFATE 1 MG/ML
INJECTION, SOLUTION EPIDURAL; INTRATHECAL; INTRAVENOUS AS NEEDED
Status: DISCONTINUED | OUTPATIENT
Start: 2020-02-27 | End: 2020-02-27

## 2020-02-27 RX ORDER — TRANEXAMIC ACID 10 MG/ML
INJECTION, SOLUTION INTRAVENOUS AS NEEDED
Status: DISCONTINUED | OUTPATIENT
Start: 2020-02-27 | End: 2020-02-27 | Stop reason: SURG

## 2020-02-27 RX ORDER — LIDOCAINE HYDROCHLORIDE 10 MG/ML
INJECTION, SOLUTION EPIDURAL; INFILTRATION; INTRACAUDAL; PERINEURAL AS NEEDED
Status: DISCONTINUED | OUTPATIENT
Start: 2020-02-27 | End: 2020-02-27

## 2020-02-27 RX ORDER — ATORVASTATIN CALCIUM 20 MG/1
20 TABLET, FILM COATED ORAL DAILY
Status: DISCONTINUED | OUTPATIENT
Start: 2020-02-28 | End: 2020-03-01

## 2020-02-27 RX ORDER — MIDAZOLAM HYDROCHLORIDE 1 MG/ML
INJECTION INTRAMUSCULAR; INTRAVENOUS AS NEEDED
Status: DISCONTINUED | OUTPATIENT
Start: 2020-02-27 | End: 2020-02-27 | Stop reason: SURG

## 2020-02-27 RX ORDER — ONDANSETRON 2 MG/ML
4 INJECTION INTRAMUSCULAR; INTRAVENOUS ONCE AS NEEDED
Status: ACTIVE | OUTPATIENT
Start: 2020-02-27 | End: 2020-02-27

## 2020-02-27 RX ORDER — PROCHLORPERAZINE EDISYLATE 5 MG/ML
10 INJECTION INTRAMUSCULAR; INTRAVENOUS EVERY 6 HOURS PRN
Status: ACTIVE | OUTPATIENT
Start: 2020-02-27 | End: 2020-02-29

## 2020-02-27 RX ORDER — DIPHENHYDRAMINE HYDROCHLORIDE 50 MG/ML
12.5 INJECTION INTRAMUSCULAR; INTRAVENOUS EVERY 4 HOURS PRN
Status: DISCONTINUED | OUTPATIENT
Start: 2020-02-27 | End: 2020-03-01

## 2020-02-27 RX ORDER — ACETAMINOPHEN 500 MG
1000 TABLET ORAL ONCE
Status: COMPLETED | OUTPATIENT
Start: 2020-02-27 | End: 2020-02-27

## 2020-02-27 RX ORDER — HYDROMORPHONE HYDROCHLORIDE 1 MG/ML
0.4 INJECTION, SOLUTION INTRAMUSCULAR; INTRAVENOUS; SUBCUTANEOUS
Status: DISPENSED | OUTPATIENT
Start: 2020-02-27 | End: 2020-02-28

## 2020-02-27 RX ORDER — NALOXONE HYDROCHLORIDE 0.4 MG/ML
0.08 INJECTION, SOLUTION INTRAMUSCULAR; INTRAVENOUS; SUBCUTANEOUS
Status: ACTIVE | OUTPATIENT
Start: 2020-02-27 | End: 2020-02-28

## 2020-02-27 RX ORDER — ONDANSETRON 2 MG/ML
4 INJECTION INTRAMUSCULAR; INTRAVENOUS EVERY 4 HOURS PRN
Status: DISPENSED | OUTPATIENT
Start: 2020-02-27 | End: 2020-02-29

## 2020-02-27 RX ORDER — PROCHLORPERAZINE EDISYLATE 5 MG/ML
5 INJECTION INTRAMUSCULAR; INTRAVENOUS ONCE AS NEEDED
Status: ACTIVE | OUTPATIENT
Start: 2020-02-27 | End: 2020-02-27

## 2020-02-27 RX ORDER — DIPHENHYDRAMINE HYDROCHLORIDE 50 MG/ML
25 INJECTION INTRAMUSCULAR; INTRAVENOUS ONCE AS NEEDED
Status: ACTIVE | OUTPATIENT
Start: 2020-02-27 | End: 2020-02-27

## 2020-02-27 RX ORDER — MORPHINE SULFATE 2 MG/ML
1 INJECTION, SOLUTION INTRAMUSCULAR; INTRAVENOUS EVERY 2 HOUR PRN
Status: DISCONTINUED | OUTPATIENT
Start: 2020-02-27 | End: 2020-03-01

## 2020-02-27 RX ORDER — METOPROLOL SUCCINATE 25 MG/1
25 TABLET, EXTENDED RELEASE ORAL
Status: DISCONTINUED | OUTPATIENT
Start: 2020-02-28 | End: 2020-03-01

## 2020-02-27 RX ORDER — ASPIRIN 81 MG/1
81 TABLET ORAL DAILY
Status: DISCONTINUED | OUTPATIENT
Start: 2020-02-29 | End: 2020-02-27

## 2020-02-27 RX ORDER — SODIUM PHOSPHATE, DIBASIC AND SODIUM PHOSPHATE, MONOBASIC 7; 19 G/133ML; G/133ML
1 ENEMA RECTAL ONCE AS NEEDED
Status: DISCONTINUED | OUTPATIENT
Start: 2020-02-27 | End: 2020-03-01

## 2020-02-27 RX ORDER — ACETAMINOPHEN 325 MG/1
650 TABLET ORAL EVERY 4 HOURS PRN
Status: DISCONTINUED | OUTPATIENT
Start: 2020-02-27 | End: 2020-03-01

## 2020-02-27 RX ORDER — METOCLOPRAMIDE 10 MG/1
10 TABLET ORAL ONCE
Status: COMPLETED | OUTPATIENT
Start: 2020-02-27 | End: 2020-02-27

## 2020-02-27 RX ORDER — HYDROCODONE BITARTRATE AND ACETAMINOPHEN 7.5; 325 MG/1; MG/1
2 TABLET ORAL EVERY 6 HOURS PRN
Status: DISPENSED | OUTPATIENT
Start: 2020-02-27 | End: 2020-02-28

## 2020-02-27 RX ORDER — DIPHENHYDRAMINE HCL 25 MG
25 CAPSULE ORAL EVERY 4 HOURS PRN
Status: DISCONTINUED | OUTPATIENT
Start: 2020-02-27 | End: 2020-03-01

## 2020-02-27 RX ORDER — DEXTROSE MONOHYDRATE 25 G/50ML
50 INJECTION, SOLUTION INTRAVENOUS
Status: DISCONTINUED | OUTPATIENT
Start: 2020-02-27 | End: 2020-03-01

## 2020-02-27 RX ORDER — DIPHENHYDRAMINE HCL 25 MG
25 CAPSULE ORAL EVERY 4 HOURS PRN
Status: DISCONTINUED | OUTPATIENT
Start: 2020-02-27 | End: 2020-02-27

## 2020-02-27 RX ORDER — FAMOTIDINE 20 MG/1
20 TABLET ORAL ONCE
Status: COMPLETED | OUTPATIENT
Start: 2020-02-27 | End: 2020-02-27

## 2020-02-27 RX ORDER — MORPHINE SULFATE 4 MG/ML
4 INJECTION, SOLUTION INTRAMUSCULAR; INTRAVENOUS EVERY 2 HOUR PRN
Status: DISCONTINUED | OUTPATIENT
Start: 2020-02-27 | End: 2020-03-01

## 2020-02-27 RX ORDER — BISACODYL 10 MG
10 SUPPOSITORY, RECTAL RECTAL
Status: DISCONTINUED | OUTPATIENT
Start: 2020-02-27 | End: 2020-03-01

## 2020-02-27 RX ORDER — BUPIVACAINE HYDROCHLORIDE 5 MG/ML
INJECTION, SOLUTION EPIDURAL; INTRACAUDAL
Status: COMPLETED | OUTPATIENT
Start: 2020-02-27 | End: 2020-02-27

## 2020-02-27 RX ORDER — HALOPERIDOL 5 MG/ML
0.5 INJECTION INTRAMUSCULAR ONCE AS NEEDED
Status: ACTIVE | OUTPATIENT
Start: 2020-02-27 | End: 2020-02-27

## 2020-02-27 RX ORDER — LIDOCAINE HYDROCHLORIDE 10 MG/ML
INJECTION, SOLUTION INFILTRATION; PERINEURAL
Status: COMPLETED | OUTPATIENT
Start: 2020-02-27 | End: 2020-02-27

## 2020-02-27 RX ORDER — HYDROCODONE BITARTRATE AND ACETAMINOPHEN 5; 325 MG/1; MG/1
2 TABLET ORAL EVERY 4 HOURS PRN
Status: DISCONTINUED | OUTPATIENT
Start: 2020-02-27 | End: 2020-03-01

## 2020-02-27 RX ORDER — BUPIVACAINE HYDROCHLORIDE 7.5 MG/ML
INJECTION, SOLUTION INTRASPINAL AS NEEDED
Status: DISCONTINUED | OUTPATIENT
Start: 2020-02-27 | End: 2020-02-27

## 2020-02-27 RX ORDER — DOCUSATE SODIUM 100 MG/1
100 CAPSULE, LIQUID FILLED ORAL 2 TIMES DAILY
Status: DISCONTINUED | OUTPATIENT
Start: 2020-02-27 | End: 2020-03-01

## 2020-02-27 RX ADMIN — MIDAZOLAM HYDROCHLORIDE 2 MG: 1 INJECTION INTRAMUSCULAR; INTRAVENOUS at 11:30:00

## 2020-02-27 RX ADMIN — SODIUM CHLORIDE, SODIUM LACTATE, POTASSIUM CHLORIDE, CALCIUM CHLORIDE: 600; 310; 30; 20 INJECTION, SOLUTION INTRAVENOUS at 12:51:00

## 2020-02-27 RX ADMIN — BUPIVACAINE HYDROCHLORIDE 1.7 ML: 5 INJECTION, SOLUTION EPIDURAL; INTRACAUDAL at 11:42:00

## 2020-02-27 RX ADMIN — TRANEXAMIC ACID 1000 MG: 10 INJECTION, SOLUTION INTRAVENOUS at 11:48:00

## 2020-02-27 RX ADMIN — MORPHINE SULFATE 0.3 MG: 1 INJECTION, SOLUTION EPIDURAL; INTRATHECAL; INTRAVENOUS at 11:42:00

## 2020-02-27 RX ADMIN — LIDOCAINE HYDROCHLORIDE 3 ML: 10 INJECTION, SOLUTION INFILTRATION; PERINEURAL at 11:42:00

## 2020-02-27 RX ADMIN — CEFAZOLIN SODIUM/WATER 2 G: 2 G/20 ML SYRINGE (ML) INTRAVENOUS at 11:50:00

## 2020-02-27 RX ADMIN — SODIUM CHLORIDE, SODIUM LACTATE, POTASSIUM CHLORIDE, CALCIUM CHLORIDE: 600; 310; 30; 20 INJECTION, SOLUTION INTRAVENOUS at 12:57:00

## 2020-02-27 NOTE — PROGRESS NOTES
[facility] HOSP - [facility]    Progress Note    Moncho Weber Patient Status:  Surgery Admit - Inpt    3/29/1950 MRN [id number]   Location [address] [facility] Attending Mehreen Gardiner MD   Hosp Day # 0 PCP Myrtle Garcia MD without long-term current use of insulin ([facility] [address]  CONT HOME MEDS, MONITOR ACCU CHECKS. Obstructive sleep apnea on CPAP  CONT CPAP, GODFREY PROTOCOL. Essential hypertension  CONT HOME MEDS, MONITOR.        CAD (coronary artery disease)  LAD STENT 2/201

## 2020-02-27 NOTE — H&P
[address] Ralph Patient Status:  Surgery Admit - Inpt    3/29/1950 MRN [id number]   Location [facility] Attending Gill Miranda MD   Hosp Day # 0 PCP Sukhwinder Martins MD     Da Associated Father    • Obesity Father    • Heart Attack Father 50        Per NG: massive heart attack ( cause of death)   • Cancer Mother 67        Per NG: leukemia ( cause of death)   • Other (Other) Maternal Grandmother    • Other (Other) Maternal Grandf BMI 34.70 kg/m²   HEENT: Exam is unremarkable. Without scleral icterus. Mucous membranes are moist. Pupils are equal and round, reactive to light and accommodate. Pupils are approximately 3mm and react to 2mm with reaction to light.   Oropharynx is clear

## 2020-02-27 NOTE — ANESTHESIA PROCEDURE NOTES
Spinal Block  Date/Time: 2/27/2020 11:42 AM  Performed by:  Cindy Fishman CRNA  Authorized by: Angeli Arciniega MD       General Information and Staff    Start Time:  2/27/2020 11:38 AM  End Time:  2/27/2020 11:27 AM  Anesthesiologist:  Angeli Arciniega,

## 2020-02-27 NOTE — PROGRESS NOTES
Metformin ER  is a non-formulary medication and will be therapeutically interchanged to Metformin regular release twice daily  per P&T protocol

## 2020-02-27 NOTE — ANESTHESIA PREPROCEDURE EVALUATION
Anesthesia PreOp Note    HPI:     Zeeshan William is a 68year old male who presents for preoperative consultation requested by: Radha Damon MD    Date of Surgery: 2/27/2020    Procedure(s):  KNEE TOTAL REPLACEMENT  Indication: osteoarthritis    Relevan Visual impairment     READERS       Past Surgical History:   Procedure Laterality Date   • CATH BARE METAL STENT (BMS)     • COLONOSCOPY     • COLONOSCOPY     • COLONOSCOPY N/A 4/11/2018    Performed by Sarahi Lopez MD at [address] [place] [address] ENDOSCOPY   • EGD Alcohol and Other Disorders Associated Father    • Obesity Father    • Heart Attack Father 50        Per NG: massive heart attack ( cause of death)   • Cancer Mother 67        Per NG: leukemia ( cause of death)   • Other (Other) Maternal Grandmother    • O Physically abused: Not on file        Forced sexual activity: Not on file    Other Topics      Concerns:         Service: Not Asked        Blood Transfusions: Not Asked        Caffeine Concern: Yes          Per NG: coffee, 3 cups        Occupationa negative ROS     GI/Hepatic/Renal    (+) GERD, liver disease,     Endo/Other    (+) diabetes mellitus type 2 well controlled,   Abdominal  - normal exam               Anesthesia Plan:   ASA:  3  Plan:   Spinal and general  Post-op Pain Management: Intrathe

## 2020-02-27 NOTE — BRIEF OP NOTE
Pre-Operative Diagnosis: osteoarthritis     Post-Operative Diagnosis: osteoarthritis      Procedure Performed:   Procedure(s):  right total knee arthroplasty    Surgeon(s) and Role:     Libby Aparicio MD - Primary    Assistant(s):  Surgical Assistant.:

## 2020-02-27 NOTE — ANESTHESIA POSTPROCEDURE EVALUATION
Patient: Nate Persaud    Procedure Summary     Date:  02/27/20 Room / Location:  [facility] OR  / [facility] OR    Anesthesia Start:  5759 Anesthesia Stop:  1453    Procedure:  KNEE TOTAL REPLACEMENT (Right Knee) Diagnosis:  (osteoarthritis)    Surgeon:  Cristiano Michaels

## 2020-02-27 NOTE — PLAN OF CARE
Patient is alert and oriented X3. Alvarado in place. VS WNL, on room air. Tele in place. GODFREY and CPAP. Blood glucose checks Ac and HS. Iv fluids continued. CMS intact to RLE, dressing is clean and dry. Polar ice to knee.  [place]em[place]ac to surgical site, to bulb suct SAFETY ADULT - FALL  Goal: Free from fall injury  Description  INTERVENTIONS:  - Assess pt frequently for physical needs  - Identify cognitive and physical deficits and behaviors that affect risk of falls.   - [place] fall precautions as indicated by asse

## 2020-02-28 LAB
DEPRECATED RDW RBC AUTO: 48.4 FL (ref 35.1–46.3)
ERYTHROCYTE [DISTWIDTH] IN BLOOD BY AUTOMATED COUNT: 14.6 % (ref 11–15)
GLUCOSE BLDC GLUCOMTR-MCNC: 127 MG/DL (ref 70–99)
GLUCOSE BLDC GLUCOMTR-MCNC: 133 MG/DL (ref 70–99)
GLUCOSE BLDC GLUCOMTR-MCNC: 134 MG/DL (ref 70–99)
GLUCOSE BLDC GLUCOMTR-MCNC: 134 MG/DL (ref 70–99)
HCT VFR BLD AUTO: 37 % (ref 39–53)
HGB BLD-MCNC: 12.2 G/DL (ref 13–17.5)
MCH RBC QN AUTO: 29.8 PG (ref 26–34)
MCHC RBC AUTO-ENTMCNC: 33 G/DL (ref 31–37)
MCV RBC AUTO: 90.5 FL (ref 80–100)
PLATELET # BLD AUTO: 169 10(3)UL (ref 150–450)
RBC # BLD AUTO: 4.09 X10(6)UL (ref 3.8–5.8)
WBC # BLD AUTO: 9.9 X10(3) UL (ref 4–11)

## 2020-02-28 PROCEDURE — 99232 SBSQ HOSP IP/OBS MODERATE 35: CPT | Performed by: HOSPITALIST

## 2020-02-28 NOTE — PHYSICAL THERAPY NOTE
PHYSICAL THERAPY KNEE TREATMENT NOTE - INPATIENT     Room Number: 421/421-A             Presenting Problem: R TKA    Problem List  Principal Problem:    Osteoarthritis of right knee  Active Problems:    Mixed hyperlipidemia    Type 2 diabetes mellitus with as Tolerated       PAIN ASSESSMENT   Rating: 10  Location: R knee  Management Techniques: Activity promotion; Body mechanics;Repositioning    BALANCE  Static Sitting: Normal  Dynamic Sitting: Good  Static Standing: Fair  Dynamic Standing: Fair -        AM-P is able to demonstrate transfers Sit to/from Stand at assistance level: modified independent      Goal #2  Current Status  Min A x 1   Goal #3 Patient is able to ambulate 300 feet with assistive device at assistance level: Supervision   Goal #3   Current S

## 2020-02-28 NOTE — CM/SW NOTE
SW received MDO for s/p joint. SW met with pt to complete an initial assessment. SW confirmed pt's address and phone number with the pt. Pt lives in a 2 level  house with spouse. There is/are 3 steps into the home and 0 steps to the bedrooms.  Pt states hav

## 2020-02-28 NOTE — PROGRESS NOTES
[facility]    Progress Note    Renée Oneal Patient Status:  Inpatient    3/29/1950 MRN [id number]   Location [facility] 4W/SW/SE Attending Camila Rico MD   Hosp Day # 1 PCP Roseann Poole MD     Subjective:   Renée Oneal

## 2020-02-28 NOTE — PROGRESS NOTES
[facility]  Hospitalist Progress Note     Zeeshan William Patient Status:  Inpatient    3/29/1950  68year old [facility] [id number]   Location 421/421-A Attending Luli Bennett MD   Hosp Day # 1 PCP Carey Mckeon MD     ASSESSMENT/PLAN      Os 168 hours. No results for input(s): PT, INR, PTT in the last 168 hours.     • Senna  17.2 mg Oral Nightly   • docusate sodium  100 mg Oral BID   • rivaroxaban  10 mg Oral Q24H   • atorvastatin  20 mg Oral Daily   • metFORMIN HCl  500 mg Oral BID with meals

## 2020-02-28 NOTE — OCCUPATIONAL THERAPY NOTE
OCCUPATIONAL THERAPY EVALUATION - INPATIENT      Room Number: 421/421-A  Evaluation Date: 2/28/2020  Type of Evaluation: Initial  Presenting Problem: R TKA    Physician Order: IP Consult to Occupational Therapy  Reason for Therapy: ADL/IADL Dysfunction and stand from bedside chair with min A and use of r/w. Pt took small steps to the bed. Pt reported that he could not move his L leg during ambulation to the bed. With verbal and gestural cues, pt was able to move L leg.  Pt completed stand > sit on bed with CG wrists   • Hemorrhoids    • High blood pressure    • High cholesterol    • Other and unspecified hyperlipidemia    • Peptic ulcer disease    • Sleep apnea     USES CPAP   • Visual impairment     READERS       Past Surgical History  Past Surgical History: mechanics    ACTIVITY TOLERANCE  Denied dizziness     O2 SATURATIONS  Room air      COGNITION  Following Commands:  follows one-step commands inconsistently  Safety Judgement:  decreased awareness of need for safety    RANGE OF MOTION   Upper extremity ROM applied; Alarm set    OT Goals  Patient self-stated goal is: home      Patient will complete LE dressing with SBA  Comment:     Patient will complete toilet transfer with mod I  Comment:     Patient will complete OFH in standing at sink for 5 minutes with m

## 2020-02-28 NOTE — OPERATIVE REPORT
[facility]    PATIENT'S NAME: Purnima Pierce   ATTENDING PHYSICIAN: Kip Cai MD   OPERATING PHYSICIAN: Gerson Preciado MD   PATIENT ACCOUNT#:   [de-identified]    LOCATION:  [facility] [address] PACU [address] [place] [address]  MEDICAL RECORD #:   [id number]       [facility] hole was made anterior to the intercondylar notch. Distal cutting block was set at 5 degrees of valgus and impacted flush to the femur. Distal femoral cutting guide was pinned onto the anterior femur and distal femoral cut was performed.   Sizing apparatu Sterile dressing was applied. Patient was awoken from anesthesia and brought to recovery room in stable condition.     Dictated By John Alford MD  d: 02/27/2020 13:15:07  t: 02/27/2020 16:09:58  Job [id number]  JLY/

## 2020-02-28 NOTE — ANESTHESIA POST-OP FOLLOW-UP NOTE
Acute pain rounds  S/P Duramorph  Good pain control  Side effects:  None  Sensory/motor grossly intact  Will sign off

## 2020-02-28 NOTE — PHYSICAL THERAPY NOTE
PHYSICAL THERAPY KNEE EVALUATION - INPATIENT       Room Number: 421/421-A  Evaluation Date: 2/28/2020  Type of Evaluation: Initial  Physician Order: PT Eval and Treat    Presenting Problem: R TKA  Reason for Therapy: Mobility Dysfunction and Discharge Plan mobility; Body mechanics; Endurance; Energy conservation;Patient education;Gait training;Family education;Range of motion;Strengthening;Stoop training;Transfer training;Stair training;Balance training  Rehab Potential : Good  Frequency (Obs): BID       PHYSIC Layout: One level  Stairs to Enter : 3  Railing: Yes          Lives With: Spouse  Drives: Yes  Patient Owned Equipment: None  Patient Regularly Uses: Glasses    Prior Level of [place]: Ind in ADL's and IADL's +driving    SUBJECTIVE  \"Lot's of pain.  I CK    FUNCTIONAL ABILITY STATUS  Gait Assessment   Gait Assistance: Minimum assistance  Distance (ft): 60ft  Assistive Device: Rolling walker  Pattern: R Decreased stance time(decreased ernesto speed)  Stoop/Curb Assistance: Not tested         Exercise/Edu

## 2020-02-29 LAB
DEPRECATED RDW RBC AUTO: 46.5 FL (ref 35.1–46.3)
ERYTHROCYTE [DISTWIDTH] IN BLOOD BY AUTOMATED COUNT: 14.4 % (ref 11–15)
GLUCOSE BLDC GLUCOMTR-MCNC: 128 MG/DL (ref 70–99)
GLUCOSE BLDC GLUCOMTR-MCNC: 130 MG/DL (ref 70–99)
GLUCOSE BLDC GLUCOMTR-MCNC: 139 MG/DL (ref 70–99)
GLUCOSE BLDC GLUCOMTR-MCNC: 150 MG/DL (ref 70–99)
HCT VFR BLD AUTO: 36 % (ref 39–53)
HGB BLD-MCNC: 12.1 G/DL (ref 13–17.5)
MCH RBC QN AUTO: 30 PG (ref 26–34)
MCHC RBC AUTO-ENTMCNC: 33.6 G/DL (ref 31–37)
MCV RBC AUTO: 89.3 FL (ref 80–100)
PLATELET # BLD AUTO: 175 10(3)UL (ref 150–450)
RBC # BLD AUTO: 4.03 X10(6)UL (ref 3.8–5.8)
WBC # BLD AUTO: 11.5 X10(3) UL (ref 4–11)

## 2020-02-29 PROCEDURE — 99232 SBSQ HOSP IP/OBS MODERATE 35: CPT | Performed by: HOSPITALIST

## 2020-02-29 NOTE — PLAN OF CARE
Problem: PAIN - ADULT  Goal: Verbalizes/displays adequate comfort level or patient's stated pain goal  Description  INTERVENTIONS:  - Encourage pt to monitor pain and request assistance  - Assess pain using appropriate pain scale  - Administer analgesics facility with appropriate resources  Description  INTERVENTIONS:  - Identify barriers to discharge w/pt and caregiver  - Include patient/family/discharge partner in discharge planning  - Arrange for needed discharge resources and transportation as appropri

## 2020-02-29 NOTE — PLAN OF CARE
Problem: Patient Centered Care  Goal: Patient preferences are identified and integrated in the patient's plan of care  Description  Interventions:  - What would you like us to know as we care for you?  I AM A RETIRED   - Provide timely, complete, and a Instruct pt to call for assistance with activity based on assessment  - Modify environment to reduce risk of injury  - Provide assistive devices as appropriate  - Consider OT/PT consult to assist with strengthening/mobility  - Encourage toileting schedule INCREASE IN HEART RATE THAT TELE NOTIFIED ABOUT. PATIENT ASYMPTOMATIC, DENIES CHEST PAIN WAS NOT SHORT OF BREATH. PATIENT IS HAS SCD IN PLACE AND IS ON XARELTO FOR DVT PROPHYLAXIS. PATIENT IS VOIDING FREELY. PATIENT IS UP WITH ONE ASSIST AND WALKER.  ROBINSON

## 2020-02-29 NOTE — PROGRESS NOTES
[facility]    Progress Note    Yasmine Davey Patient Status:  Inpatient    3/29/1950 MRN [id number]   Location [facility] 4W/SW/SE Attending Oscar Guillen MD   Hosp Day # 2 PCP Fracisco Jackson MD     Subjective:   Yasmine Davey

## 2020-02-29 NOTE — PLAN OF CARE
Patient drowsy at times throughout the day. [address] for PRN pain control. Voided following catheter removal. Up with 1 assist with walker. Encouraged frequent ambulation, NORCO for PRN pain control. Tele monitoring in place.  POlar Care for additional pain re Progressing     Problem: SAFETY ADULT - FALL  Goal: Free from fall injury  Description  INTERVENTIONS:  - Assess pt frequently for physical needs  - Identify cognitive and physical deficits and behaviors that affect risk of falls.   - Mayking fall precaut management of diabetes  Outcome: Progressing

## 2020-02-29 NOTE — PHYSICAL THERAPY NOTE
PHYSICAL THERAPY TREATMENT NOTE - INPATIENT     Room Number: 421/421-A       Presenting Problem: R TKA    Problem List  Principal Problem:    Osteoarthritis of right knee  Active Problems:    Mixed hyperlipidemia    Type 2 diabetes mellitus without complic patient currently have. ..  -   Turning over in bed (including adjusting bedclothes, sheets and blankets)?: A Little   -   Sitting down on and standing up from a chair with arms (e.g., wheelchair, bedside commode, etc.): A Little   -   Moving from lying on degrees flexion R knee   Goal #5   Current Status In progress   Goal #6 Patient independently performs home exercise program for ROM/strengthening per the instructions provided in preparation for discharge.    Goal #6  Current Status In progress

## 2020-02-29 NOTE — PROGRESS NOTES
[facility]  Hospitalist Progress Note     Yasmine Davey Patient Status:  Inpatient    3/29/1950  68year old [facility] [id number]   Location 421/421-A Attending Oscar Guillen MD   Hosp Day # 2 PCP Fracisco Jackson MD     ASSESSMENT/PLAN      Os CA, ALB, NA, K, CL, CO2, ALKPHO, AST, ALT, BILT, TP in the last 168 hours. No results for input(s): PT, INR, PTT in the last 168 hours.     • Senna  17.2 mg Oral Nightly   • docusate sodium  100 mg Oral BID   • rivaroxaban  10 mg Oral Q24H   • atorvastatin

## 2020-03-01 VITALS
HEART RATE: 114 BPM | RESPIRATION RATE: 18 BRPM | SYSTOLIC BLOOD PRESSURE: 150 MMHG | WEIGHT: 235 LBS | TEMPERATURE: 99 F | DIASTOLIC BLOOD PRESSURE: 92 MMHG | OXYGEN SATURATION: 96 % | HEIGHT: 69 IN | BODY MASS INDEX: 34.8 KG/M2

## 2020-03-01 LAB
DEPRECATED RDW RBC AUTO: 46.2 FL (ref 35.1–46.3)
ERYTHROCYTE [DISTWIDTH] IN BLOOD BY AUTOMATED COUNT: 14.3 % (ref 11–15)
GLUCOSE BLDC GLUCOMTR-MCNC: 159 MG/DL (ref 70–99)
HCT VFR BLD AUTO: 34.9 % (ref 39–53)
HCT VFR BLD CALC: 34.9 %
HGB BLD-MCNC: 11.9 G/DL
HGB BLD-MCNC: 11.9 G/DL (ref 13–17.5)
MCH RBC QN AUTO: 30.1 PG
MCH RBC QN AUTO: 30.1 PG (ref 26–34)
MCHC RBC AUTO-ENTMCNC: 34.1 G/DL
MCHC RBC AUTO-ENTMCNC: 34.1 G/DL (ref 31–37)
MCV RBC AUTO: 88.4 FL
MCV RBC AUTO: 88.4 FL (ref 80–100)
PLATELET # BLD AUTO: 206 10(3)UL (ref 150–450)
PLATELET # BLD: 206 K/MCL
RBC # BLD AUTO: 3.95 X10(6)UL (ref 3.8–5.8)
RBC # BLD: 3.95 10*6/UL
WBC # BLD AUTO: 12.3 X10(3) UL (ref 4–11)
WBC # BLD: 12.3 K/MCL

## 2020-03-01 PROCEDURE — 99239 HOSP IP/OBS DSCHRG MGMT >30: CPT | Performed by: HOSPITALIST

## 2020-03-01 RX ORDER — HYDROCODONE BITARTRATE AND ACETAMINOPHEN 5; 325 MG/1; MG/1
1-2 TABLET ORAL EVERY 6 HOURS PRN
Qty: 40 TABLET | Refills: 0 | Status: SHIPPED | OUTPATIENT
Start: 2020-03-01 | End: 2020-06-17

## 2020-03-01 NOTE — PLAN OF CARE
Patient has remained free from falls throughout stay. Hourly rounding maintained. Pt's bed in lowest position w/ side rails up. Patient has been educated and is compliant w/ call light system. Patient is up with 1 and walker.  Patient has been educated on ADULT  Goal: Absence of fever/infection during anticipated neutropenic period  Description  INTERVENTIONS  - Monitor WBC  - Administer growth factors as ordered  - Implement neutropenic guidelines  Outcome: Progressing     Problem: [facility] and hypoglycemia  - Administer ordered medications to maintain glucose within target range  - Assess barriers to adequate nutritional intake and initiate nutrition consult as needed  - Instruct patient on self management of diabetes  Outcome: Progressing

## 2020-03-01 NOTE — DISCHARGE SUMMARY
[facility] HOSPITALIST  DISCHARGE SUMMARY     Edmar Muir Patient Status:  Inpatient    3/29/1950 MRN [id number]   Location [facility] 4W/SW/SE Attending No att. providers found   [facility] Day # 3 PCP Mamie Jackson MD     DATE OF ADMISSION: 20 intact distal pulses. No gallop, rub, murmur. Pulm: Effort and breath sounds normal. No distress, wheezes, rales, rhonchi. Abd: Soft, NTND, BS normal, no mass, no HSM, no rebound/guarding. Neuro: Normal reflexes, CN.  Sensory/motor exams grossly normal known as:  June Morrison              Where to Get Your Medications      Please  your prescriptions at the location directed by your doctor or nurse    Bring a paper prescription for each of these medications  · aspirin 325 MG Tbec  · HYDROcodone-acetami

## 2020-03-01 NOTE — PHYSICAL THERAPY NOTE
PHYSICAL THERAPY KNEE TREATMENT NOTE - INPATIENT     Room Number: 421/421-A             Presenting Problem: R TKA    Problem List  Principal Problem:    Osteoarthritis of right knee  Active Problems:    Mixed hyperlipidemia    Type 2 diabetes mellitus with training;Strengthening    SUBJECTIVE  He states that he is in a great deal of pain and has swelling. However he is also ready to go home. He rated his pain (with medication) at a level of \"4\" this morning.     OBJECTIVE  Precautions: Limb alert - right questions and concerns addressed; Alarm set    CURRENT GOALS  Goals to be met by: 3/13/2020  Patient Goal Patient's self-stated goal is: return to PLOF   Goal #1 Patient is able to demonstrate supine - sit EOB @ level: modified independent      Goal #1   Cu

## 2020-03-01 NOTE — PROGRESS NOTES
[address]    Progress Note    Erika Doss Patient Status:  Inpatient    3/29/1950 MRN [id number]   Location [facility] 4W/SW/SE Attending Jose Alejandro Bean MD   Hosp Day # 3 PCP Shayy Daniels MD     Subjective:   Erika Doss

## 2020-03-01 NOTE — CM/SW NOTE
F2F and orders entered for RN & PT. SW sent to [facility] and notified Romy Hewitt of pt's discharge.      Niles Zambrano

## 2020-03-02 ENCOUNTER — PATIENT OUTREACH (OUTPATIENT)
Dept: CASE MANAGEMENT | Age: 70
End: 2020-03-02

## 2020-03-02 ENCOUNTER — TELEPHONE (OUTPATIENT)
Dept: INTERNAL MEDICINE CLINIC | Facility: CLINIC | Age: 70
End: 2020-03-02

## 2020-03-02 DIAGNOSIS — Z02.9 ENCOUNTERS FOR ADMINISTRATIVE PURPOSE: ICD-10-CM

## 2020-03-02 DIAGNOSIS — M17.11 OSTEOARTHRITIS OF RIGHT KNEE, UNSPECIFIED OSTEOARTHRITIS TYPE: ICD-10-CM

## 2020-03-02 PROCEDURE — 1111F DSCHRG MED/CURRENT MED MERGE: CPT

## 2020-03-02 NOTE — TELEPHONE ENCOUNTER
Patient dc'd 3/1/2020. He has a follow up appt with Dr. Nilsa Marx on 3/17/2020. He is in TCM and last date for TCM is 3/15/2020. He declined a sooner appt stating that if it was okay with Dr. Nilsa Marx he would like to keep his 3/17 appt.  Please discuss with GUME

## 2020-03-02 NOTE — PROGRESS NOTES
Initial Post Discharge Follow Up   Discharge Date: 3/1/20  Contact Date: 3/2/2020    Consent Verification:  Assessment Completed With: Patient  HIPAA Verified?   Yes    Discharge Dx:   Osteoarthritis of right knee         General:   • How have you been s • FENOFIBRATE 160 MG Oral Tab TAKE 1 TABLET BY MOUTH EVERY EVENING 90 tablet 1   • ATORVASTATIN 20 MG Oral Tab TAKE ONE TABLET BY MOUTH EVERY DAY 90 tablet 1   • Metoprolol Succinate ER 25 MG Oral Tablet 24 Hr Take 0.5 tablets (12.5 mg total) by mouth Da person no   Pre op class online no   Discharge class in person no   Discharge video  no        Needs post D/C:   Now that you are home, are there any needs or concerns you need addressed before your next visit with your PCP?  (DME, meds, disease concerns, sugars are assessed with A1C according to patient. He states that they averaged about 130 while hospitalized. He denied having any n/v/c/d, sob or any new or worsening symptoms. Med review completed.  He has follow up with PCP on 3/17/2020 and declined a so

## 2020-03-02 NOTE — TELEPHONE ENCOUNTER
NCM received message from PCP requesting that patient come in this Wednesday 3/4/2020, in any res24 spot, for a TCM HFU appt. NCM called patient and LM with this information and provided contact information to return call.  Please assist with trying to reac

## 2020-03-03 NOTE — TELEPHONE ENCOUNTER
FYI: Called patient offered him earlier appointment but refused patient states that he rather come on his appointment on 03/17/2020 and he is feeling better day by day but if it gets worse he will call back.

## 2020-03-17 ENCOUNTER — TELEPHONE (OUTPATIENT)
Dept: CARDIOLOGY | Age: 70
End: 2020-03-17

## 2020-03-17 ENCOUNTER — OFFICE VISIT (OUTPATIENT)
Dept: INTERNAL MEDICINE CLINIC | Facility: CLINIC | Age: 70
End: 2020-03-17
Payer: MEDICARE

## 2020-03-17 VITALS
SYSTOLIC BLOOD PRESSURE: 128 MMHG | HEART RATE: 89 BPM | TEMPERATURE: 98 F | HEIGHT: 69 IN | BODY MASS INDEX: 34.01 KG/M2 | DIASTOLIC BLOOD PRESSURE: 84 MMHG | WEIGHT: 229.63 LBS

## 2020-03-17 DIAGNOSIS — Z96.651 STATUS POST RIGHT KNEE REPLACEMENT: Primary | ICD-10-CM

## 2020-03-17 DIAGNOSIS — E11.9 TYPE 2 DIABETES MELLITUS WITHOUT COMPLICATION, WITHOUT LONG-TERM CURRENT USE OF INSULIN (HCC): ICD-10-CM

## 2020-03-17 DIAGNOSIS — E78.2 MIXED HYPERLIPIDEMIA: ICD-10-CM

## 2020-03-17 DIAGNOSIS — I10 ESSENTIAL HYPERTENSION: Chronic | ICD-10-CM

## 2020-03-17 PROBLEM — M17.11 OSTEOARTHRITIS OF RIGHT KNEE: Status: RESOLVED | Noted: 2020-02-18 | Resolved: 2020-03-17

## 2020-03-17 PROCEDURE — G0463 HOSPITAL OUTPT CLINIC VISIT: HCPCS | Performed by: INTERNAL MEDICINE

## 2020-03-17 PROCEDURE — 99214 OFFICE O/P EST MOD 30 MIN: CPT | Performed by: INTERNAL MEDICINE

## 2020-03-17 PROCEDURE — 1111F DSCHRG MED/CURRENT MED MERGE: CPT | Performed by: INTERNAL MEDICINE

## 2020-03-17 RX ORDER — CYCLOBENZAPRINE HCL 5 MG
TABLET ORAL
COMMUNITY
Start: 2020-03-13 | End: 2021-11-18

## 2020-03-17 RX ORDER — CLOPIDOGREL BISULFATE 75 MG/1
TABLET ORAL
COMMUNITY
Start: 2020-02-22 | End: 2021-11-18

## 2020-03-17 RX ORDER — METOPROLOL SUCCINATE 50 MG/1
TABLET, EXTENDED RELEASE ORAL
Qty: 30 TABLET | Refills: 0 | Status: SHIPPED | OUTPATIENT
Start: 2020-03-17 | End: 2020-03-19 | Stop reason: DRUGHIGH

## 2020-03-17 NOTE — ASSESSMENT & PLAN NOTE
His diabetes is well controlled. His A1c was 6.3. The patient does not have neuropathy, nephropathy or retinopathy. Continue current medications diet, and exercise. Next A1c in June.

## 2020-03-17 NOTE — ASSESSMENT & PLAN NOTE
Patient is doing well status post right total knee replacement.   He is point for physical therapy and taking [place] per his orthopedist.  He will follow-up with the orthopedist.

## 2020-03-17 NOTE — PROGRESS NOTES
HPI:    Patient ID: Prudencio Lazaro is a 68year old male. Pt had a R TKR 3 weeks ago. He did home PT then started outpt PT. He is still on aspirin post-op. He will stop the aspirin and start Plavix tomorrow.   He still has some knee pain and uses the • Family history of malignant neoplasm of prostate 11/30/2009   • Fx wrist     Per NG: Fx both wrists   • Hemorrhoids    • High blood pressure    • High cholesterol    • Other and unspecified hyperlipidemia    • Peptic ulcer disease    • Sleep apnea of death)   • Other (Other) Maternal Grandmother    • Other (Other) Maternal Grandfather    • Other (Other) Paternal Grandmother    • Other (Other) Paternal Grandfather    • Kidney Disease Brother         Per NG: Renal disease       Review of Systems   Res Continue present management. Relevant Orders    LIPID PANEL    Essential hypertension (Chronic)     Controlled. Continue present management. The patient expressed understanding and agreed with the plan.     Meds This Visit:  Request

## 2020-03-17 NOTE — PATIENT INSTRUCTIONS
Please schedule your next appointment in June. Do fasting labs 2-4 days before that appointment. Fast for 12 hours. Water and meds are okay. Walk in.

## 2020-03-19 RX ORDER — METOPROLOL SUCCINATE 25 MG/1
12.5 TABLET, EXTENDED RELEASE ORAL DAILY
Qty: 45 TABLET | Refills: 3 | Status: SHIPPED | OUTPATIENT
Start: 2020-03-19 | End: 2020-03-23 | Stop reason: DRUGHIGH

## 2020-03-23 RX ORDER — METOPROLOL SUCCINATE 25 MG/1
25 TABLET, EXTENDED RELEASE ORAL DAILY
Qty: 90 TABLET | Refills: 2 | Status: SHIPPED | OUTPATIENT
Start: 2020-03-23 | End: 2020-11-30

## 2020-03-25 ENCOUNTER — APPOINTMENT (OUTPATIENT)
Dept: ENDOCRINOLOGY | Facility: HOSPITAL | Age: 70
End: 2020-03-25
Attending: INTERNAL MEDICINE
Payer: MEDICARE

## 2020-04-07 DIAGNOSIS — E78.2 MIXED HYPERLIPIDEMIA: ICD-10-CM

## 2020-04-07 RX ORDER — ATORVASTATIN CALCIUM 20 MG/1
TABLET, FILM COATED ORAL
Qty: 90 TABLET | Refills: 1 | Status: SHIPPED | OUTPATIENT
Start: 2020-04-07 | End: 2020-09-23

## 2020-04-27 DIAGNOSIS — E11.9 TYPE 2 DIABETES MELLITUS WITHOUT COMPLICATION, WITHOUT LONG-TERM CURRENT USE OF INSULIN (HCC): ICD-10-CM

## 2020-04-27 RX ORDER — METFORMIN HYDROCHLORIDE 500 MG/1
TABLET, EXTENDED RELEASE ORAL
Qty: 180 TABLET | Refills: 1 | Status: SHIPPED | OUTPATIENT
Start: 2020-04-27 | End: 2020-11-27

## 2020-06-10 ENCOUNTER — APPOINTMENT (OUTPATIENT)
Dept: LAB | Facility: HOSPITAL | Age: 70
End: 2020-06-10
Attending: INTERNAL MEDICINE
Payer: MEDICARE

## 2020-06-10 DIAGNOSIS — E11.9 TYPE 2 DIABETES MELLITUS WITHOUT COMPLICATION, WITHOUT LONG-TERM CURRENT USE OF INSULIN (HCC): ICD-10-CM

## 2020-06-10 DIAGNOSIS — E78.2 MIXED HYPERLIPIDEMIA: ICD-10-CM

## 2020-06-10 LAB
CHOLEST SERPL-MCNC: 157 MG/DL
HDLC SERPL-MCNC: 26 MG/DL
LDLC SERPL CALC-MCNC: 85 MG/DL
LENGTH OF FAST TIME PATIENT: YES H
NONHDLC SERPL-MCNC: 131 MG/DL
TRIGL SERPL-MCNC: 232 MG/DL
VLDLC SERPL CALC-MCNC: 46 MG/DL

## 2020-06-10 PROCEDURE — 36415 COLL VENOUS BLD VENIPUNCTURE: CPT

## 2020-06-10 PROCEDURE — 83036 HEMOGLOBIN GLYCOSYLATED A1C: CPT

## 2020-06-10 PROCEDURE — 80061 LIPID PANEL: CPT

## 2020-06-15 DIAGNOSIS — E78.2 MIXED HYPERLIPIDEMIA: ICD-10-CM

## 2020-06-17 ENCOUNTER — VIRTUAL PHONE E/M (OUTPATIENT)
Dept: INTERNAL MEDICINE CLINIC | Facility: CLINIC | Age: 70
End: 2020-06-17
Payer: MEDICARE

## 2020-06-17 DIAGNOSIS — E78.2 MIXED HYPERLIPIDEMIA: ICD-10-CM

## 2020-06-17 DIAGNOSIS — IMO0002 AT HIGH RISK FOR COMPLICATION OF COVID-19: ICD-10-CM

## 2020-06-17 DIAGNOSIS — I10 ESSENTIAL HYPERTENSION: Primary | ICD-10-CM

## 2020-06-17 DIAGNOSIS — E11.9 TYPE 2 DIABETES MELLITUS WITHOUT COMPLICATION, WITHOUT LONG-TERM CURRENT USE OF INSULIN (HCC): ICD-10-CM

## 2020-06-17 PROCEDURE — 99214 OFFICE O/P EST MOD 30 MIN: CPT | Performed by: INTERNAL MEDICINE

## 2020-06-17 RX ORDER — FENOFIBRATE 160 MG/1
TABLET ORAL
Qty: 90 TABLET | Refills: 1 | Status: SHIPPED | OUTPATIENT
Start: 2020-06-17 | End: 2021-03-11

## 2020-06-17 RX ORDER — FENOFIBRATE 160 MG/1
160 TABLET ORAL EVERY EVENING
Qty: 90 TABLET | Refills: 1 | Status: SHIPPED | OUTPATIENT
Start: 2020-06-17 | End: 2020-10-07

## 2020-06-17 NOTE — ASSESSMENT & PLAN NOTE
I encouraged the patient to continue to practice social isolation. I explained that he is at high risk for complications of [id number].

## 2020-06-17 NOTE — ASSESSMENT & PLAN NOTE
I reviewed the patient's recent lipid panel. He is on both a atorvastatin 20 mg and fenofibrate 160 mg. He needs aggressive lipid control due to his history of coronary disease. He does not have side effects. Continue current medications.

## 2020-06-17 NOTE — PROGRESS NOTES
Video Progress Note  Telehealth Verbal Consent   I conducted a telehealth visit with Aye Farrell today, 06/17/20, which was completed using two-way, real-time interactive audio and video communication.  This has been done in good eliel to provide continu There are no diabetic complications. Risk factors for coronary artery disease include diabetes mellitus, dyslipidemia, obesity, male sex and hypertension. Current diabetic treatment includes oral agent (monotherapy).  He is compliant with treatment most of TAKE 2 TABLETS BY MOUTH DAILY WITH BREAKFAST 180 tablet 1   • ATORVASTATIN 20 MG Oral Tab TAKE ONE TABLET BY MOUTH EVERY DAY 90 tablet 1   • cyclobenzaprine 5 MG Oral Tab TK 1 T PO Q 8 H PRN     • Clopidogrel Bisulfate 75 MG Oral Tab TK 1 T PO  QD.     • M and atraumatic. Eyes: EOM are normal.   Pulmonary/Chest: Effort normal. No respiratory distress. Neurological: He is alert and oriented to person, place, and time. Psychiatric: He has a normal mood and affect.  Thought content normal.              ASS

## 2020-06-17 NOTE — ASSESSMENT & PLAN NOTE
A1c was 6.4 which is very good. He is up-to-date on his eye exam.  Continue current medications. Next A1c in 6 months.

## 2020-06-17 NOTE — PATIENT INSTRUCTIONS
Do non-fasting labs 2-3 days prior to the next appointment in December. At this time, they are asking patients to schedule blood tests, rather than walk-in. Please call central registration at [phone].       Please get the new recombinant Shingles

## 2020-07-27 ENCOUNTER — TELEPHONE (OUTPATIENT)
Dept: CARDIOLOGY | Age: 70
End: 2020-07-27

## 2020-08-21 ENCOUNTER — APPOINTMENT (OUTPATIENT)
Dept: CARDIOLOGY | Age: 70
End: 2020-08-21

## 2020-08-24 ENCOUNTER — TELEPHONE (OUTPATIENT)
Dept: SURGERY | Facility: CLINIC | Age: 70
End: 2020-08-24

## 2020-08-24 ENCOUNTER — LAB ENCOUNTER (OUTPATIENT)
Dept: LAB | Facility: HOSPITAL | Age: 70
End: 2020-08-24
Attending: UROLOGY
Payer: MEDICARE

## 2020-08-24 ENCOUNTER — OFFICE VISIT (OUTPATIENT)
Dept: SURGERY | Facility: CLINIC | Age: 70
End: 2020-08-24
Payer: MEDICARE

## 2020-08-24 VITALS
HEART RATE: 61 BPM | DIASTOLIC BLOOD PRESSURE: 79 MMHG | BODY MASS INDEX: 32.93 KG/M2 | HEIGHT: 70 IN | WEIGHT: 230 LBS | SYSTOLIC BLOOD PRESSURE: 123 MMHG | RESPIRATION RATE: 16 BRPM

## 2020-08-24 DIAGNOSIS — R97.20 ELEVATED PSA: Primary | ICD-10-CM

## 2020-08-24 DIAGNOSIS — R97.20 ELEVATED PSA: ICD-10-CM

## 2020-08-24 DIAGNOSIS — Z79.82 LONG TERM CURRENT USE OF ASPIRIN: ICD-10-CM

## 2020-08-24 DIAGNOSIS — Z79.02 LONG TERM CURRENT USE OF CLOPIDOGREL: ICD-10-CM

## 2020-08-24 DIAGNOSIS — N40.2 PROSTATE NODULE: ICD-10-CM

## 2020-08-24 DIAGNOSIS — R35.1 BENIGN PROSTATIC HYPERPLASIA WITH NOCTURIA: ICD-10-CM

## 2020-08-24 DIAGNOSIS — N40.1 BENIGN PROSTATIC HYPERPLASIA WITH NOCTURIA: ICD-10-CM

## 2020-08-24 DIAGNOSIS — N52.01 ERECTILE DYSFUNCTION DUE TO ARTERIAL INSUFFICIENCY: ICD-10-CM

## 2020-08-24 DIAGNOSIS — R35.1 NOCTURIA: ICD-10-CM

## 2020-08-24 LAB
PSA FREE MFR SERPL: 38 %
PSA FREE SERPL-MCNC: 3.26 NG/ML
PSA SERPL-MCNC: 8.64 NG/ML (ref ?–4)

## 2020-08-24 PROCEDURE — 84153 ASSAY OF PSA TOTAL: CPT

## 2020-08-24 PROCEDURE — 36415 COLL VENOUS BLD VENIPUNCTURE: CPT

## 2020-08-24 PROCEDURE — 99214 OFFICE O/P EST MOD 30 MIN: CPT | Performed by: UROLOGY

## 2020-08-24 PROCEDURE — 84154 ASSAY OF PSA FREE: CPT

## 2020-08-24 PROCEDURE — G0463 HOSPITAL OUTPT CLINIC VISIT: HCPCS | Performed by: UROLOGY

## 2020-08-24 RX ORDER — CLOPIDOGREL BISULFATE 75 MG/1
TABLET ORAL
Qty: 90 TABLET | Refills: 0 | Status: SHIPPED | OUTPATIENT
Start: 2020-08-24 | End: 2020-11-30

## 2020-08-24 RX ORDER — CIPROFLOXACIN 500 MG/1
TABLET, FILM COATED ORAL
Qty: 6 TABLET | Refills: 0 | Status: SHIPPED | OUTPATIENT
Start: 2020-08-24 | End: 2021-04-12 | Stop reason: ALTCHOICE

## 2020-08-24 RX ORDER — CEFDINIR 300 MG/1
300 CAPSULE ORAL EVERY 12 HOURS
Qty: 6 CAPSULE | Refills: 0 | Status: SHIPPED | OUTPATIENT
Start: 2020-08-24 | End: 2020-08-27

## 2020-08-24 NOTE — PATIENT INSTRUCTIONS
Viola Izaguirre M.D.      1.  Proceed with plans for ultrasound-guided prostate biopsy in the office    2.   Plan to stop clopidogrel/Plavix 7 days before procedure--we are awaiting okay from your cardiologist Dr. Nikhil Blanco

## 2020-08-24 NOTE — TELEPHONE ENCOUNTER
Dear Dr. Megan Garibay has an elevated PSA of 6.13 and also a prominent right prostate nodule; he is in need of a prostate biopsy. He is on clopidogrel/Plavix status post drug-eluting stent placed 2/19/2019. Would you allow him to hold Plavix for 6 or 7 days before biopsy, and then restart 1 day after the biopsy? Furthermore, would it be okay to hold his baby aspirin for 7 days before procedure? Would he need any \"bridging\" with Lovenox? Please note that patient has an appointment to see you this Friday on 8/28/2020. Please let me know when you have a chance.     Many thanks,    Marquez Carcamo, urology

## 2020-08-24 NOTE — PROGRESS NOTES
HPI:    Patient ID: Chioma Rosas is a 65year old male. HPI     Elevated PSA  Chronic. Problem started 12/06/2011 with PSA = 4.5. Most recent 01/31/2020 PSA = 6.13, 8% free PSA (70.3% probability of prostate cancer).  Patient denies associated symptoms records:  3/5/18 office visit with Dr. Arriaza Links: hx of prostate nodule for over 10 years; prominent right to mid base 1.5 cm nodule raised; AUA symptom score continues at a level of 0;  4 different transrectal US and prostate biopsies: 1999, 2000, 2004; when abdominal pain and constipation. Genitourinary: Negative for dysuria, flank pain and hematuria. Positive for urinary frequency less than 2 hours and nocturia 1x. Skin: Negative for color change. Neurological: Negative for speech difficulty. Past Surgical History:   Procedure Laterality Date   • CATH BARE METAL STENT (BMS)     • COLONOSCOPY     • COLONOSCOPY     • COLONOSCOPY N/A 4/11/2018    Performed by Yusra Art MD at [address] [place] [address] ENDOSCOPY   • EGD     • ELECTROCARDIOGRAM, COMPLETE  12 normal. No respiratory distress. Genitourinary:    Genitourinary Comments: On JOSE CARLOS, prostate 2+ enlarged, right lobe nodule, no palpable nodules or indurations. Musculoskeletal: Normal range of motion.    Neurological: He is alert and oriented to pers features that are suspicious for malignancy     1/14/19 CT Chest Abdomen Dissect Set = kidneys NL; lymph nodes no enlarged lymph nodes; bones multilevel degenerative changes of the spine.       I spent 29  minutes with patient, and greater than 50% of this aspirin and how long he could hold these medications before undergoing urological procedure; patient is to see Dr. Mcrae Records this Friday, 08/28/2020.  I emphasized the 1% probability of infection with trans-rectal ultrasound guided prostate biopsy including u will need to hold medication prior to any surgical procedure.     (Z79.82) Long term current use of aspirin  Patient is currently taking aspirin 81 mg daily and is at risk for future surgical procedures and will need to hold medication prior to any surgica for PSA--total and free; if the PSA is still elevated and similar to what it was in January, proceed with biopsy as above    12.   Await instructions from Dr. Ileana Saba concerning clopidogrel/Plavix and low-dose aspirin 81 mg before the biopsy      Orders Mimi

## 2020-08-26 ENCOUNTER — TELEPHONE (OUTPATIENT)
Dept: SURGERY | Facility: CLINIC | Age: 70
End: 2020-08-26

## 2020-08-28 ENCOUNTER — OFFICE VISIT (OUTPATIENT)
Dept: CARDIOLOGY | Age: 70
End: 2020-08-28

## 2020-08-28 VITALS
SYSTOLIC BLOOD PRESSURE: 124 MMHG | HEIGHT: 70 IN | BODY MASS INDEX: 33.21 KG/M2 | WEIGHT: 232 LBS | HEART RATE: 79 BPM | OXYGEN SATURATION: 95 % | DIASTOLIC BLOOD PRESSURE: 74 MMHG

## 2020-08-28 DIAGNOSIS — I10 ESSENTIAL HYPERTENSION: ICD-10-CM

## 2020-08-28 DIAGNOSIS — R06.02 SHORTNESS OF BREATH: Primary | ICD-10-CM

## 2020-08-28 DIAGNOSIS — R07.82 INTERCOSTAL PAIN: ICD-10-CM

## 2020-08-28 DIAGNOSIS — I25.10 CORONARY ARTERY DISEASE INVOLVING NATIVE CORONARY ARTERY OF NATIVE HEART WITHOUT ANGINA PECTORIS: ICD-10-CM

## 2020-08-28 PROCEDURE — 99214 OFFICE O/P EST MOD 30 MIN: CPT | Performed by: INTERNAL MEDICINE

## 2020-08-28 SDOH — HEALTH STABILITY: MENTAL HEALTH: HOW MANY STANDARD DRINKS CONTAINING ALCOHOL DO YOU HAVE ON A TYPICAL DAY?: 1 OR 2

## 2020-08-28 SDOH — HEALTH STABILITY: MENTAL HEALTH: HOW OFTEN DO YOU HAVE A DRINK CONTAINING ALCOHOL?: MONTHLY OR LESS

## 2020-08-28 ASSESSMENT — PATIENT HEALTH QUESTIONNAIRE - PHQ9
SUM OF ALL RESPONSES TO PHQ9 QUESTIONS 1 AND 2: 0
SUM OF ALL RESPONSES TO PHQ9 QUESTIONS 1 AND 2: 0
CLINICAL INTERPRETATION OF PHQ2 SCORE: NO FURTHER SCREENING NEEDED
CLINICAL INTERPRETATION OF PHQ9 SCORE: NO FURTHER SCREENING NEEDED
1. LITTLE INTEREST OR PLEASURE IN DOING THINGS: NOT AT ALL
2. FEELING DOWN, DEPRESSED OR HOPELESS: NOT AT ALL

## 2020-08-31 ENCOUNTER — TELEPHONE (OUTPATIENT)
Dept: SURGERY | Facility: CLINIC | Age: 70
End: 2020-08-31

## 2020-09-01 NOTE — TELEPHONE ENCOUNTER
S/W pt and explained that I do not see any message as to the reason we were calling him other than a recent msg that [facility] sen via my chart regarding his PSA results. ( see below). Pt stated that he got that msg.  Pt also stated that our system of handling deann

## 2020-09-23 DIAGNOSIS — E78.2 MIXED HYPERLIPIDEMIA: ICD-10-CM

## 2020-09-23 RX ORDER — ATORVASTATIN CALCIUM 20 MG/1
20 TABLET, FILM COATED ORAL DAILY
Qty: 90 TABLET | Refills: 0 | Status: SHIPPED | OUTPATIENT
Start: 2020-09-23 | End: 2020-11-27

## 2020-10-07 ENCOUNTER — PROCEDURE (OUTPATIENT)
Dept: SURGERY | Facility: CLINIC | Age: 70
End: 2020-10-07
Payer: MEDICARE

## 2020-10-07 VITALS
DIASTOLIC BLOOD PRESSURE: 80 MMHG | HEART RATE: 64 BPM | WEIGHT: 232 LBS | BODY MASS INDEX: 33.21 KG/M2 | SYSTOLIC BLOOD PRESSURE: 144 MMHG | RESPIRATION RATE: 16 BRPM | TEMPERATURE: 98 F | HEIGHT: 70 IN

## 2020-10-07 DIAGNOSIS — R97.20 ELEVATED PSA: Primary | ICD-10-CM

## 2020-10-07 DIAGNOSIS — N40.1 BENIGN PROSTATIC HYPERPLASIA WITH NOCTURIA: ICD-10-CM

## 2020-10-07 DIAGNOSIS — R97.20 RISING PSA LEVEL: ICD-10-CM

## 2020-10-07 DIAGNOSIS — R35.1 NOCTURIA: ICD-10-CM

## 2020-10-07 DIAGNOSIS — Z79.02 LONG TERM CURRENT USE OF CLOPIDOGREL: ICD-10-CM

## 2020-10-07 DIAGNOSIS — N40.2 PROSTATE NODULE: ICD-10-CM

## 2020-10-07 DIAGNOSIS — R35.1 BENIGN PROSTATIC HYPERPLASIA WITH NOCTURIA: ICD-10-CM

## 2020-10-07 DIAGNOSIS — N52.01 ERECTILE DYSFUNCTION DUE TO ARTERIAL INSUFFICIENCY: ICD-10-CM

## 2020-10-07 DIAGNOSIS — Z79.82 LONG TERM CURRENT USE OF ASPIRIN: ICD-10-CM

## 2020-10-07 PROCEDURE — 55700 BIOPSY OF PROSTATE,NEEDLE/PUNCH: CPT | Performed by: UROLOGY

## 2020-10-07 PROCEDURE — 76872 US TRANSRECTAL: CPT | Performed by: UROLOGY

## 2020-10-07 NOTE — PATIENT INSTRUCTIONS
Quin Escobar M.D.    1. Finish your prescribed 3 day course of the 2 different antibiotics as directed. 2. No heavy lifting or strenuous physical activity for a few days.     3. No aspirin or NSAIDs for 24 hours

## 2020-10-07 NOTE — PROGRESS NOTES
[address] Patient Status:  Outpatient    3/29/1950 MRN [id number]   Location [address] [place] [address] Attending Jerome Nagel MD    [facility] Day # 0 PCP MD Aria Camara is endorectal probe. pictures were taken of the base, mid-aspect, and apical aspects for the prostate gland and these were preserved on hard copy. Pathology was noted.  Prostate volume was determined by imaging the prostate in the transverse and sagittal plan and discharge instructions (if applicable) and agree that the record reflects my personal performance and is accurate and complete.   Jennifer Fishman MD, 10/7/2020, 4:39 PM

## 2020-10-11 DIAGNOSIS — R97.20 ELEVATED PSA: ICD-10-CM

## 2020-10-11 DIAGNOSIS — R35.1 NOCTURIA: Primary | ICD-10-CM

## 2020-10-20 ENCOUNTER — TELEPHONE (OUTPATIENT)
Dept: CARDIOLOGY | Age: 70
End: 2020-10-20

## 2020-11-26 DIAGNOSIS — E78.2 MIXED HYPERLIPIDEMIA: ICD-10-CM

## 2020-11-27 DIAGNOSIS — E11.9 TYPE 2 DIABETES MELLITUS WITHOUT COMPLICATION, WITHOUT LONG-TERM CURRENT USE OF INSULIN (HCC): ICD-10-CM

## 2020-11-27 RX ORDER — METFORMIN HYDROCHLORIDE 500 MG/1
1000 TABLET, EXTENDED RELEASE ORAL
Qty: 180 TABLET | Refills: 1 | Status: SHIPPED | OUTPATIENT
Start: 2020-11-27 | End: 2021-05-17

## 2020-11-27 RX ORDER — ATORVASTATIN CALCIUM 20 MG/1
TABLET, FILM COATED ORAL
Qty: 90 TABLET | Refills: 0 | Status: SHIPPED | OUTPATIENT
Start: 2020-11-27 | End: 2021-02-24

## 2020-11-27 NOTE — TELEPHONE ENCOUNTER
Pharmacy request for refill    •  METFORMIN HCL  MG Oral Tablet 24 Hr, TAKE 2 TABLETS BY MOUTH DAILY WITH BREAKFAST, Disp: 180 tablet, Rfl: 1

## 2020-11-30 RX ORDER — CLOPIDOGREL BISULFATE 75 MG/1
TABLET ORAL
Qty: 90 TABLET | Refills: 2 | Status: SHIPPED | OUTPATIENT
Start: 2020-11-30

## 2020-11-30 RX ORDER — METOPROLOL SUCCINATE 25 MG/1
25 TABLET, EXTENDED RELEASE ORAL DAILY
Qty: 90 TABLET | Refills: 2 | Status: SHIPPED | OUTPATIENT
Start: 2020-11-30

## 2021-01-09 ENCOUNTER — APPOINTMENT (OUTPATIENT)
Dept: GENERAL RADIOLOGY | Facility: HOSPITAL | Age: 71
End: 2021-01-09
Attending: EMERGENCY MEDICINE
Payer: MEDICARE

## 2021-01-09 ENCOUNTER — HOSPITAL ENCOUNTER (EMERGENCY)
Facility: HOSPITAL | Age: 71
Discharge: HOME | End: 2021-01-09
Attending: EMERGENCY MEDICINE
Payer: MEDICARE

## 2021-01-09 ENCOUNTER — APPOINTMENT (OUTPATIENT)
Dept: GENERAL RADIOLOGY | Age: 71
End: 2021-01-09
Attending: NURSE PRACTITIONER
Payer: MEDICARE

## 2021-01-09 ENCOUNTER — APPOINTMENT (OUTPATIENT)
Dept: ULTRASOUND IMAGING | Facility: HOSPITAL | Age: 71
End: 2021-01-09
Attending: EMERGENCY MEDICINE
Payer: MEDICARE

## 2021-01-09 ENCOUNTER — HOSPITAL ENCOUNTER (OUTPATIENT)
Age: 71
Discharge: ANOTHER HOSPITAL | End: 2021-01-09
Payer: MEDICARE

## 2021-01-09 ENCOUNTER — OFFICE VISIT (OUTPATIENT)
Dept: FAMILY MEDICINE CLINIC | Facility: CLINIC | Age: 71
End: 2021-01-09

## 2021-01-09 VITALS
OXYGEN SATURATION: 97 % | RESPIRATION RATE: 18 BRPM | SYSTOLIC BLOOD PRESSURE: 145 MMHG | HEIGHT: 70 IN | BODY MASS INDEX: 34.36 KG/M2 | HEART RATE: 61 BPM | DIASTOLIC BLOOD PRESSURE: 91 MMHG | TEMPERATURE: 97 F | WEIGHT: 240 LBS

## 2021-01-09 VITALS
TEMPERATURE: 99 F | RESPIRATION RATE: 18 BRPM | SYSTOLIC BLOOD PRESSURE: 154 MMHG | HEART RATE: 60 BPM | DIASTOLIC BLOOD PRESSURE: 96 MMHG | OXYGEN SATURATION: 95 %

## 2021-01-09 DIAGNOSIS — Z02.9 ADMINISTRATIVE ENCOUNTER: Primary | ICD-10-CM

## 2021-01-09 DIAGNOSIS — M25.511 SHOULDER PAIN, RIGHT: Primary | ICD-10-CM

## 2021-01-09 DIAGNOSIS — M54.9 ACUTE UPPER BACK PAIN: Primary | ICD-10-CM

## 2021-01-09 PROBLEM — R07.9 ACUTE CHEST PAIN: Status: ACTIVE | Noted: 2021-01-09

## 2021-01-09 PROBLEM — R07.89 ATYPICAL CHEST PAIN: Status: ACTIVE | Noted: 2019-01-14

## 2021-01-09 LAB
ALBUMIN SERPL-MCNC: 4 G/DL (ref 3.4–5)
ALP LIVER SERPL-CCNC: 41 U/L
ALT SERPL-CCNC: 35 U/L
ANION GAP SERPL CALC-SCNC: 4 MMOL/L
ANION GAP SERPL CALC-SCNC: 4 MMOL/L (ref 0–18)
AST SERPL-CCNC: 15 U/L (ref 15–37)
BASOPHILS # BLD AUTO: 0.03 X10(3) UL (ref 0–0.2)
BASOPHILS NFR BLD AUTO: 0.4 %
BILIRUB DIRECT SERPL-MCNC: 0.1 MG/DL (ref 0–0.2)
BILIRUB SERPL-MCNC: 0.3 MG/DL (ref 0.1–2)
BUN BLD-MCNC: 15 MG/DL (ref 7–18)
BUN SERPL-MCNC: 15 MG/DL
BUN/CREAT SERPL: 14.7
BUN/CREAT SERPL: 14.7 (ref 10–20)
CALCIUM BLD-MCNC: 9.6 MG/DL (ref 8.5–10.1)
CALCIUM SERPL-MCNC: 9.6 MG/DL
CHLORIDE SERPL-SCNC: 111 MMOL/L
CHLORIDE SERPL-SCNC: 111 MMOL/L (ref 98–112)
CO2 SERPL-SCNC: 26 MMOL/L
CO2 SERPL-SCNC: 26 MMOL/L (ref 21–32)
CREAT BLD-MCNC: 1.02 MG/DL
CREAT SERPL-MCNC: 1.02 MG/DL
DEPRECATED RDW RBC AUTO: 45.7 FL (ref 35.1–46.3)
EOSINOPHIL # BLD AUTO: 0.2 X10(3) UL (ref 0–0.7)
EOSINOPHIL NFR BLD AUTO: 2.9 %
ERYTHROCYTE [DISTWIDTH] IN BLOOD BY AUTOMATED COUNT: 14.2 % (ref 11–15)
GLUCOSE BLD-MCNC: 107 MG/DL (ref 70–99)
GLUCOSE SERPL-MCNC: 107 MG/DL
HAV IGM SER QL: 2.1 MG/DL (ref 1.6–2.6)
HCT VFR BLD AUTO: 42.7 %
HCT VFR BLD CALC: 42.7 %
HGB BLD-MCNC: 14.4 G/DL
HGB BLD-MCNC: 14.4 G/DL
IMM GRANULOCYTES # BLD AUTO: 0.03 X10(3) UL (ref 0–1)
IMM GRANULOCYTES NFR BLD: 0.4 %
LIPASE SERPL-CCNC: 173 U/L (ref 73–393)
LYMPHOCYTES # BLD AUTO: 2.44 X10(3) UL (ref 1–4)
LYMPHOCYTES NFR BLD AUTO: 35 %
M PROTEIN MFR SERPL ELPH: 7.3 G/DL (ref 6.4–8.2)
MCH RBC QN AUTO: 29.6 PG (ref 26–34)
MCHC RBC AUTO-ENTMCNC: 33.7 G/DL (ref 31–37)
MCV RBC AUTO: 87.7 FL
MONOCYTES # BLD AUTO: 0.65 X10(3) UL (ref 0.1–1)
MONOCYTES NFR BLD AUTO: 9.3 %
NEUTROPHILS # BLD AUTO: 3.63 X10 (3) UL (ref 1.5–7.7)
NEUTROPHILS # BLD AUTO: 3.63 X10(3) UL (ref 1.5–7.7)
NEUTROPHILS NFR BLD AUTO: 52 %
OSMOLALITY SERPL CALC.SUM OF ELEC: 293 MOSM/KG (ref 275–295)
PLATELET # BLD AUTO: 220 10(3)UL (ref 150–450)
PLATELET # BLD: 220 K/MCL
POTASSIUM SERPL-SCNC: 4 MMOL/L
POTASSIUM SERPL-SCNC: 4 MMOL/L (ref 3.5–5.1)
RBC # BLD AUTO: 4.87 X10(6)UL
RBC # BLD: 4.87 10*6/UL
SARS-COV-2 RNA RESP QL NAA+PROBE: NOT DETECTED
SODIUM SERPL-SCNC: 141 MMOL/L
SODIUM SERPL-SCNC: 141 MMOL/L (ref 136–145)
TROPONIN I SERPL-MCNC: <0.045 NG/ML (ref ?–0.04)
TROPONIN I SERPL-MCNC: <0.045 NG/ML (ref ?–0.04)
WBC # BLD AUTO: 7 X10(3) UL (ref 4–11)
WBC # BLD: 7 K/MCL

## 2021-01-09 PROCEDURE — 93005 ELECTROCARDIOGRAM TRACING: CPT

## 2021-01-09 PROCEDURE — 99285 EMERGENCY DEPT VISIT HI MDM: CPT

## 2021-01-09 PROCEDURE — 84484 ASSAY OF TROPONIN QUANT: CPT | Performed by: EMERGENCY MEDICINE

## 2021-01-09 PROCEDURE — 93010 ELECTROCARDIOGRAM REPORT: CPT | Performed by: EMERGENCY MEDICINE

## 2021-01-09 PROCEDURE — 76705 ECHO EXAM OF ABDOMEN: CPT | Performed by: EMERGENCY MEDICINE

## 2021-01-09 PROCEDURE — 96374 THER/PROPH/DIAG INJ IV PUSH: CPT

## 2021-01-09 PROCEDURE — 93000 ELECTROCARDIOGRAM COMPLETE: CPT | Performed by: NURSE PRACTITIONER

## 2021-01-09 PROCEDURE — 83690 ASSAY OF LIPASE: CPT | Performed by: EMERGENCY MEDICINE

## 2021-01-09 PROCEDURE — 83735 ASSAY OF MAGNESIUM: CPT | Performed by: EMERGENCY MEDICINE

## 2021-01-09 PROCEDURE — 99205 OFFICE O/P NEW HI 60 MIN: CPT | Performed by: NURSE PRACTITIONER

## 2021-01-09 PROCEDURE — 80048 BASIC METABOLIC PNL TOTAL CA: CPT | Performed by: EMERGENCY MEDICINE

## 2021-01-09 PROCEDURE — 71045 X-RAY EXAM CHEST 1 VIEW: CPT | Performed by: EMERGENCY MEDICINE

## 2021-01-09 PROCEDURE — 80076 HEPATIC FUNCTION PANEL: CPT | Performed by: EMERGENCY MEDICINE

## 2021-01-09 PROCEDURE — 99214 OFFICE O/P EST MOD 30 MIN: CPT | Performed by: INTERNAL MEDICINE

## 2021-01-09 PROCEDURE — 85025 COMPLETE CBC W/AUTO DIFF WBC: CPT | Performed by: EMERGENCY MEDICINE

## 2021-01-09 RX ORDER — ORPHENADRINE CITRATE 100 MG/1
100 TABLET, EXTENDED RELEASE ORAL 2 TIMES DAILY
Qty: 20 TABLET | Refills: 0 | Status: SHIPPED | OUTPATIENT
Start: 2021-01-09 | End: 2021-01-16

## 2021-01-09 RX ORDER — NITROGLYCERIN 0.4 MG/1
0.4 TABLET SUBLINGUAL ONCE
Status: COMPLETED | OUTPATIENT
Start: 2021-01-09 | End: 2021-01-09

## 2021-01-09 RX ORDER — ORPHENADRINE CITRATE 30 MG/ML
30 INJECTION INTRAMUSCULAR; INTRAVENOUS ONCE
Status: COMPLETED | OUTPATIENT
Start: 2021-01-09 | End: 2021-01-09

## 2021-01-09 RX ORDER — ASPIRIN 81 MG/1
324 TABLET, CHEWABLE ORAL ONCE
Status: COMPLETED | OUTPATIENT
Start: 2021-01-09 | End: 2021-01-09

## 2021-01-09 RX ORDER — NITROGLYCERIN 0.4 MG/1
TABLET SUBLINGUAL
Status: COMPLETED
Start: 2021-01-09 | End: 2021-01-09

## 2021-01-09 NOTE — ED PROVIDER NOTES
Patient Seen in: [facility] AND [facility] Emergency Department      History   Patient presents with:  Shoulder Pain    Stated Complaint: atraumatic shoulder pain    HPI/Subjective:   HPI    56-year-old male presents for evaluation for right scapular pain.   This OTHER SURGICAL HISTORY  2010    Per NG: septo; turb. red; smr of turbs.    • TONSILLECTOMY     • TOTAL KNEE REPLACEMENT Right 02/27/2020   • UPPER GI ENDOSCOPY,BIOPSY                  Social History    Tobacco Use      Smoking status: Former Smoker        P regular rhythm. Pulses: Normal pulses. Radial pulses are 2+ on the right side and 2+ on the left side. Dorsalis pedis pulses are 2+ on the right side and 2+ on the left side.         Posterior tibial pulses are 2+ on the right side and PLATELET    Narrative: The following orders were created for panel order CBC WITH DIFFERENTIAL WITH PLATELET.   Procedure                               Abnormality         Status                     ---------                               ----------- Normal.  Normal size. No calculi, wall thickening or pericholecystic fluid. Sonographic Heard's sign was not elicited. BILE DUCTS:   Normal.  Common bile duct measures 5 mm.   OTHER:   The visualized portion of the liver has a heterogeneous echotexture understanding and agreement with the treatment plan. All questions were addressed and answered.      Total Critical Care Time: 35 minutes including time spent examining and re-evaluating the patient, ordering and reviewing laboratory tests, documenting, rev

## 2021-01-09 NOTE — ED PROVIDER NOTES
Patient presents with:  Arm or Hand Injury      HPI:     This 65year old male presents with a chief complaint of right-sided upper posterior shoulder pain and upper back pain that started 5 days ago.   The patient states he was taking down [place] lights patient/caregiver and is not pertinent to presenting problem.   Social History    Socioeconomic History      Marital status:       Spouse name: Not on file      Number of children: Not on file      Years of education: Not on file      Highest educati Weight Concern: Not Asked        Special Diet: Not Asked        Back Care: Not Asked        Exercise: Yes          twice a week        Bike Helmet: Not Asked        Seat Belt: Not Asked        Self-Exams: Not Asked    Social History Narrative      Not o An EKG was done. No ischemic changes on the EKG. Based on the patient's symptoms, the pain not being reproducible with movement, and the patient's history, I will send him to the emergency department for further evaluation and testing.   He agrees with t

## 2021-01-09 NOTE — ED INITIAL ASSESSMENT (HPI)
7/12/19 please return call/blm Atraumatic right shoulder pain began Monday night. Pt reports improvement with tylenol with pain returning some time later. Pt reports taking down decorations earlier in the day but does not recall an injury.

## 2021-01-09 NOTE — ED INITIAL ASSESSMENT (HPI)
Pt presents to the IC with c/o right shoulder blade pain s/p pulling xmas decorations down on Monday. Pt took tylenol originally which improved the pain slightly but it gets worse every night. Denies chest pain or sob. Pain radiates down the right elbow.

## 2021-01-09 NOTE — PROGRESS NOTES
Patient with R shoulder pain for a few days s/p taking down [place] decorations. States pain is becoming unbearable. Tried Tylenol and one of his wife's muscle relaxers which did not help with pain.   Due to refractory pain to common [facility] tx protocols pa

## 2021-01-10 NOTE — CONSULTS
[facility]    Report of Consultation    Renée Clarencezana Patient Status:  Emergency    3/29/1950 MRN [id number]   Location [address] Attending Girish Ariza [facility] Day # 0 PCP Roseann Poole MD recurrent. It would temporarily improve with Tylenol but since it would come back he came to the ER for evaluation. EKG and initial troponin do not show acute findings. Pain is different than his prior chest pain which was on the left side.   He states h • Cancer Mother 67        Per NG: leukemia ( cause of death)   • Other (Other) Maternal Grandmother    • Other (Other) Maternal Grandfather    • Other (Other) Paternal Grandmother    • Other (Other) Paternal Grandfather    • Kidney Disease Brother 7.0 01/09/2021    HGB 14.4 01/09/2021    HCT 42.7 01/09/2021    .0 01/09/2021    CREATSERUM 1.02 01/09/2021    BUN 15 01/09/2021     01/09/2021    K 4.0 01/09/2021     01/09/2021    CO2 26.0 01/09/2021     (H) 01/09/2021    CA 9.6

## 2021-01-10 NOTE — ED NOTES
.Orders for admission, patient is aware of plan and ready to go upstairs. Any questions, please call ED RN Derek Arce  at [address]    Type of COVID test sent: rapid  COVID Suspicion level: Low    Titratable drug(s) infusing: none  Rate: n/a    LOC at ti

## 2021-01-11 ENCOUNTER — OFFICE VISIT (OUTPATIENT)
Dept: CARDIOLOGY | Age: 71
End: 2021-01-11

## 2021-01-11 VITALS
DIASTOLIC BLOOD PRESSURE: 76 MMHG | SYSTOLIC BLOOD PRESSURE: 124 MMHG | HEART RATE: 66 BPM | BODY MASS INDEX: 36.14 KG/M2 | HEIGHT: 69 IN | OXYGEN SATURATION: 98 % | WEIGHT: 244 LBS

## 2021-01-11 DIAGNOSIS — I25.10 CORONARY ARTERY DISEASE INVOLVING NATIVE CORONARY ARTERY OF NATIVE HEART WITHOUT ANGINA PECTORIS: ICD-10-CM

## 2021-01-11 DIAGNOSIS — E78.49 OTHER HYPERLIPIDEMIA: ICD-10-CM

## 2021-01-11 DIAGNOSIS — R07.82 INTERCOSTAL PAIN: Primary | ICD-10-CM

## 2021-01-11 DIAGNOSIS — I10 ESSENTIAL HYPERTENSION: ICD-10-CM

## 2021-01-11 PROCEDURE — 99213 OFFICE O/P EST LOW 20 MIN: CPT | Performed by: INTERNAL MEDICINE

## 2021-01-11 RX ORDER — ORPHENADRINE CITRATE 100 MG/1
100 TABLET, EXTENDED RELEASE ORAL 2 TIMES DAILY
COMMUNITY
Start: 2021-01-09 | End: 2021-01-16

## 2021-01-11 ASSESSMENT — PATIENT HEALTH QUESTIONNAIRE - PHQ9
1. LITTLE INTEREST OR PLEASURE IN DOING THINGS: NOT AT ALL
2. FEELING DOWN, DEPRESSED OR HOPELESS: NOT AT ALL
SUM OF ALL RESPONSES TO PHQ9 QUESTIONS 1 AND 2: 0
CLINICAL INTERPRETATION OF PHQ2 SCORE: NO FURTHER SCREENING NEEDED
CLINICAL INTERPRETATION OF PHQ9 SCORE: NO FURTHER SCREENING NEEDED
SUM OF ALL RESPONSES TO PHQ9 QUESTIONS 1 AND 2: 0

## 2021-01-12 ENCOUNTER — MED REC SCAN ONLY (OUTPATIENT)
Dept: INTERNAL MEDICINE CLINIC | Facility: CLINIC | Age: 71
End: 2021-01-12

## 2021-01-29 DIAGNOSIS — Z23 NEED FOR VACCINATION: ICD-10-CM

## 2021-02-13 ENCOUNTER — IMMUNIZATION (OUTPATIENT)
Dept: LAB | Age: 71
End: 2021-02-13
Attending: HOSPITALIST
Payer: MEDICARE

## 2021-02-13 DIAGNOSIS — Z23 NEED FOR VACCINATION: Primary | ICD-10-CM

## 2021-02-13 PROCEDURE — 0001A SARSCOV2 VAC 30MCG/0.3ML IM: CPT

## 2021-02-24 DIAGNOSIS — E78.2 MIXED HYPERLIPIDEMIA: ICD-10-CM

## 2021-02-24 RX ORDER — ATORVASTATIN CALCIUM 20 MG/1
TABLET, FILM COATED ORAL
Qty: 90 TABLET | Refills: 0 | Status: SHIPPED | OUTPATIENT
Start: 2021-02-24 | End: 2021-05-25

## 2021-03-06 ENCOUNTER — IMMUNIZATION (OUTPATIENT)
Dept: LAB | Age: 71
End: 2021-03-06
Attending: HOSPITALIST
Payer: MEDICARE

## 2021-03-06 DIAGNOSIS — Z23 NEED FOR VACCINATION: Primary | ICD-10-CM

## 2021-03-06 PROCEDURE — 0002A SARSCOV2 VAC 30MCG/0.3ML IM: CPT

## 2021-03-11 ENCOUNTER — OFFICE VISIT (OUTPATIENT)
Dept: INTERNAL MEDICINE CLINIC | Facility: CLINIC | Age: 71
End: 2021-03-11
Payer: MEDICARE

## 2021-03-11 ENCOUNTER — HOSPITAL ENCOUNTER (OUTPATIENT)
Dept: GENERAL RADIOLOGY | Facility: HOSPITAL | Age: 71
Discharge: HOME | End: 2021-03-11
Attending: INTERNAL MEDICINE
Payer: MEDICARE

## 2021-03-11 VITALS
BODY MASS INDEX: 37.31 KG/M2 | DIASTOLIC BLOOD PRESSURE: 80 MMHG | WEIGHT: 260.63 LBS | SYSTOLIC BLOOD PRESSURE: 142 MMHG | HEIGHT: 70 IN | HEART RATE: 71 BPM

## 2021-03-11 DIAGNOSIS — Z99.89 OBSTRUCTIVE SLEEP APNEA ON CPAP: ICD-10-CM

## 2021-03-11 DIAGNOSIS — I10 ESSENTIAL HYPERTENSION: Chronic | ICD-10-CM

## 2021-03-11 DIAGNOSIS — E11.9 TYPE 2 DIABETES MELLITUS WITHOUT COMPLICATION, WITHOUT LONG-TERM CURRENT USE OF INSULIN (HCC): ICD-10-CM

## 2021-03-11 DIAGNOSIS — M25.531 RIGHT WRIST PAIN: ICD-10-CM

## 2021-03-11 DIAGNOSIS — G47.33 OBSTRUCTIVE SLEEP APNEA ON CPAP: ICD-10-CM

## 2021-03-11 DIAGNOSIS — I25.9 CHRONIC ISCHEMIC HEART DISEASE: ICD-10-CM

## 2021-03-11 DIAGNOSIS — E78.2 MIXED HYPERLIPIDEMIA: ICD-10-CM

## 2021-03-11 DIAGNOSIS — Z00.00 MEDICARE ANNUAL WELLNESS VISIT, SUBSEQUENT: Primary | ICD-10-CM

## 2021-03-11 PROBLEM — I25.10 CAD (CORONARY ARTERY DISEASE): Chronic | Status: RESOLVED | Noted: 2020-02-27 | Resolved: 2021-03-11

## 2021-03-11 LAB
CARTRIDGE LOT#: ABNORMAL NUMERIC
HEMOGLOBIN A1C: 6.3 % (ref 4.3–5.6)

## 2021-03-11 PROCEDURE — 99214 OFFICE O/P EST MOD 30 MIN: CPT | Performed by: INTERNAL MEDICINE

## 2021-03-11 PROCEDURE — 36416 COLLJ CAPILLARY BLOOD SPEC: CPT | Performed by: INTERNAL MEDICINE

## 2021-03-11 PROCEDURE — 83036 HEMOGLOBIN GLYCOSYLATED A1C: CPT | Performed by: INTERNAL MEDICINE

## 2021-03-11 PROCEDURE — G0439 PPPS, SUBSEQ VISIT: HCPCS | Performed by: INTERNAL MEDICINE

## 2021-03-11 PROCEDURE — 73110 X-RAY EXAM OF WRIST: CPT | Performed by: INTERNAL MEDICINE

## 2021-03-11 RX ORDER — FENOFIBRATE 160 MG/1
160 TABLET ORAL EVERY EVENING
Qty: 90 TABLET | Refills: 1 | Status: SHIPPED | OUTPATIENT
Start: 2021-03-11 | End: 2021-11-18

## 2021-03-11 RX ORDER — LOSARTAN POTASSIUM 50 MG/1
50 TABLET ORAL DAILY
Qty: 90 TABLET | Refills: 1 | Status: SHIPPED | OUTPATIENT
Start: 2021-03-11 | End: 2021-08-14

## 2021-03-11 NOTE — PATIENT INSTRUCTIONS
Prince theodore Rosas's SCREENING SCHEDULE   Tests on this list are recommended by your physician but may not be covered, or covered at this frequency, by your insurer. Please check with your insurance carrier before scheduling to verify coverage.     PREVENTATI every 10 years- more often if abnormal Colonoscopy due on 04/11/2028 Update [facility] if applicable    Flex Sigmoidoscopy Screen  Covered every 5 years No results found for this or any previous visit. No flowsheet data found.      Fecal Occult Bloo found for this or any previous visit. This may be covered with your prescription benefits, but Medicare does not cover unless Medically needed    Zoster (Not covered by Medicare Part B) No orders found for this or any previous visit.  This may be covered wi

## 2021-03-11 NOTE — PROGRESS NOTES
HPI:   Yasmine Davey is a 65year old male who presents for a Medicare Subsequent Annual Wellness visit (Pt already had Initial Annual Wellness). HPI:  Pt is here for f/u regarding his diabetes. He saw the eye doctor 2 weeks ago.   He has been having p Jagdish Mike DO (Physical Medicine)    Patient Active Problem List:     Impotence of organic origin     Osteoarthritis     Mixed hyperlipidemia     Chronic allergic rhinitis due to pollen     Prostate nodule     Ex-smoker     Elevated PSA     Medicare annual wel twice daily for 3 days--start 24 hours before biopsy  cyclobenzaprine 5 MG Oral Tab, TK 1 T PO Q 8 H PRN  Clopidogrel Bisulfate 75 MG Oral Tab, TK 1 T PO  QD. Multiple Vitamins-Minerals (MULTIVITAL) Oral Tab, Take 1 tablet by mouth daily.   Metoprolol Succ drip and sore throat. Eyes: Negative for pain and visual disturbance. Respiratory: Negative for cough, shortness of breath and wheezing. Cardiovascular: Negative for chest pain and palpitations.    Gastrointestinal: Negative for abdominal pain, bloo Yes People get annoyed because I misunderstand what they say: Yes   I misunderstand what others are saying and make inappropriate responses: Yes I avoid social activities because I cannot hear well and fear I will reply improperly: Yes   Family members and Cervical: No cervical adenopathy. Skin:     General: Skin is warm and dry. Neurological:      Mental Status: He is alert and oriented to person, place, and time. Cranial Nerves: No cranial nerve deficit.    Psychiatric:         Behavior: Behavior n Relevant Medications    Fenofibrate 160 MG Oral Tab    Other Relevant Orders    LIPID PANEL    Chronic ischemic heart disease     Stable. Continue present management. Obstructive sleep apnea on CPAP     Controlled. Continue present management. Colorectal Cancer Screening      Colonoscopy Screen every 10 years Colonoscopy due on 04/11/2028 Update [facility] if applicable    Flex Sigmoidoscopy Screen every 10 years No results found for this or any previous visit. No flowsheet data found. 09/02/2016 4.2    No flowsheet data found. Creatinine  Annually Creatinine (mg/dL)   Date Value   01/09/2021 1.02    No flowsheet data found. Drug Serum Conc  Annually No results found for: DIGOXIN, DIG, VALP No flowsheet data found.        Diabetes

## 2021-03-12 NOTE — ASSESSMENT & PLAN NOTE
This is likely due to arthritis. We will check an x-ray.   He will continue to follow-up with the orthopedist.

## 2021-03-12 NOTE — ASSESSMENT & PLAN NOTE
Patient's blood pressure is high. We will continue the metoprolol and add losartan. Follow-up in 3 months.

## 2021-03-15 ENCOUNTER — TELEPHONE (OUTPATIENT)
Dept: INTERNAL MEDICINE CLINIC | Facility: CLINIC | Age: 71
End: 2021-03-15

## 2021-03-15 NOTE — TELEPHONE ENCOUNTER
Ok to speak with patient's wife per JACY. I called and spoke with Theador Glatter. She wanted to make sure he is to take all of the below listed medications. Reviewed LOV note with Dr. Tien Lee:    Mixed hyperlipidemia        Controlled.   Continue present manageme

## 2021-03-15 NOTE — TELEPHONE ENCOUNTER
Per wife of patient, patient just saw doctor last week but need to know why patient has 2 Cholesterol medication and 2 Blood pressure pills. Wife need to know how patient need to take this medication or do patient need to take all of this med?   Caroline Parisi

## 2021-04-12 ENCOUNTER — OFFICE VISIT (OUTPATIENT)
Dept: SURGERY | Facility: CLINIC | Age: 71
End: 2021-04-12
Payer: MEDICARE

## 2021-04-12 ENCOUNTER — LAB ENCOUNTER (OUTPATIENT)
Dept: LAB | Facility: HOSPITAL | Age: 71
End: 2021-04-12
Attending: UROLOGY
Payer: MEDICARE

## 2021-04-12 VITALS
HEART RATE: 66 BPM | BODY MASS INDEX: 35.55 KG/M2 | SYSTOLIC BLOOD PRESSURE: 115 MMHG | HEIGHT: 69 IN | DIASTOLIC BLOOD PRESSURE: 71 MMHG | WEIGHT: 240 LBS

## 2021-04-12 DIAGNOSIS — R35.1 BENIGN PROSTATIC HYPERPLASIA WITH NOCTURIA: ICD-10-CM

## 2021-04-12 DIAGNOSIS — Z79.82 LONG TERM CURRENT USE OF ASPIRIN: ICD-10-CM

## 2021-04-12 DIAGNOSIS — Z79.02 LONG TERM CURRENT USE OF CLOPIDOGREL: ICD-10-CM

## 2021-04-12 DIAGNOSIS — R97.20 ELEVATED PSA: ICD-10-CM

## 2021-04-12 DIAGNOSIS — N40.1 BENIGN PROSTATIC HYPERPLASIA WITH NOCTURIA: ICD-10-CM

## 2021-04-12 DIAGNOSIS — N40.2 PROSTATE NODULE: ICD-10-CM

## 2021-04-12 DIAGNOSIS — N52.01 ERECTILE DYSFUNCTION DUE TO ARTERIAL INSUFFICIENCY: ICD-10-CM

## 2021-04-12 DIAGNOSIS — R35.1 NOCTURIA: ICD-10-CM

## 2021-04-12 DIAGNOSIS — R97.20 ELEVATED PSA: Primary | ICD-10-CM

## 2021-04-12 PROCEDURE — 84153 ASSAY OF PSA TOTAL: CPT

## 2021-04-12 PROCEDURE — 81003 URINALYSIS AUTO W/O SCOPE: CPT

## 2021-04-12 PROCEDURE — 84154 ASSAY OF PSA FREE: CPT

## 2021-04-12 PROCEDURE — 99213 OFFICE O/P EST LOW 20 MIN: CPT | Performed by: UROLOGY

## 2021-04-12 PROCEDURE — 36415 COLL VENOUS BLD VENIPUNCTURE: CPT

## 2021-04-12 NOTE — PATIENT INSTRUCTIONS
Alesia Schaefer M.D.    1.   Blood draw for PSA--total and free and urine specimen for complete urinalysis today--we will notify you of the result    2.   If today's PSA total comes back higher than 8.64,   Return in

## 2021-04-12 NOTE — PROGRESS NOTES
HPI:    Patient ID: Tali Prakash is a 66year old male. HPI     Elevated PSA   Chronic. Problem started 12/06/2011 when PSA = 4.5. History of 5 negative prostate biopsies; most recent 10/07/2020.  Most recent 08/24/2020 PSA = 8.64, 38% free PSA (28.3% dysuria. Patient is not currently taking any  medication. The patient feels that the voiding dysfunction is stable compared to last time. Patient states he feels \"mostly satisfied\" about his urinating problem.  He drinks 12 oz of water before bedtime, On JOSE CARLOS 2/08/2019, prostate 2+ enlarged--had one nodule the size of a lima bean on right lobe; PSA ordered; continue anticoagulants   08/12/2019 Office visit with me;  On JOSE CARLOS, prostate 2+ enlarged, prostate nodule the size of a lima bean on right lobe; abel Oral Tablet 24 Hr Take 0.5 tablets (12.5 mg total) by mouth Daily Beta Blocker.  (Patient taking differently: Take 25 mg by mouth Daily Beta Blocker.  ) 30 tablet 5     Allergies:No Known Allergies    HISTORY:  Past Medical History:   Diagnosis Date   • All Per NG: Renal disease      Social History: Social History    Tobacco Use      Smoking status: Former Smoker        Packs/day: 1.00        Years: 20.00        Pack years: 20        Types: Cigars        Quit date: 7/3/2017        Years since quitting: 10/08/2015 UA RBC = 22; WBC = 3  08/10/2015 PSA = 5.0  12/16/2014 PSA = 3.6  08/11/2014 PSA = 3.0   03/11/2014 PSA = 4.8  06/17/2013 PSA = 3.6   12/31/2012 PSA = 2.6   06/07/2012 PSA = 2.5   12/06/2011 PSA = 4.5   06/10/2011 PSA = 2.9         UROLOGICAL the transition zone, at the level of prostatic base, there is an ill-defined, T2 hypointense lesion measuring 0.9 x 1.4 x 1.3 cm; PI-RADS 4 - High (clinically significant cancer is likely to be present); there is a morphologically suspicious poorly defined chooses to continue observation.      (N52.01) Erectile dysfunction due to arterial insufficiency  Chronic. Patient has taken Cialis 20 mg and Viagra with little to no improvement. He is not currently taking any medication for this.  We discussed Caverject want to see you in 6 months with blood draw PSA total and free 1--10 days before visit. 3.  Please remember to avoid any and all sexual stimulation for 5 days before any blood draw for PSA.        Orders Placed This Encounter      PSA, Total W Reflex To

## 2021-05-17 DIAGNOSIS — E11.9 TYPE 2 DIABETES MELLITUS WITHOUT COMPLICATION, WITHOUT LONG-TERM CURRENT USE OF INSULIN (HCC): ICD-10-CM

## 2021-05-17 RX ORDER — METFORMIN HYDROCHLORIDE 500 MG/1
TABLET, EXTENDED RELEASE ORAL
Qty: 180 TABLET | Refills: 1 | Status: SHIPPED | OUTPATIENT
Start: 2021-05-17 | End: 2021-11-11

## 2021-05-25 ENCOUNTER — PATIENT MESSAGE (OUTPATIENT)
Dept: INTERNAL MEDICINE CLINIC | Facility: CLINIC | Age: 71
End: 2021-05-25

## 2021-05-25 DIAGNOSIS — E78.2 MIXED HYPERLIPIDEMIA: ICD-10-CM

## 2021-05-25 RX ORDER — ATORVASTATIN CALCIUM 20 MG/1
20 TABLET, FILM COATED ORAL DAILY
Qty: 90 TABLET | Refills: 1 | Status: SHIPPED | OUTPATIENT
Start: 2021-05-25 | End: 2021-11-18

## 2021-05-25 NOTE — TELEPHONE ENCOUNTER
From: Jonah Burton  To: Raymond Nunez MD  Sent: 5/25/2021 10:36 AM CDT  Subject: Non-Urgent Medical Question    I have a appointment Ryanne 3, should I get a blood test and if so do I need to fast?  Thanks,  [employer]

## 2021-05-28 ENCOUNTER — LAB ENCOUNTER (OUTPATIENT)
Dept: LAB | Age: 71
End: 2021-05-28
Attending: INTERNAL MEDICINE
Payer: MEDICARE

## 2021-05-28 DIAGNOSIS — E78.2 MIXED HYPERLIPIDEMIA: ICD-10-CM

## 2021-05-28 DIAGNOSIS — E11.9 TYPE 2 DIABETES MELLITUS WITHOUT COMPLICATION, WITHOUT LONG-TERM CURRENT USE OF INSULIN (HCC): ICD-10-CM

## 2021-05-28 DIAGNOSIS — I10 ESSENTIAL HYPERTENSION: ICD-10-CM

## 2021-05-28 PROCEDURE — 36415 COLL VENOUS BLD VENIPUNCTURE: CPT

## 2021-05-28 PROCEDURE — 85025 COMPLETE CBC W/AUTO DIFF WBC: CPT

## 2021-05-28 PROCEDURE — 82043 UR ALBUMIN QUANTITATIVE: CPT

## 2021-05-28 PROCEDURE — 82607 VITAMIN B-12: CPT

## 2021-05-28 PROCEDURE — 84443 ASSAY THYROID STIM HORMONE: CPT

## 2021-05-28 PROCEDURE — 80061 LIPID PANEL: CPT

## 2021-05-28 PROCEDURE — 82570 ASSAY OF URINE CREATININE: CPT

## 2021-05-28 PROCEDURE — 80053 COMPREHEN METABOLIC PANEL: CPT

## 2021-06-08 ENCOUNTER — MED REC SCAN ONLY (OUTPATIENT)
Dept: INTERNAL MEDICINE CLINIC | Facility: CLINIC | Age: 71
End: 2021-06-08

## 2021-08-05 ENCOUNTER — OFFICE VISIT (OUTPATIENT)
Dept: INTERNAL MEDICINE CLINIC | Facility: CLINIC | Age: 71
End: 2021-08-05
Payer: MEDICARE

## 2021-08-05 VITALS
WEIGHT: 237.63 LBS | SYSTOLIC BLOOD PRESSURE: 129 MMHG | HEART RATE: 67 BPM | DIASTOLIC BLOOD PRESSURE: 80 MMHG | HEIGHT: 70 IN | BODY MASS INDEX: 34.02 KG/M2

## 2021-08-05 DIAGNOSIS — I10 ESSENTIAL HYPERTENSION: Chronic | ICD-10-CM

## 2021-08-05 DIAGNOSIS — M25.531 RIGHT WRIST PAIN: ICD-10-CM

## 2021-08-05 DIAGNOSIS — E11.9 TYPE 2 DIABETES MELLITUS WITHOUT COMPLICATION, WITHOUT LONG-TERM CURRENT USE OF INSULIN (HCC): Primary | ICD-10-CM

## 2021-08-05 PROBLEM — R07.9 ACUTE CHEST PAIN: Status: RESOLVED | Noted: 2021-01-09 | Resolved: 2021-08-05

## 2021-08-05 PROBLEM — IMO0002 AT HIGH RISK FOR COMPLICATION OF COVID-19: Status: RESOLVED | Noted: 2020-06-17 | Resolved: 2021-08-05

## 2021-08-05 LAB
CARTRIDGE LOT#: 831 NUMERIC
HEMOGLOBIN A1C: 6.1 % (ref 4.3–5.6)

## 2021-08-05 PROCEDURE — 83036 HEMOGLOBIN GLYCOSYLATED A1C: CPT | Performed by: INTERNAL MEDICINE

## 2021-08-05 PROCEDURE — 99214 OFFICE O/P EST MOD 30 MIN: CPT | Performed by: INTERNAL MEDICINE

## 2021-08-05 RX ORDER — FLUOCINONIDE 0.5 MG/G
OINTMENT TOPICAL
COMMUNITY

## 2021-08-05 NOTE — PROGRESS NOTES
HPI:    Patient ID: Moncho Weber is a 66year old male. HPI:  Pt c/o right wrist pain for a few years. It is worsening. He complains of easy bruising on the Plavix and aspirin. He has a history of a stent. His blood pressure has been good.   He matias tablet (50 mg total) by mouth daily. 90 tablet 1   • ASPIRIN 81 OR Take 81 mg by mouth daily. Stopped 7x days prior to procedure 10/7     • cyclobenzaprine 5 MG Oral Tab TK 1 T PO Q 8 H PRN     • Clopidogrel Bisulfate 75 MG Oral Tab TK 1 T PO  QD.      • Mu is well-developed. Cardiovascular:      Rate and Rhythm: Normal rate and regular rhythm. Heart sounds: Normal heart sounds. No murmur heard. Pulmonary:      Effort: Pulmonary effort is normal. No respiratory distress.       Breath sounds: Normal

## 2021-08-14 DIAGNOSIS — I10 ESSENTIAL HYPERTENSION: Chronic | ICD-10-CM

## 2021-08-14 RX ORDER — LOSARTAN POTASSIUM 50 MG/1
TABLET ORAL
Qty: 90 TABLET | Refills: 1 | Status: SHIPPED | OUTPATIENT
Start: 2021-08-14 | End: 2022-02-07

## 2021-08-18 ENCOUNTER — APPOINTMENT (OUTPATIENT)
Dept: CARDIOLOGY | Age: 71
End: 2021-08-18

## 2021-08-25 ENCOUNTER — APPOINTMENT (OUTPATIENT)
Dept: CARDIOLOGY | Age: 71
End: 2021-08-25

## 2021-10-04 ENCOUNTER — PATIENT MESSAGE (OUTPATIENT)
Dept: SURGERY | Facility: CLINIC | Age: 71
End: 2021-10-04

## 2021-10-04 DIAGNOSIS — R97.20 ELEVATED PSA: Primary | ICD-10-CM

## 2021-10-11 ENCOUNTER — LAB ENCOUNTER (OUTPATIENT)
Dept: LAB | Age: 71
End: 2021-10-11
Attending: UROLOGY
Payer: MEDICARE

## 2021-10-11 ENCOUNTER — RX ONLY (RX ONLY)
Age: 71
End: 2021-10-11

## 2021-10-11 DIAGNOSIS — R97.20 ELEVATED PSA: ICD-10-CM

## 2021-10-11 PROCEDURE — 36415 COLL VENOUS BLD VENIPUNCTURE: CPT

## 2021-10-11 PROCEDURE — 84153 ASSAY OF PSA TOTAL: CPT

## 2021-10-11 PROCEDURE — 84154 ASSAY OF PSA FREE: CPT

## 2021-10-11 RX ORDER — FLUOCINONIDE 0.5 MG/G
OINTMENT TOPICAL
Qty: 60 | Refills: 0 | Status: ERX

## 2021-10-11 NOTE — TELEPHONE ENCOUNTER
Replied to patient, new order placed. No order in the system.      Future Appointments   Date Time Provider Prabha Lisa   10/13/2021  3:40 PM Jose Simental MD [facility]   12/2/2021  1:00 PM [facility]         Copy of L

## 2021-10-11 NOTE — TELEPHONE ENCOUNTER
From: Cintia Salomon  To: Macrina Talley MD  Sent: 10/4/2021 1:15 PM CDT  Subject: Up coming appointment     I have an appointment on October 13 for a follow up. Should I get a blood test prior to this appointment?   Thank you,  Kashmir Kirk

## 2021-10-13 ENCOUNTER — OFFICE VISIT (OUTPATIENT)
Dept: SURGERY | Facility: CLINIC | Age: 71
End: 2021-10-13
Payer: MEDICARE

## 2021-10-13 VITALS
WEIGHT: 235 LBS | HEIGHT: 70 IN | HEART RATE: 66 BPM | BODY MASS INDEX: 33.64 KG/M2 | DIASTOLIC BLOOD PRESSURE: 64 MMHG | SYSTOLIC BLOOD PRESSURE: 102 MMHG

## 2021-10-13 DIAGNOSIS — N52.01 ERECTILE DYSFUNCTION DUE TO ARTERIAL INSUFFICIENCY: ICD-10-CM

## 2021-10-13 DIAGNOSIS — R97.20 ELEVATED PSA: Primary | ICD-10-CM

## 2021-10-13 DIAGNOSIS — Z79.82 LONG TERM CURRENT USE OF ASPIRIN: ICD-10-CM

## 2021-10-13 DIAGNOSIS — R35.1 NOCTURIA: ICD-10-CM

## 2021-10-13 DIAGNOSIS — R35.1 BENIGN PROSTATIC HYPERPLASIA WITH NOCTURIA: ICD-10-CM

## 2021-10-13 DIAGNOSIS — N40.2 PROSTATE NODULE: ICD-10-CM

## 2021-10-13 DIAGNOSIS — N40.1 BENIGN PROSTATIC HYPERPLASIA WITH NOCTURIA: ICD-10-CM

## 2021-10-13 PROCEDURE — 99214 OFFICE O/P EST MOD 30 MIN: CPT | Performed by: UROLOGY

## 2021-10-13 NOTE — PROGRESS NOTES
HPI:    Patient ID: Shahnaz King is a 66year old male. HPI          Elevated PSA   Chronic. Problem started 12/06/2011 when PSA = 4.5. History of 5 negative prostate biopsies; most recent 10/07/2020.  Most recent 10/12/2021 PSA = 5.23, 9% free (70.3% currently taking any medication for this. The patient feels that the voiding dysfunction is stable compared to last time. Patient states he feels \"delighted\" about his urinating problem.     Anticoagulated   Chronic.  Patient states on 10/13/2021 that he right lobe; PSA ordered; continue anticoagulants   08/12/2019 Office visit with me;  On JOSE CARLOS, prostate 2+ enlarged, prostate nodule the size of a lima bean on right lobe; patient decides against prostate biopsy and wants to be re-evaluated at a later date (MULTIVITAL) Oral Tab Take 1 tablet by mouth daily.      • cyclobenzaprine 5 MG Oral Tab TK 1 T PO Q 8 H PRN (Patient not taking: Reported on 10/13/2021)     • Metoprolol Succinate ER 25 MG Oral Tablet 24 Hr Take 0.5 tablets (12.5 mg total) by mouth Daily B NG: Renal disease      Social History: Social History    Tobacco Use      Smoking status: Former Smoker        Packs/day: 1.00        Years: 20.00        Pack years: 20        Types: Cigars        Quit date: 7/3/2017        Years since quittin.2      S General: Skin is dry. Neurological:      Mental Status: He is alert and oriented to person, place, and time. Psychiatric:         Thought Content:  Thought content normal.        10/13/21  1606   BP: 102/64   Pulse: 66           LABORATORIES   10/12/202 posterolateral aspect of the prostatic apex; this does not have features that are suspicious for malignancy     01/14/2019 CT Chest Abdomen Dissect Set = kidneys NL; lymph nodes no enlarged lymph nodes; bones multilevel degenerative changes of the spine.  prostatitis. On JOSE CARLOS, prostate 2+ enlarge, 1 cm nodule on right side. Patient is no longer on clopidogrel  However is still taking aspirin 81 mg. We discussed 3T MRI of the prostate vs prostate biopsy vs re-evaluation PSA in 4-6 months.  I fully explained to alternatives, and I answered questions concerning them; patient understands all of this and decides to proceed with the following:       Treatment Plan & Patient Instructions       1. Please schedule 3T multiplanar MRI of the prostate.   Call [phone]

## 2021-11-05 ENCOUNTER — TELEPHONE (OUTPATIENT)
Dept: INTERNAL MEDICINE CLINIC | Facility: CLINIC | Age: 71
End: 2021-11-05

## 2021-11-05 DIAGNOSIS — I10 ESSENTIAL HYPERTENSION: Primary | ICD-10-CM

## 2021-11-05 DIAGNOSIS — I25.9 CHRONIC ISCHEMIC HEART DISEASE: ICD-10-CM

## 2021-11-05 DIAGNOSIS — E11.9 TYPE 2 DIABETES MELLITUS WITHOUT COMPLICATION, WITHOUT LONG-TERM CURRENT USE OF INSULIN (HCC): ICD-10-CM

## 2021-11-05 NOTE — TELEPHONE ENCOUNTER
Please fax the pre-op form to [place] and I will order the tests when I am there on Monday. Pt can do the tests next week.

## 2021-11-05 NOTE — TELEPHONE ENCOUNTER
Pre-op exam scheduled on 11/18/21 at 1:40 with DR. Del Castillo. Surgical clearance request/form received from Dr. Berkley Hickman.

## 2021-11-05 NOTE — TELEPHONE ENCOUNTER
Patient is scheduled for a physical on 12/2/21. Wants to know if he can get his medical clearance for his hand surgery scheduled for 12/8/21 or does he have to come in for a separate visit?  Wants to be able to get all required test done before 12/2/21 so w

## 2021-11-05 NOTE — TELEPHONE ENCOUNTER
FYI to PCP would you like to order pre-op testing for patient to complete prior to appointment   Future Appointments   Date Time Provider Prabha Gonzalez   11/11/2021  1:30 PM [address] [place] [address] MRI RM2 (3T WIDE) [facility]   11/18/2021  1:40 PM Areli Ambriz

## 2021-11-11 ENCOUNTER — HOSPITAL ENCOUNTER (OUTPATIENT)
Dept: MRI IMAGING | Facility: HOSPITAL | Age: 71
Discharge: HOME | End: 2021-11-11
Attending: UROLOGY
Payer: MEDICARE

## 2021-11-11 DIAGNOSIS — R97.20 ELEVATED PSA: ICD-10-CM

## 2021-11-11 DIAGNOSIS — N40.2 PROSTATE NODULE: ICD-10-CM

## 2021-11-11 DIAGNOSIS — E11.9 TYPE 2 DIABETES MELLITUS WITHOUT COMPLICATION, WITHOUT LONG-TERM CURRENT USE OF INSULIN (HCC): ICD-10-CM

## 2021-11-11 PROCEDURE — 72197 MRI PELVIS W/O & W/DYE: CPT | Performed by: UROLOGY

## 2021-11-11 PROCEDURE — A9575 INJ GADOTERATE MEGLUMI 0.1ML: HCPCS | Performed by: UROLOGY

## 2021-11-11 RX ORDER — METFORMIN HYDROCHLORIDE 500 MG/1
1000 TABLET, EXTENDED RELEASE ORAL
Qty: 180 TABLET | Refills: 1 | Status: SHIPPED | OUTPATIENT
Start: 2021-11-11 | End: 2022-03-17

## 2021-11-11 NOTE — TELEPHONE ENCOUNTER
Refill passed per [facility] protocol. Requested Prescriptions   Pending Prescriptions Disp Refills    METFORMIN HCL  MG Oral Tablet 24 Hr [Pharmacy Med Name: METFORMIN ER 500MG 24HR TABS] 180 tablet 1     Sig: TAKE 2 TABLETS(1000 MG) BY MOUTH DAILY WITH BREAKFAST        Diabetes Medication Protocol Passed - 11/11/2021  4:03 AM        Passed - Last A1C < 7.5 and within past 6 months     Lab Results   Component Value Date    A1C 6.1 (A) 08/05/2021               Passed - Appointment in past 6 or next 3 months        Passed - GFR Non- > 50     Lab Results   Component Value Date    GFRNAA 72 05/28/2021                 Passed - GFR in the past 12 months              Recent Outpatient Visits              4 weeks ago Elevated [facility] [place] [facility] for Tuyet Galvan MD    Office Visit    3 months ago Type 2 diabetes mellitus without complication, without long-term current use of insulin [facility])    [employer], Flavia Gimenez MD    Office Visit    7 months ago Elevated PSA    [facility] [place] for Tuyet Galvan MD    Office Visit    8 months ago [employer] annual wellness visit, subsequent    Rhianna Gonzalez MD    Office Visit    10 months ago Administrative encounter    [address] [facility]    Office Visit            Future Appointments         Provider Department Appt Notes    Today [facility] MRI RM2 (3T WIDE) [facility] MRI Pt's spouse called and scheduled appt for pt. ..NP    In 1 week Lisa Cardenas MD [facility], [facility]     In 3 weeks Lisa Cardenas MD [facility], [place], [employer]

## 2021-11-12 ENCOUNTER — LAB ENCOUNTER (OUTPATIENT)
Dept: LAB | Facility: HOSPITAL | Age: 71
End: 2021-11-12
Attending: INTERNAL MEDICINE
Payer: MEDICARE

## 2021-11-12 DIAGNOSIS — E11.9 TYPE 2 DIABETES MELLITUS WITHOUT COMPLICATION, WITHOUT LONG-TERM CURRENT USE OF INSULIN (HCC): ICD-10-CM

## 2021-11-12 DIAGNOSIS — I25.9 CHRONIC ISCHEMIC HEART DISEASE: ICD-10-CM

## 2021-11-12 DIAGNOSIS — I10 ESSENTIAL HYPERTENSION: ICD-10-CM

## 2021-11-12 PROCEDURE — 93010 ELECTROCARDIOGRAM REPORT: CPT | Performed by: INTERNAL MEDICINE

## 2021-11-12 PROCEDURE — 36415 COLL VENOUS BLD VENIPUNCTURE: CPT

## 2021-11-12 PROCEDURE — 85025 COMPLETE CBC W/AUTO DIFF WBC: CPT

## 2021-11-12 PROCEDURE — 80053 COMPREHEN METABOLIC PANEL: CPT

## 2021-11-12 PROCEDURE — 83036 HEMOGLOBIN GLYCOSYLATED A1C: CPT

## 2021-11-12 PROCEDURE — 93005 ELECTROCARDIOGRAM TRACING: CPT

## 2021-11-14 DIAGNOSIS — R97.20 ELEVATED PSA: Primary | ICD-10-CM

## 2021-11-18 ENCOUNTER — OFFICE VISIT (OUTPATIENT)
Dept: INTERNAL MEDICINE CLINIC | Facility: CLINIC | Age: 71
End: 2021-11-18
Payer: MEDICARE

## 2021-11-18 VITALS
SYSTOLIC BLOOD PRESSURE: 135 MMHG | BODY MASS INDEX: 35.39 KG/M2 | HEIGHT: 70 IN | TEMPERATURE: 97 F | HEART RATE: 65 BPM | WEIGHT: 247.19 LBS | DIASTOLIC BLOOD PRESSURE: 83 MMHG

## 2021-11-18 DIAGNOSIS — Z99.89 OBSTRUCTIVE SLEEP APNEA ON CPAP: ICD-10-CM

## 2021-11-18 DIAGNOSIS — M18.10 ARTHRITIS OF CARPOMETACARPAL (CMC) JOINT OF THUMB: ICD-10-CM

## 2021-11-18 DIAGNOSIS — E11.9 TYPE 2 DIABETES MELLITUS WITHOUT COMPLICATION, WITHOUT LONG-TERM CURRENT USE OF INSULIN (HCC): ICD-10-CM

## 2021-11-18 DIAGNOSIS — I10 ESSENTIAL HYPERTENSION: Chronic | ICD-10-CM

## 2021-11-18 DIAGNOSIS — I25.9 CHRONIC ISCHEMIC HEART DISEASE: ICD-10-CM

## 2021-11-18 DIAGNOSIS — Z01.818 PRE-OP EXAM: Primary | ICD-10-CM

## 2021-11-18 DIAGNOSIS — G47.33 OBSTRUCTIVE SLEEP APNEA ON CPAP: ICD-10-CM

## 2021-11-18 DIAGNOSIS — E78.2 MIXED HYPERLIPIDEMIA: ICD-10-CM

## 2021-11-18 PROBLEM — R07.89 ATYPICAL CHEST PAIN: Status: RESOLVED | Noted: 2019-01-14 | Resolved: 2021-11-18

## 2021-11-18 PROBLEM — M25.531 RIGHT WRIST PAIN: Status: RESOLVED | Noted: 2021-03-11 | Resolved: 2021-11-18

## 2021-11-18 PROCEDURE — 90662 IIV NO PRSV INCREASED AG IM: CPT | Performed by: INTERNAL MEDICINE

## 2021-11-18 PROCEDURE — G0008 ADMIN INFLUENZA VIRUS VAC: HCPCS | Performed by: INTERNAL MEDICINE

## 2021-11-18 PROCEDURE — 99214 OFFICE O/P EST MOD 30 MIN: CPT | Performed by: INTERNAL MEDICINE

## 2021-11-18 RX ORDER — ATORVASTATIN CALCIUM 20 MG/1
20 TABLET, FILM COATED ORAL DAILY
Qty: 90 TABLET | Refills: 1 | Status: SHIPPED | OUTPATIENT
Start: 2021-11-18

## 2021-11-18 RX ORDER — FENOFIBRATE 160 MG/1
160 TABLET ORAL EVERY EVENING
Qty: 90 TABLET | Refills: 1 | Status: SHIPPED | OUTPATIENT
Start: 2021-11-18

## 2021-11-18 RX ORDER — METOPROLOL SUCCINATE 25 MG/1
25 TABLET, EXTENDED RELEASE ORAL
Qty: 90 TABLET | Refills: 1 | Status: SHIPPED | OUTPATIENT
Start: 2021-11-18

## 2021-11-18 NOTE — PROGRESS NOTES
HPI:    Patient ID: Edmar Muir is a 66year old male. HPI:  Pt presents for pre-op exam for right wrist surgery on 12/8/21 with Dr. Dot Meyer. He had labs recently. He takes Plavix and aspirin for CAD.     Pt c/o pain in his right wrist for a total) by mouth daily with breakfast. 180 tablet 1   • LOSARTAN POTASSIUM 50 MG Oral Tab TAKE 1 TABLET(50 MG) BY MOUTH DAILY 90 tablet 1   • triamcinolone acetonide 0.1 % External Ointment Apply to AA- Rash     • Fluocinonide 0.05 % External Ointment Apply Allergic/Immunologic: Positive for environmental allergies. Negative for food allergies. Neurological: Negative for headaches. Psychiatric/Behavioral: Negative for dysphoric mood. The patient is not nervous/anxious.          ./83 (BP Location: L ASSESSMENT/PLAN:     1. Pre-op exam  2. Arthritis of carpometacarpal (CMC) joint of thumb  Clear for surgery. 3. Chronic ischemic heart disease  Stable. Continue present management. - metoprolol succinate 25 MG Oral Tablet 24 Hr;  Take 1 tablet (25 m

## 2021-11-30 ENCOUNTER — MED REC SCAN ONLY (OUTPATIENT)
Dept: INTERNAL MEDICINE CLINIC | Facility: CLINIC | Age: 71
End: 2021-11-30

## 2021-12-05 ENCOUNTER — LAB REQUISITION (OUTPATIENT)
Dept: SURGERY | Age: 71
End: 2021-12-05
Payer: MEDICARE

## 2021-12-05 DIAGNOSIS — Z01.818 PREOP EXAMINATION: ICD-10-CM

## 2021-12-28 ENCOUNTER — PATIENT MESSAGE (OUTPATIENT)
Dept: INTERNAL MEDICINE CLINIC | Facility: CLINIC | Age: 71
End: 2021-12-28

## 2021-12-29 ENCOUNTER — LAB ENCOUNTER (OUTPATIENT)
Dept: LAB | Age: 71
End: 2021-12-29
Attending: INTERNAL MEDICINE
Payer: MEDICARE

## 2021-12-29 ENCOUNTER — TELEPHONE (OUTPATIENT)
Dept: INTERNAL MEDICINE CLINIC | Facility: CLINIC | Age: 71
End: 2021-12-29

## 2021-12-29 DIAGNOSIS — R05.9 COUGH: ICD-10-CM

## 2021-12-29 DIAGNOSIS — R05.9 COUGH: Primary | ICD-10-CM

## 2021-12-29 NOTE — TELEPHONE ENCOUNTER
----- Message from Jess Rosas sent at 12/28/2021 11:39 PM CST -----  Regarding: Need advice   My wife Angelica Joyner and myself received our  Covid vaccinations approximately one year ago. On December 5 I was tested and the  results came back negative.

## 2021-12-29 NOTE — TELEPHONE ENCOUNTER
See acute encounter 12/29/21. From: Liam Grief  To: Alexsandra Phan MD  Sent: 12/28/2021 11:39 PM CST  Subject: Need advice     My wife Jj Simental and myself received our  Covid vaccinations approximately one year ago.  On December 5 I was tested

## 2021-12-29 NOTE — TELEPHONE ENCOUNTER
Verified name and . Patient calling to report that he has symptoms of body aches, chest congestion, sore throat, cough. He denies any difficulty breathing or chest pain at this time.     He is requesting a COVID-19 test.    COVID-19 test ordered per

## 2021-12-30 ENCOUNTER — TELEPHONE (OUTPATIENT)
Dept: INTERNAL MEDICINE CLINIC | Facility: CLINIC | Age: 71
End: 2021-12-30

## 2021-12-30 ENCOUNTER — TELEMEDICINE (OUTPATIENT)
Dept: INTERNAL MEDICINE CLINIC | Facility: CLINIC | Age: 71
End: 2021-12-30
Payer: MEDICARE

## 2021-12-30 DIAGNOSIS — M79.10 MYALGIA: ICD-10-CM

## 2021-12-30 DIAGNOSIS — R50.9 SUBJECTIVE FEVER: ICD-10-CM

## 2021-12-30 DIAGNOSIS — R53.83 OTHER FATIGUE: ICD-10-CM

## 2021-12-30 DIAGNOSIS — J02.9 SORE THROAT: Primary | ICD-10-CM

## 2021-12-30 PROCEDURE — 99213 OFFICE O/P EST LOW 20 MIN: CPT | Performed by: INTERNAL MEDICINE

## 2021-12-30 RX ORDER — AZITHROMYCIN 250 MG/1
TABLET, FILM COATED ORAL
Qty: 6 TABLET | Refills: 0 | Status: SHIPPED | OUTPATIENT
Start: 2021-12-30 | End: 2022-01-04

## 2021-12-30 NOTE — TELEPHONE ENCOUNTER
Spoke with pt and his wife,  verified. Pt c/o sore throat, body ache, chills, congestion, x 3d ays, he felt warm, he has no thermometer and this morning he felt worse. Pt tried tylenol, robitussin dm, ineffective. Pt req ov and abx.    Virtual appt

## 2021-12-30 NOTE — PROGRESS NOTES
Patient ID: Blayne Bear is a 66year old male. Patient presents with:  Sick Call         HISTORY OF PRESENT ILLNESS:   Patient presents for above. This visit is conducted using Telemedicine with live, interactive video and audio.   Patient presents for Per NG: septo; turb. red; smr of turbs.    • TONSILLECTOMY     • TOTAL KNEE REPLACEMENT Right 02/27/2020   • UPPER GI ENDOSCOPY,BIOPSY           Current Outpatient Medications:   •  azithromycin 250 MG Oral Tab, Take 2 tablets (500 mg total) by mouth daily Comment: occasionally: Eelna      Drug use: No      Sexual activity: Not on file    Other Topics      Concerns:         Service: Not Asked        Blood Transfusions: Not Asked        Caffeine Concern: Yes          Per NG: coffee, 3 cups        Occ daily for 1 day, THEN 1 tablet (250 mg total) daily for 4 days. Dispense: 6 tablet; Refill: 0  · As per #1. Return if symptoms worsen or fail to improve.     Time spent on encounter  11 minutes   Video time 6 minutes   Documentation time 5 minutes     W submitted for this visit based on complexity of care and/or time spent for the visit. This note was prepared using [employer] voice recognition dictation software. As a result errors may occur. When identified these errors have been corrected.  While

## 2021-12-31 LAB — SARS-COV-2 RNA RESP QL NAA+PROBE: DETECTED

## 2022-02-06 DIAGNOSIS — I10 ESSENTIAL HYPERTENSION: Chronic | ICD-10-CM

## 2022-02-07 RX ORDER — LOSARTAN POTASSIUM 50 MG/1
50 TABLET ORAL DAILY
Qty: 90 TABLET | Refills: 1 | Status: SHIPPED | OUTPATIENT
Start: 2022-02-07 | End: 2022-08-08

## 2022-02-07 NOTE — TELEPHONE ENCOUNTER
Refill passed per [employer] protocol.   Requested Prescriptions   Pending Prescriptions Disp Refills    LOSARTAN 50 MG Oral Tab [Pharmacy Med Name: LOSARTAN 50MG TABLETS] 90 tablet 1     Sig: TAKE 1 TABLET(50 MG) BY MOUTH DAILY        Hypertensive Medications Protocol Passed - 2/6/2022  4:03 AM        Passed - CMP or BMP in past 12 months        Passed - Appointment in past 6 or next 3 months        Passed - GFR Non- > 50     Lab Results   Component Value Date    GFRNAA 76 11/12/2021                        Recent Outpatient Visits              1 month ago Sore throat    [facility], [address] [place], [place]    Telemedicine    2 months ago Pre-op exam    Angela Norman MD    Office Visit    3 months ago Elevated PSA    [facility] for [address], [facility] Hema Holbrook MD    Office Visit    6 months ago Type 2 diabetes mellitus without complication, without long-term current use of insulin [facility])    [facility], [address], Magan Valladares MD    Office Visit    10 months ago Elevated PSA    [facility], [address], Mando Simmons MD    Office Visit            Future Appointments         Provider Department Appt Notes    In 1 month Marisela Alva MD [facility], [address], [facility] px    In 2 months Hema Holbrook, [address] [place] [address], [address], [facility]

## 2022-02-08 ENCOUNTER — TELEPHONE (OUTPATIENT)
Dept: INTERNAL MEDICINE CLINIC | Facility: CLINIC | Age: 72
End: 2022-02-08

## 2022-02-15 ENCOUNTER — OFFICE VISIT (OUTPATIENT)
Dept: INTERNAL MEDICINE CLINIC | Facility: CLINIC | Age: 72
End: 2022-02-15
Payer: MEDICARE

## 2022-02-15 ENCOUNTER — LAB ENCOUNTER (OUTPATIENT)
Dept: LAB | Facility: HOSPITAL | Age: 72
End: 2022-02-15
Attending: INTERNAL MEDICINE
Payer: MEDICARE

## 2022-02-15 VITALS
HEART RATE: 58 BPM | WEIGHT: 247.31 LBS | RESPIRATION RATE: 16 BRPM | DIASTOLIC BLOOD PRESSURE: 77 MMHG | BODY MASS INDEX: 35.41 KG/M2 | HEIGHT: 70 IN | TEMPERATURE: 97 F | SYSTOLIC BLOOD PRESSURE: 118 MMHG

## 2022-02-15 DIAGNOSIS — G47.33 OBSTRUCTIVE SLEEP APNEA ON CPAP: ICD-10-CM

## 2022-02-15 DIAGNOSIS — M18.11 PRIMARY OSTEOARTHRITIS OF FIRST CARPOMETACARPAL JOINT OF RIGHT HAND: ICD-10-CM

## 2022-02-15 DIAGNOSIS — E11.9 TYPE 2 DIABETES MELLITUS WITHOUT COMPLICATION, WITHOUT LONG-TERM CURRENT USE OF INSULIN (HCC): ICD-10-CM

## 2022-02-15 DIAGNOSIS — Z01.818 PRE-OP EXAM: Primary | ICD-10-CM

## 2022-02-15 DIAGNOSIS — Z99.89 OBSTRUCTIVE SLEEP APNEA ON CPAP: ICD-10-CM

## 2022-02-15 DIAGNOSIS — M19.039 WRIST ARTHRITIS: ICD-10-CM

## 2022-02-15 DIAGNOSIS — I10 ESSENTIAL HYPERTENSION: Chronic | ICD-10-CM

## 2022-02-15 DIAGNOSIS — I25.9 CHRONIC ISCHEMIC HEART DISEASE: ICD-10-CM

## 2022-02-15 LAB
ALBUMIN SERPL-MCNC: 4.3 G/DL (ref 3.4–5)
ALBUMIN/GLOB SERPL: 1.6 {RATIO} (ref 1–2)
ALP LIVER SERPL-CCNC: 37 U/L
ALT SERPL-CCNC: 36 U/L
ANION GAP SERPL CALC-SCNC: 8 MMOL/L (ref 0–18)
AST SERPL-CCNC: 19 U/L (ref 15–37)
BASOPHILS # BLD AUTO: 0.05 X10(3) UL (ref 0–0.2)
BASOPHILS NFR BLD AUTO: 0.7 %
BILIRUB SERPL-MCNC: 0.3 MG/DL (ref 0.1–2)
BUN BLD-MCNC: 21 MG/DL (ref 7–18)
BUN/CREAT SERPL: 20.8 (ref 10–20)
CALCIUM BLD-MCNC: 10 MG/DL (ref 8.5–10.1)
CHLORIDE SERPL-SCNC: 108 MMOL/L (ref 98–112)
CO2 SERPL-SCNC: 23 MMOL/L (ref 21–32)
CREAT BLD-MCNC: 1.01 MG/DL
DEPRECATED RDW RBC AUTO: 46.5 FL (ref 35.1–46.3)
EOSINOPHIL NFR BLD AUTO: 3.1 %
ERYTHROCYTE [DISTWIDTH] IN BLOOD BY AUTOMATED COUNT: 14.1 % (ref 11–15)
EST. AVERAGE GLUCOSE BLD GHB EST-MCNC: 137 MG/DL (ref 68–126)
FASTING STATUS PATIENT QL REPORTED: NO
GLOBULIN PLAS-MCNC: 2.7 G/DL (ref 2.8–4.4)
GLUCOSE BLD-MCNC: 93 MG/DL (ref 70–99)
HBA1C MFR BLD: 6.4 % (ref ?–5.7)
HCT VFR BLD AUTO: 42.9 %
IMM GRANULOCYTES # BLD AUTO: 0.05 X10(3) UL (ref 0–1)
IMM GRANULOCYTES NFR BLD: 0.7 %
LYMPHOCYTES # BLD AUTO: 2.89 X10(3) UL (ref 1–4)
LYMPHOCYTES NFR BLD AUTO: 37.9 %
MCH RBC QN AUTO: 29.9 PG (ref 26–34)
MCHC RBC AUTO-ENTMCNC: 33.3 G/DL (ref 31–37)
MCV RBC AUTO: 89.6 FL
MONOCYTES # BLD AUTO: 0.78 X10(3) UL (ref 0.1–1)
MONOCYTES NFR BLD AUTO: 10.2 %
NEUTROPHILS # BLD AUTO: 3.61 X10 (3) UL (ref 1.5–7.7)
NEUTROPHILS # BLD AUTO: 3.61 X10(3) UL (ref 1.5–7.7)
NEUTROPHILS NFR BLD AUTO: 47.4 %
OSMOLALITY SERPL CALC.SUM OF ELEC: 291 MOSM/KG (ref 275–295)
PLATELET # BLD AUTO: 242 10(3)UL (ref 150–450)
POTASSIUM SERPL-SCNC: 4.4 MMOL/L (ref 3.5–5.1)
PROT SERPL-MCNC: 7 G/DL (ref 6.4–8.2)
RBC # BLD AUTO: 4.79 X10(6)UL
SODIUM SERPL-SCNC: 139 MMOL/L (ref 136–145)
WBC # BLD AUTO: 7.6 X10(3) UL (ref 4–11)

## 2022-02-15 PROCEDURE — 36415 COLL VENOUS BLD VENIPUNCTURE: CPT

## 2022-02-15 PROCEDURE — 85025 COMPLETE CBC W/AUTO DIFF WBC: CPT

## 2022-02-15 PROCEDURE — 99214 OFFICE O/P EST MOD 30 MIN: CPT | Performed by: INTERNAL MEDICINE

## 2022-02-15 PROCEDURE — 83036 HEMOGLOBIN GLYCOSYLATED A1C: CPT

## 2022-02-15 PROCEDURE — 80053 COMPREHEN METABOLIC PANEL: CPT

## 2022-02-16 ENCOUNTER — TELEPHONE (OUTPATIENT)
Dept: INTERNAL MEDICINE CLINIC | Facility: CLINIC | Age: 72
End: 2022-02-16

## 2022-02-16 NOTE — TELEPHONE ENCOUNTER
Lorena from [address] [place] [address] is calling to request the surgical clearance for patient scheduled for 2/24/22.   Please call [phone]     FAX # [phone]

## 2022-02-24 ENCOUNTER — LAB REQUISITION (OUTPATIENT)
Dept: SURGERY | Age: 72
End: 2022-02-24
Payer: MEDICARE

## 2022-02-24 PROCEDURE — 88305 TISSUE EXAM BY PATHOLOGIST: CPT | Performed by: ORTHOPAEDIC SURGERY

## 2022-02-24 PROCEDURE — 88311 DECALCIFY TISSUE: CPT | Performed by: ORTHOPAEDIC SURGERY

## 2022-03-17 ENCOUNTER — LAB ENCOUNTER (OUTPATIENT)
Dept: LAB | Facility: HOSPITAL | Age: 72
End: 2022-03-17
Attending: INTERNAL MEDICINE
Payer: MEDICARE

## 2022-03-17 ENCOUNTER — OFFICE VISIT (OUTPATIENT)
Dept: INTERNAL MEDICINE CLINIC | Facility: CLINIC | Age: 72
End: 2022-03-17
Payer: MEDICARE

## 2022-03-17 VITALS
HEART RATE: 76 BPM | DIASTOLIC BLOOD PRESSURE: 82 MMHG | HEIGHT: 70 IN | WEIGHT: 242.88 LBS | SYSTOLIC BLOOD PRESSURE: 137 MMHG | BODY MASS INDEX: 34.77 KG/M2

## 2022-03-17 DIAGNOSIS — I10 ESSENTIAL HYPERTENSION: Chronic | ICD-10-CM

## 2022-03-17 DIAGNOSIS — I25.9 CHRONIC ISCHEMIC HEART DISEASE: ICD-10-CM

## 2022-03-17 DIAGNOSIS — G47.33 OBSTRUCTIVE SLEEP APNEA ON CPAP: ICD-10-CM

## 2022-03-17 DIAGNOSIS — E11.9 TYPE 2 DIABETES MELLITUS WITHOUT COMPLICATION, WITHOUT LONG-TERM CURRENT USE OF INSULIN (HCC): ICD-10-CM

## 2022-03-17 DIAGNOSIS — Z99.89 OBSTRUCTIVE SLEEP APNEA ON CPAP: ICD-10-CM

## 2022-03-17 DIAGNOSIS — E78.2 MIXED HYPERLIPIDEMIA: ICD-10-CM

## 2022-03-17 DIAGNOSIS — Z00.00 MEDICARE ANNUAL WELLNESS VISIT, SUBSEQUENT: Primary | ICD-10-CM

## 2022-03-17 DIAGNOSIS — M19.90 OSTEOARTHRITIS, UNSPECIFIED OSTEOARTHRITIS TYPE, UNSPECIFIED SITE: ICD-10-CM

## 2022-03-17 LAB
CHOLEST SERPL-MCNC: 159 MG/DL (ref ?–200)
CREAT UR-SCNC: 69 MG/DL
FASTING PATIENT LIPID ANSWER: NO
HDLC SERPL-MCNC: 29 MG/DL (ref 40–59)
LDLC SERPL CALC-MCNC: 88 MG/DL (ref ?–100)
MICROALBUMIN UR-MCNC: 1.77 MG/DL
MICROALBUMIN/CREAT 24H UR-RTO: 25.7 UG/MG (ref ?–30)
NONHDLC SERPL-MCNC: 130 MG/DL (ref ?–130)
TRIGL SERPL-MCNC: 252 MG/DL (ref 30–149)
VLDLC SERPL CALC-MCNC: 41 MG/DL (ref 0–30)

## 2022-03-17 PROCEDURE — 82570 ASSAY OF URINE CREATININE: CPT

## 2022-03-17 PROCEDURE — 80061 LIPID PANEL: CPT

## 2022-03-17 PROCEDURE — G0439 PPPS, SUBSEQ VISIT: HCPCS | Performed by: INTERNAL MEDICINE

## 2022-03-17 PROCEDURE — 36415 COLL VENOUS BLD VENIPUNCTURE: CPT

## 2022-03-17 PROCEDURE — 82043 UR ALBUMIN QUANTITATIVE: CPT

## 2022-03-17 RX ORDER — METOPROLOL SUCCINATE 25 MG/1
25 TABLET, EXTENDED RELEASE ORAL
Qty: 90 TABLET | Refills: 1 | Status: SHIPPED | OUTPATIENT
Start: 2022-03-17

## 2022-03-17 RX ORDER — ATORVASTATIN CALCIUM 20 MG/1
20 TABLET, FILM COATED ORAL DAILY
Qty: 90 TABLET | Refills: 1 | Status: SHIPPED | OUTPATIENT
Start: 2022-03-17

## 2022-03-17 RX ORDER — FENOFIBRATE 160 MG/1
160 TABLET ORAL EVERY EVENING
Qty: 90 TABLET | Refills: 1 | Status: SHIPPED | OUTPATIENT
Start: 2022-03-17

## 2022-03-17 RX ORDER — METFORMIN HYDROCHLORIDE 500 MG/1
1000 TABLET, EXTENDED RELEASE ORAL
Qty: 180 TABLET | Refills: 1 | Status: SHIPPED | OUTPATIENT
Start: 2022-03-17

## 2022-03-30 ENCOUNTER — RX ONLY (RX ONLY)
Age: 72
End: 2022-03-30

## 2022-03-30 RX ORDER — TRIAMCINOLONE ACETONIDE 1 MG/G
OINTMENT TOPICAL
Qty: 454 | Refills: 0 | Status: ERX

## 2022-03-30 RX ORDER — FLUOCINONIDE 0.5 MG/G
OINTMENT TOPICAL
Qty: 60 | Refills: 0 | Status: ERX

## 2022-04-27 ENCOUNTER — LAB ENCOUNTER (OUTPATIENT)
Dept: LAB | Age: 72
End: 2022-04-27
Attending: UROLOGY
Payer: MEDICARE

## 2022-04-27 DIAGNOSIS — R97.20 ELEVATED PSA: ICD-10-CM

## 2022-04-27 LAB
PSA FREE MFR SERPL: 9 %
PSA FREE SERPL-MCNC: 0.53 NG/ML
PSA SERPL-MCNC: 5.82 NG/ML (ref ?–4)

## 2022-04-27 PROCEDURE — 84154 ASSAY OF PSA FREE: CPT

## 2022-04-27 PROCEDURE — 36415 COLL VENOUS BLD VENIPUNCTURE: CPT

## 2022-04-27 PROCEDURE — 84153 ASSAY OF PSA TOTAL: CPT

## 2022-05-03 ENCOUNTER — OFFICE VISIT (OUTPATIENT)
Dept: SURGERY | Facility: CLINIC | Age: 72
End: 2022-05-03
Payer: MEDICARE

## 2022-05-03 DIAGNOSIS — N40.1 BENIGN PROSTATIC HYPERPLASIA WITH NOCTURIA: ICD-10-CM

## 2022-05-03 DIAGNOSIS — Z79.82 LONG TERM CURRENT USE OF ASPIRIN: ICD-10-CM

## 2022-05-03 DIAGNOSIS — N40.2 PROSTATE NODULE: ICD-10-CM

## 2022-05-03 DIAGNOSIS — N52.01 ERECTILE DYSFUNCTION DUE TO ARTERIAL INSUFFICIENCY: ICD-10-CM

## 2022-05-03 DIAGNOSIS — R97.20 ELEVATED PSA: Primary | ICD-10-CM

## 2022-05-03 DIAGNOSIS — R35.1 BENIGN PROSTATIC HYPERPLASIA WITH NOCTURIA: ICD-10-CM

## 2022-05-03 DIAGNOSIS — R35.1 NOCTURIA: ICD-10-CM

## 2022-05-03 PROCEDURE — 99214 OFFICE O/P EST MOD 30 MIN: CPT | Performed by: UROLOGY

## 2022-05-03 NOTE — PATIENT INSTRUCTIONS
Zuhair Swain M.D.  Hope Irene    1. Visit in 6 months with either  Dr. Roger Yao or Dr. Mathieu Hammonds . Approximately 2--10 days before visit, please get blood draw for PSA--total and free and also undergo MRI of the prostate for your persistently elevated PSA. To schedule the MRI, please call [phone]; set the MRI up for either April or May 2023.    2.    The heart healthy diet is the prostate healthy diet. Eat healthy food choices from the [facility] diet: avoid processed foods and avoid polyunsaturated fats. Eating fish is  better than poultry,  which is better than red meat. Beans are a very healthy option. Whole grain is better than processed grain. A significant part of your diet should be  vegetables, nuts, and fruits. Olive oil is better than butter,  which is likely better than margarine. Fish oil supplement recommended. An excessive amount of vitamin pills is not recommended, and could pose a risk to your health.

## 2022-06-06 ENCOUNTER — APPOINTMENT (OUTPATIENT)
Dept: URBAN - METROPOLITAN AREA CLINIC 244 | Age: 72
Setting detail: DERMATOLOGY
End: 2022-06-07

## 2022-06-06 DIAGNOSIS — L82.1 OTHER SEBORRHEIC KERATOSIS: ICD-10-CM

## 2022-06-06 DIAGNOSIS — L85.8 OTHER SPECIFIED EPIDERMAL THICKENING: ICD-10-CM

## 2022-06-06 DIAGNOSIS — L81.4 OTHER MELANIN HYPERPIGMENTATION: ICD-10-CM

## 2022-06-06 DIAGNOSIS — D22 MELANOCYTIC NEVI: ICD-10-CM

## 2022-06-06 DIAGNOSIS — L20.89 OTHER ATOPIC DERMATITIS: ICD-10-CM

## 2022-06-06 PROBLEM — L20.84 INTRINSIC (ALLERGIC) ECZEMA: Status: ACTIVE | Noted: 2022-06-06

## 2022-06-06 PROBLEM — D22.5 MELANOCYTIC NEVI OF TRUNK: Status: ACTIVE | Noted: 2022-06-06

## 2022-06-06 PROCEDURE — OTHER COUNSELING: OTHER

## 2022-06-06 PROCEDURE — OTHER PRESCRIPTION MEDICATION MANAGEMENT: OTHER

## 2022-06-06 PROCEDURE — 99214 OFFICE O/P EST MOD 30 MIN: CPT

## 2022-06-06 PROCEDURE — OTHER PRESCRIPTION: OTHER

## 2022-06-06 ASSESSMENT — LOCATION SIMPLE DESCRIPTION DERM
LOCATION SIMPLE: LEFT UPPER BACK
LOCATION SIMPLE: CHEST
LOCATION SIMPLE: RIGHT PLANTAR SURFACE
LOCATION SIMPLE: LEFT HAND
LOCATION SIMPLE: RIGHT UPPER BACK
LOCATION SIMPLE: RIGHT PLANTAR SURFACE

## 2022-06-06 ASSESSMENT — LOCATION ZONE DERM
LOCATION ZONE: FEET
LOCATION ZONE: FEET
LOCATION ZONE: TRUNK
LOCATION ZONE: HAND

## 2022-06-06 ASSESSMENT — LOCATION DETAILED DESCRIPTION DERM
LOCATION DETAILED: RIGHT LATERAL PLANTAR HEEL
LOCATION DETAILED: RIGHT LATERAL PLANTAR HEEL
LOCATION DETAILED: RIGHT SUPERIOR MEDIAL UPPER BACK
LOCATION DETAILED: LEFT HYPOTHENAR EMINENCE
LOCATION DETAILED: LEFT MEDIAL UPPER BACK
LOCATION DETAILED: UPPER STERNUM

## 2022-07-06 ENCOUNTER — APPOINTMENT (OUTPATIENT)
Dept: URBAN - METROPOLITAN AREA CLINIC 244 | Age: 72
Setting detail: DERMATOLOGY
End: 2022-07-06

## 2022-07-06 DIAGNOSIS — L85.8 OTHER SPECIFIED EPIDERMAL THICKENING: ICD-10-CM

## 2022-07-06 DIAGNOSIS — L20.89 OTHER ATOPIC DERMATITIS: ICD-10-CM

## 2022-07-06 PROBLEM — L20.84 INTRINSIC (ALLERGIC) ECZEMA: Status: ACTIVE | Noted: 2022-07-06

## 2022-07-06 PROCEDURE — 99214 OFFICE O/P EST MOD 30 MIN: CPT

## 2022-07-06 PROCEDURE — OTHER PRESCRIPTION: OTHER

## 2022-07-06 PROCEDURE — OTHER PRESCRIPTION MEDICATION MANAGEMENT: OTHER

## 2022-07-06 PROCEDURE — OTHER ADDITIONAL NOTES: OTHER

## 2022-07-06 PROCEDURE — OTHER COUNSELING: OTHER

## 2022-07-06 RX ORDER — FLUOCINONIDE 0.5 MG/G
OINTMENT TOPICAL
Qty: 60 | Refills: 1 | Status: ERX | COMMUNITY
Start: 2022-07-06

## 2022-07-06 ASSESSMENT — LOCATION ZONE DERM
LOCATION ZONE: HAND
LOCATION ZONE: FEET
LOCATION ZONE: FEET

## 2022-07-06 ASSESSMENT — LOCATION SIMPLE DESCRIPTION DERM
LOCATION SIMPLE: LEFT HAND
LOCATION SIMPLE: RIGHT PLANTAR SURFACE
LOCATION SIMPLE: RIGHT PLANTAR SURFACE

## 2022-07-06 ASSESSMENT — LOCATION DETAILED DESCRIPTION DERM
LOCATION DETAILED: LEFT HYPOTHENAR EMINENCE
LOCATION DETAILED: RIGHT LATERAL PLANTAR HEEL
LOCATION DETAILED: RIGHT LATERAL PLANTAR HEEL

## 2022-07-06 NOTE — PROCEDURE: ADDITIONAL NOTES
Additional Notes: Patient was given sheet for liquor carbonis detergens
Detail Level: Detailed
Render Risk Assessment In Note?: no

## 2022-08-08 ENCOUNTER — APPOINTMENT (OUTPATIENT)
Dept: URBAN - METROPOLITAN AREA CLINIC 244 | Age: 72
Setting detail: DERMATOLOGY
End: 2022-08-09

## 2022-08-08 DIAGNOSIS — I10 ESSENTIAL HYPERTENSION: Chronic | ICD-10-CM

## 2022-08-08 DIAGNOSIS — L20.89 OTHER ATOPIC DERMATITIS: ICD-10-CM

## 2022-08-08 PROBLEM — L20.84 INTRINSIC (ALLERGIC) ECZEMA: Status: ACTIVE | Noted: 2022-08-08

## 2022-08-08 PROCEDURE — OTHER COUNSELING: OTHER

## 2022-08-08 PROCEDURE — 99213 OFFICE O/P EST LOW 20 MIN: CPT | Mod: 25

## 2022-08-08 PROCEDURE — OTHER PRESCRIPTION MEDICATION MANAGEMENT: OTHER

## 2022-08-08 PROCEDURE — 96372 THER/PROPH/DIAG INJ SC/IM: CPT

## 2022-08-08 PROCEDURE — OTHER INTRAMUSCULAR KENALOG: OTHER

## 2022-08-08 PROCEDURE — OTHER SEPARATE AND IDENTIFIABLE DOCUMENTATION: OTHER

## 2022-08-08 RX ORDER — LOSARTAN POTASSIUM 50 MG/1
TABLET ORAL
Qty: 90 TABLET | Refills: 1 | Status: SHIPPED | OUTPATIENT
Start: 2022-08-08

## 2022-08-08 ASSESSMENT — LOCATION SIMPLE DESCRIPTION DERM
LOCATION SIMPLE: RIGHT PLANTAR SURFACE
LOCATION SIMPLE: LEFT HAND
LOCATION SIMPLE: RIGHT BUTTOCK

## 2022-08-08 ASSESSMENT — LOCATION DETAILED DESCRIPTION DERM
LOCATION DETAILED: RIGHT BUTTOCK
LOCATION DETAILED: LEFT HYPOTHENAR EMINENCE
LOCATION DETAILED: RIGHT LATERAL PLANTAR HEEL

## 2022-08-08 ASSESSMENT — LOCATION ZONE DERM
LOCATION ZONE: TRUNK
LOCATION ZONE: FEET
LOCATION ZONE: HAND

## 2022-08-08 NOTE — PROCEDURE: PRESCRIPTION MEDICATION MANAGEMENT
Detail Level: Zone
Render In Strict Bullet Format?: No
Continue Regimen: Urea, Fluocinonide 0.05% ointment combined with liquor carbonis detergens

## 2022-09-15 ENCOUNTER — OFFICE VISIT (OUTPATIENT)
Dept: INTERNAL MEDICINE CLINIC | Facility: CLINIC | Age: 72
End: 2022-09-15
Payer: MEDICARE

## 2022-09-15 VITALS
HEART RATE: 81 BPM | HEIGHT: 70 IN | RESPIRATION RATE: 20 BRPM | OXYGEN SATURATION: 96 % | DIASTOLIC BLOOD PRESSURE: 71 MMHG | BODY MASS INDEX: 35.07 KG/M2 | SYSTOLIC BLOOD PRESSURE: 100 MMHG | WEIGHT: 245 LBS

## 2022-09-15 DIAGNOSIS — K42.9 PERIUMBILICAL HERNIA: ICD-10-CM

## 2022-09-15 DIAGNOSIS — E11.9 TYPE 2 DIABETES MELLITUS WITHOUT COMPLICATION, WITHOUT LONG-TERM CURRENT USE OF INSULIN (HCC): Primary | ICD-10-CM

## 2022-09-15 DIAGNOSIS — I25.9 CHRONIC ISCHEMIC HEART DISEASE: ICD-10-CM

## 2022-09-15 DIAGNOSIS — E78.2 MIXED HYPERLIPIDEMIA: ICD-10-CM

## 2022-09-15 DIAGNOSIS — E66.01 CLASS 2 SEVERE OBESITY DUE TO EXCESS CALORIES WITH SERIOUS COMORBIDITY AND BODY MASS INDEX (BMI) OF 35.0 TO 35.9 IN ADULT (HCC): ICD-10-CM

## 2022-09-15 PROBLEM — E66.812 CLASS 2 SEVERE OBESITY DUE TO EXCESS CALORIES WITH SERIOUS COMORBIDITY AND BODY MASS INDEX (BMI) OF 35.0 TO 35.9 IN ADULT (HCC): Status: ACTIVE | Noted: 2022-09-15

## 2022-09-15 LAB
CARTRIDGE LOT#: 978 NUMERIC
HEMOGLOBIN A1C: 6.4 % (ref 4.3–5.6)

## 2022-09-15 PROCEDURE — 1126F AMNT PAIN NOTED NONE PRSNT: CPT | Performed by: INTERNAL MEDICINE

## 2022-09-15 PROCEDURE — 83036 HEMOGLOBIN GLYCOSYLATED A1C: CPT | Performed by: INTERNAL MEDICINE

## 2022-09-15 PROCEDURE — 99214 OFFICE O/P EST MOD 30 MIN: CPT | Performed by: INTERNAL MEDICINE

## 2022-09-15 RX ORDER — FENOFIBRATE 160 MG/1
160 TABLET ORAL EVERY EVENING
Qty: 90 TABLET | Refills: 1 | Status: SHIPPED | OUTPATIENT
Start: 2022-09-15

## 2022-09-15 RX ORDER — CEPHALEXIN 500 MG/1
CAPSULE ORAL
COMMUNITY
Start: 2022-09-14

## 2022-09-15 RX ORDER — METOPROLOL SUCCINATE 25 MG/1
25 TABLET, EXTENDED RELEASE ORAL
Qty: 90 TABLET | Refills: 1 | Status: SHIPPED | OUTPATIENT
Start: 2022-09-15

## 2022-09-15 RX ORDER — PSYLLIUM HUSK 0.4 G
CAPSULE ORAL
COMMUNITY
Start: 2022-04-27

## 2022-09-15 RX ORDER — METFORMIN HYDROCHLORIDE 500 MG/1
1000 TABLET, EXTENDED RELEASE ORAL
Qty: 180 TABLET | Refills: 1 | Status: SHIPPED | OUTPATIENT
Start: 2022-09-15

## 2022-09-15 RX ORDER — ATORVASTATIN CALCIUM 20 MG/1
20 TABLET, FILM COATED ORAL DAILY
Qty: 90 TABLET | Refills: 1 | Status: SHIPPED | OUTPATIENT
Start: 2022-09-15

## 2022-11-07 ENCOUNTER — TELEPHONE (OUTPATIENT)
Dept: SURGERY | Facility: CLINIC | Age: 72
End: 2022-11-07

## 2022-11-07 NOTE — TELEPHONE ENCOUNTER
Spoke with pt and notified [facility] sick. Pt ok with next available consult.      Future Appointments   Date Time Provider Prabha Gonzalez   12/19/2022  2:40 PM Evon Anglin MD [facility]

## 2022-11-22 NOTE — TELEPHONE ENCOUNTER
Noted.  Thank you. [FreeTextEntry1] : D/W caregiver pt is positive for influenza. Reviewed etiology of influenza and usual disease course; reviewed supportive care including antipyretics, fluids and nasal saline; advise monitor for worsening cough, persistent fever and dehydration- call for f/u if occurring; reviewed risk/benefits of Tamiflu use and indications- parents would like Tamiflu today.\par Rapid strep and covid negative, throat cx sent out. \par time spent: 35min

## 2022-12-19 ENCOUNTER — OFFICE VISIT (OUTPATIENT)
Dept: SURGERY | Facility: CLINIC | Age: 72
End: 2022-12-19
Payer: MEDICARE

## 2022-12-19 ENCOUNTER — LAB ENCOUNTER (OUTPATIENT)
Dept: LAB | Facility: HOSPITAL | Age: 72
End: 2022-12-19
Attending: UROLOGY
Payer: MEDICARE

## 2022-12-19 VITALS
BODY MASS INDEX: 35.07 KG/M2 | DIASTOLIC BLOOD PRESSURE: 78 MMHG | SYSTOLIC BLOOD PRESSURE: 132 MMHG | HEART RATE: 64 BPM | HEIGHT: 70 IN | WEIGHT: 245 LBS

## 2022-12-19 DIAGNOSIS — N40.2 PROSTATE NODULE: ICD-10-CM

## 2022-12-19 DIAGNOSIS — R97.20 ELEVATED PSA: Primary | ICD-10-CM

## 2022-12-19 DIAGNOSIS — N52.01 ERECTILE DYSFUNCTION DUE TO ARTERIAL INSUFFICIENCY: ICD-10-CM

## 2022-12-19 DIAGNOSIS — R35.1 NOCTURIA: ICD-10-CM

## 2022-12-19 DIAGNOSIS — N40.1 BENIGN PROSTATIC HYPERPLASIA WITH NOCTURIA: ICD-10-CM

## 2022-12-19 DIAGNOSIS — R97.20 ELEVATED PSA: ICD-10-CM

## 2022-12-19 DIAGNOSIS — R35.1 BENIGN PROSTATIC HYPERPLASIA WITH NOCTURIA: ICD-10-CM

## 2022-12-19 LAB
BILIRUB UR QL: NEGATIVE
CLARITY UR: CLEAR
COLOR UR: YELLOW
GLUCOSE UR-MCNC: NEGATIVE MG/DL
HGB UR QL STRIP.AUTO: NEGATIVE
KETONES UR-MCNC: NEGATIVE MG/DL
LEUKOCYTE ESTERASE UR QL STRIP.AUTO: NEGATIVE
NITRITE UR QL STRIP.AUTO: NEGATIVE
PH UR: 7 [PH] (ref 5–8)
PROT UR-MCNC: NEGATIVE MG/DL
PSA FREE MFR SERPL: 9 %
PSA FREE SERPL-MCNC: 0.6 NG/ML
PSA SERPL-MCNC: 6.55 NG/ML (ref ?–4)
SP GR UR STRIP: 1.02 (ref 1–1.03)
UROBILINOGEN UR STRIP-ACNC: 0.2

## 2022-12-19 PROCEDURE — 84154 ASSAY OF PSA FREE: CPT

## 2022-12-19 PROCEDURE — 81003 URINALYSIS AUTO W/O SCOPE: CPT

## 2022-12-19 PROCEDURE — 36415 COLL VENOUS BLD VENIPUNCTURE: CPT

## 2022-12-19 PROCEDURE — 84153 ASSAY OF PSA TOTAL: CPT

## 2022-12-22 ENCOUNTER — PATIENT MESSAGE (OUTPATIENT)
Dept: INTERNAL MEDICINE CLINIC | Facility: CLINIC | Age: 72
End: 2022-12-22

## 2022-12-22 NOTE — TELEPHONE ENCOUNTER
Routing to [facility] for Dr. Yolande Doll, please see patient's MyChart message below and advise. Thank you.

## 2023-01-28 NOTE — ASSESSMENT & PLAN NOTE
Patient's A1c was 6.3 today. The patient does not have neuropathy, nephropathy or retinopathy. Foot exam today was normal.  Continue present management. Medical Assessment Completed on: 28-Jan-2023 02:56

## 2023-02-10 ENCOUNTER — HOSPITAL ENCOUNTER (OUTPATIENT)
Age: 73
Discharge: HOME | End: 2023-02-10
Payer: MEDICARE

## 2023-02-10 VITALS
RESPIRATION RATE: 18 BRPM | TEMPERATURE: 99 F | SYSTOLIC BLOOD PRESSURE: 141 MMHG | OXYGEN SATURATION: 99 % | HEART RATE: 67 BPM | DIASTOLIC BLOOD PRESSURE: 69 MMHG

## 2023-02-10 DIAGNOSIS — L03.115 CELLULITIS OF RIGHT LOWER EXTREMITY: Primary | ICD-10-CM

## 2023-02-10 PROCEDURE — 99213 OFFICE O/P EST LOW 20 MIN: CPT

## 2023-02-10 PROCEDURE — 99214 OFFICE O/P EST MOD 30 MIN: CPT

## 2023-02-10 RX ORDER — CEPHALEXIN 500 MG/1
500 CAPSULE ORAL 3 TIMES DAILY
Qty: 21 CAPSULE | Refills: 0 | Status: SHIPPED | OUTPATIENT
Start: 2023-02-10 | End: 2023-02-17

## 2023-02-10 NOTE — ED INITIAL ASSESSMENT (HPI)
PATIENT ARRIVED AMBULATORY TO ROOM. PATIENT STATES 10 DAYS AGO HE TRIPPED WALKING UP STAIRS AND HIT HIS RIGHT SHIN ON A STEP. +REDNESS TO THE SHIN.  PATIENT CONCERNED BECAUSE SITE IS INCREASINGLY RED

## 2023-02-16 ENCOUNTER — RX ONLY (RX ONLY)
Age: 73
End: 2023-02-16

## 2023-02-16 RX ORDER — TRIAMCINOLONE ACETONIDE 1 MG/G
OINTMENT TOPICAL
Qty: 454 | Refills: 0 | Status: ERX | COMMUNITY
Start: 2023-02-16

## 2023-02-20 DIAGNOSIS — I10 ESSENTIAL HYPERTENSION: Chronic | ICD-10-CM

## 2023-02-20 RX ORDER — LOSARTAN POTASSIUM 50 MG/1
TABLET ORAL
Qty: 90 TABLET | Refills: 1 | Status: SHIPPED | OUTPATIENT
Start: 2023-02-20

## 2023-03-18 ENCOUNTER — TELEPHONE (OUTPATIENT)
Dept: INTERNAL MEDICINE CLINIC | Facility: CLINIC | Age: 73
End: 2023-03-18

## 2023-03-18 NOTE — TELEPHONE ENCOUNTER
Patient called stating that he had high fever yesterday vomiting diarrhea body aches, he had abdominal pain that resolved. Today feels achy, fatigue, vomiting and diarrhea resolved. Advise him sounds like symptoms sounds viral, keep up with the fluids, avoid fruits and vegetables for couple days, bland diet. Report if symptoms change. He denies dysuria or flank pain. Patient has appointment with PCP next week.

## 2023-03-21 ENCOUNTER — LAB ENCOUNTER (OUTPATIENT)
Dept: LAB | Age: 73
End: 2023-03-21
Attending: INTERNAL MEDICINE
Payer: MEDICARE

## 2023-03-21 DIAGNOSIS — E78.2 MIXED HYPERLIPIDEMIA: ICD-10-CM

## 2023-03-21 DIAGNOSIS — E11.9 TYPE 2 DIABETES MELLITUS WITHOUT COMPLICATION, WITHOUT LONG-TERM CURRENT USE OF INSULIN (HCC): ICD-10-CM

## 2023-03-21 LAB
ALBUMIN SERPL-MCNC: 3.4 G/DL (ref 3.4–5)
ALBUMIN/GLOB SERPL: 1 {RATIO} (ref 1–2)
ALP LIVER SERPL-CCNC: 33 U/L
ALT SERPL-CCNC: 40 U/L
ANION GAP SERPL CALC-SCNC: 10 MMOL/L (ref 0–18)
AST SERPL-CCNC: 29 U/L (ref 15–37)
BASOPHILS # BLD AUTO: 0.04 X10(3) UL (ref 0–0.2)
BASOPHILS NFR BLD AUTO: 0.6 %
BILIRUB SERPL-MCNC: 0.5 MG/DL (ref 0.1–2)
BUN BLD-MCNC: 17 MG/DL (ref 7–18)
BUN/CREAT SERPL: 17.3 (ref 10–20)
CALCIUM BLD-MCNC: 9.3 MG/DL (ref 8.5–10.1)
CHLORIDE SERPL-SCNC: 112 MMOL/L (ref 98–112)
CHOLEST SERPL-MCNC: 76 MG/DL (ref ?–200)
CO2 SERPL-SCNC: 21 MMOL/L (ref 21–32)
CREAT BLD-MCNC: 0.98 MG/DL
DEPRECATED RDW RBC AUTO: 43.5 FL (ref 35.1–46.3)
EOSINOPHIL # BLD AUTO: 0.2 X10(3) UL (ref 0–0.7)
EOSINOPHIL NFR BLD AUTO: 3.2 %
ERYTHROCYTE [DISTWIDTH] IN BLOOD BY AUTOMATED COUNT: 13.6 % (ref 11–15)
EST. AVERAGE GLUCOSE BLD GHB EST-MCNC: 157 MG/DL (ref 68–126)
FASTING PATIENT LIPID ANSWER: YES
FASTING STATUS PATIENT QL REPORTED: YES
GFR SERPLBLD BASED ON 1.73 SQ M-ARVRAT: 81 ML/MIN/1.73M2 (ref 60–?)
GLOBULIN PLAS-MCNC: 3.4 G/DL (ref 2.8–4.4)
GLUCOSE BLD-MCNC: 138 MG/DL (ref 70–99)
HBA1C MFR BLD: 7.1 % (ref ?–5.7)
HCT VFR BLD AUTO: 39.9 %
HDLC SERPL-MCNC: 17 MG/DL (ref 40–59)
HGB BLD-MCNC: 13.4 G/DL
IMM GRANULOCYTES # BLD AUTO: 0.02 X10(3) UL (ref 0–1)
IMM GRANULOCYTES NFR BLD: 0.3 %
LDLC SERPL CALC-MCNC: 33 MG/DL (ref ?–100)
LYMPHOCYTES # BLD AUTO: 2.65 X10(3) UL (ref 1–4)
LYMPHOCYTES NFR BLD AUTO: 42.5 %
MCH RBC QN AUTO: 29.2 PG (ref 26–34)
MCHC RBC AUTO-ENTMCNC: 33.6 G/DL (ref 31–37)
MCV RBC AUTO: 86.9 FL
MONOCYTES # BLD AUTO: 0.6 X10(3) UL (ref 0.1–1)
MONOCYTES NFR BLD AUTO: 9.6 %
NEUTROPHILS # BLD AUTO: 2.72 X10 (3) UL (ref 1.5–7.7)
NEUTROPHILS # BLD AUTO: 2.72 X10(3) UL (ref 1.5–7.7)
NEUTROPHILS NFR BLD AUTO: 43.8 %
NONHDLC SERPL-MCNC: 59 MG/DL (ref ?–130)
OSMOLALITY SERPL CALC.SUM OF ELEC: 300 MOSM/KG (ref 275–295)
PLATELET # BLD AUTO: 225 10(3)UL (ref 150–450)
POTASSIUM SERPL-SCNC: 4 MMOL/L (ref 3.5–5.1)
PROT SERPL-MCNC: 6.8 G/DL (ref 6.4–8.2)
RBC # BLD AUTO: 4.59 X10(6)UL
SODIUM SERPL-SCNC: 143 MMOL/L (ref 136–145)
TRIGL SERPL-MCNC: 153 MG/DL (ref 30–149)
VLDLC SERPL CALC-MCNC: 21 MG/DL (ref 0–30)
WBC # BLD AUTO: 6.2 X10(3) UL (ref 4–11)

## 2023-03-21 PROCEDURE — 80053 COMPREHEN METABOLIC PANEL: CPT

## 2023-03-21 PROCEDURE — 83036 HEMOGLOBIN GLYCOSYLATED A1C: CPT

## 2023-03-21 PROCEDURE — 85025 COMPLETE CBC W/AUTO DIFF WBC: CPT

## 2023-03-21 PROCEDURE — 80061 LIPID PANEL: CPT

## 2023-03-21 PROCEDURE — 36415 COLL VENOUS BLD VENIPUNCTURE: CPT

## 2023-03-23 ENCOUNTER — OFFICE VISIT (OUTPATIENT)
Dept: INTERNAL MEDICINE CLINIC | Facility: CLINIC | Age: 73
End: 2023-03-23

## 2023-03-23 VITALS
BODY MASS INDEX: 34.79 KG/M2 | HEIGHT: 70 IN | OXYGEN SATURATION: 96 % | WEIGHT: 243 LBS | DIASTOLIC BLOOD PRESSURE: 72 MMHG | HEART RATE: 59 BPM | SYSTOLIC BLOOD PRESSURE: 125 MMHG | RESPIRATION RATE: 16 BRPM

## 2023-03-23 DIAGNOSIS — E11.9 TYPE 2 DIABETES MELLITUS WITHOUT COMPLICATION, WITHOUT LONG-TERM CURRENT USE OF INSULIN (HCC): ICD-10-CM

## 2023-03-23 DIAGNOSIS — L30.9 DERMATITIS: Primary | ICD-10-CM

## 2023-03-23 DIAGNOSIS — I10 ESSENTIAL HYPERTENSION: Chronic | ICD-10-CM

## 2023-03-23 PROBLEM — E66.01 CLASS 2 SEVERE OBESITY DUE TO EXCESS CALORIES WITH SERIOUS COMORBIDITY AND BODY MASS INDEX (BMI) OF 35.0 TO 35.9 IN ADULT: Status: RESOLVED | Noted: 2022-09-15 | Resolved: 2023-03-23

## 2023-03-23 PROBLEM — E66.01 CLASS 2 SEVERE OBESITY DUE TO EXCESS CALORIES WITH SERIOUS COMORBIDITY AND BODY MASS INDEX (BMI) OF 35.0 TO 35.9 IN ADULT  (HCC): Status: RESOLVED | Noted: 2022-09-15 | Resolved: 2023-03-23

## 2023-03-23 PROBLEM — E66.812 CLASS 2 SEVERE OBESITY DUE TO EXCESS CALORIES WITH SERIOUS COMORBIDITY AND BODY MASS INDEX (BMI) OF 35.0 TO 35.9 IN ADULT (HCC): Status: RESOLVED | Noted: 2022-09-15 | Resolved: 2023-03-23

## 2023-03-23 PROBLEM — E66.01 CLASS 2 SEVERE OBESITY DUE TO EXCESS CALORIES WITH SERIOUS COMORBIDITY AND BODY MASS INDEX (BMI) OF 35.0 TO 35.9 IN ADULT (HCC): Status: RESOLVED | Noted: 2022-09-15 | Resolved: 2023-03-23

## 2023-03-23 LAB
CREAT UR-SCNC: 52.4 MG/DL
MICROALBUMIN UR-MCNC: 1.04 MG/DL
MICROALBUMIN/CREAT 24H UR-RTO: 19.8 UG/MG (ref ?–30)

## 2023-03-23 PROCEDURE — 99214 OFFICE O/P EST MOD 30 MIN: CPT | Performed by: INTERNAL MEDICINE

## 2023-03-23 PROCEDURE — 1126F AMNT PAIN NOTED NONE PRSNT: CPT | Performed by: INTERNAL MEDICINE

## 2023-03-23 NOTE — PATIENT INSTRUCTIONS
Fodmap diet  Foods that cause gas -- Certain foods contain specific carbohydrates called \"FODMAPs\" (fermentable oligo-, di-, and monosaccharides and polyols). FODMAPs are poorly absorbed and can result in bloating and gas production in some people. A diet that is low in FODMAPs (which are found in wheat, barley, milk, and certain fruits and vegetables, among other foods) may reduce the amount of gas you produce. It's a good idea to talk to your health care provider if you want to try limiting or avoiding certain foods. He or she can give you guidance on what to limit and how to make sure you're still getting enough nutrients in your diet. Starch and soluble fiber can also contribute increase gas. Potatoes, corn, noodles, and wheat produce gas, while rice does not. Soluble fiber (found in oat bran, peas and other legumes, beans, and most fruit) also causes gas. Some laxatives contain soluble fiber and may cause gas, particularly during the first few weeks of use. You can also try low dose Imodium. Please see your eye doctor.

## 2023-03-23 NOTE — ASSESSMENT & PLAN NOTE
I reviewed the dermatologist\"s note from Dr. Nora Patel. He diagnosed the problem as eczema and prescribed steroid and urea cream.  Patient says he saw another dermatologist (I do not have a record of this) and was told that the problem was due to a drug reaction. He tried stopping the metoprolol but the problem did not improve so he resumed it. The other medications that he takes include atorvastatin, fenofibrate, losartan, and metformin. He does need all of these medications except perhaps the fenofibrate. I will refer him to another dermatologist for another opinion as to whether they think that this is a medication related reaction.

## 2023-04-03 ENCOUNTER — OFFICE VISIT (OUTPATIENT)
Dept: DERMATOLOGY CLINIC | Facility: CLINIC | Age: 73
End: 2023-04-03

## 2023-04-03 ENCOUNTER — TELEPHONE (OUTPATIENT)
Dept: DERMATOLOGY CLINIC | Facility: CLINIC | Age: 73
End: 2023-04-03

## 2023-04-03 DIAGNOSIS — B35.3 TINEA PEDIS OF BOTH FEET: ICD-10-CM

## 2023-04-03 DIAGNOSIS — L30.9 ECZEMA, UNSPECIFIED TYPE: Primary | ICD-10-CM

## 2023-04-03 PROCEDURE — 99204 OFFICE O/P NEW MOD 45 MIN: CPT | Performed by: STUDENT IN AN ORGANIZED HEALTH CARE EDUCATION/TRAINING PROGRAM

## 2023-04-03 RX ORDER — CLOBETASOL PROPIONATE 0.5 MG/G
OINTMENT TOPICAL
Qty: 30 G | Refills: 2 | Status: SHIPPED | OUTPATIENT
Start: 2023-04-03 | End: 2023-04-03

## 2023-04-03 RX ORDER — KETOCONAZOLE 20 MG/G
CREAM TOPICAL
Qty: 60 G | Refills: 3 | Status: SHIPPED | OUTPATIENT
Start: 2023-04-03 | End: 2023-04-03

## 2023-04-03 RX ORDER — CLOBETASOL PROPIONATE 0.5 MG/G
OINTMENT TOPICAL
Qty: 30 G | Refills: 2 | Status: SHIPPED | OUTPATIENT
Start: 2023-04-03

## 2023-04-03 RX ORDER — KETOCONAZOLE 20 MG/G
CREAM TOPICAL
Qty: 60 G | Refills: 3 | Status: SHIPPED | OUTPATIENT
Start: 2023-04-03

## 2023-04-03 NOTE — PATIENT INSTRUCTIONS
- Use ketoconazole on feet and between toes twice daily for 4 weeks  - After 4 weeks start blobetasol ointment to affected areas twice daily

## 2023-04-03 NOTE — TELEPHONE ENCOUNTER
Prescriptions transferred to [facility] pharmacy per pt request for use of GoodRx coupon.

## 2023-04-03 NOTE — TELEPHONE ENCOUNTER
Patient called    Asking to have both Rx's printed and sent to his mychart if possible.  Please advise

## 2023-06-06 ENCOUNTER — OFFICE VISIT (OUTPATIENT)
Dept: DERMATOLOGY CLINIC | Facility: CLINIC | Age: 73
End: 2023-06-06

## 2023-06-06 DIAGNOSIS — D18.01 CHERRY ANGIOMA: ICD-10-CM

## 2023-06-06 DIAGNOSIS — L81.4 LENTIGINES: ICD-10-CM

## 2023-06-06 DIAGNOSIS — B35.3 TINEA PEDIS OF BOTH FEET: ICD-10-CM

## 2023-06-06 DIAGNOSIS — L57.0 MULTIPLE ACTINIC KERATOSES: ICD-10-CM

## 2023-06-06 DIAGNOSIS — L85.9 HYPERKERATOSIS: Primary | ICD-10-CM

## 2023-06-06 DIAGNOSIS — L82.1 SEBORRHEIC KERATOSES: ICD-10-CM

## 2023-06-06 DIAGNOSIS — D22.9 MULTIPLE BENIGN NEVI: ICD-10-CM

## 2023-06-06 PROCEDURE — 17000 DESTRUCT PREMALG LESION: CPT | Performed by: STUDENT IN AN ORGANIZED HEALTH CARE EDUCATION/TRAINING PROGRAM

## 2023-06-06 PROCEDURE — 17003 DESTRUCT PREMALG LES 2-14: CPT | Performed by: STUDENT IN AN ORGANIZED HEALTH CARE EDUCATION/TRAINING PROGRAM

## 2023-06-06 PROCEDURE — 99214 OFFICE O/P EST MOD 30 MIN: CPT | Performed by: STUDENT IN AN ORGANIZED HEALTH CARE EDUCATION/TRAINING PROGRAM

## 2023-06-20 ENCOUNTER — OFFICE VISIT (OUTPATIENT)
Dept: SURGERY | Facility: CLINIC | Age: 73
End: 2023-06-20

## 2023-06-20 DIAGNOSIS — N52.01 ERECTILE DYSFUNCTION DUE TO ARTERIAL INSUFFICIENCY: ICD-10-CM

## 2023-06-20 DIAGNOSIS — N40.2 PROSTATE NODULE: ICD-10-CM

## 2023-06-20 DIAGNOSIS — R35.1 BENIGN PROSTATIC HYPERPLASIA WITH NOCTURIA: ICD-10-CM

## 2023-06-20 DIAGNOSIS — R97.20 ELEVATED PSA: Primary | ICD-10-CM

## 2023-06-20 DIAGNOSIS — N40.1 BENIGN PROSTATIC HYPERPLASIA WITH NOCTURIA: ICD-10-CM

## 2023-06-20 PROCEDURE — 99214 OFFICE O/P EST MOD 30 MIN: CPT | Performed by: UROLOGY

## 2023-06-20 RX ORDER — TADALAFIL 20 MG/1
20 TABLET ORAL
Qty: 4 TABLET | Refills: 11 | Status: SHIPPED | OUTPATIENT
Start: 2023-06-20

## 2023-07-27 DIAGNOSIS — E78.2 MIXED HYPERLIPIDEMIA: ICD-10-CM

## 2023-07-28 RX ORDER — FENOFIBRATE 160 MG/1
160 TABLET ORAL EVERY EVENING
Qty: 90 TABLET | Refills: 3 | Status: SHIPPED | OUTPATIENT
Start: 2023-07-28

## 2023-07-28 NOTE — TELEPHONE ENCOUNTER
Refill passed per [place] [facility] protocol.     Requested Prescriptions   Pending Prescriptions Disp Refills    FENOFIBRATE 160 MG Oral Tab [Pharmacy Med Name: FENOFIBRATE 160MG TABLETS] 90 tablet 1     Sig: TAKE 1 TABLET(160 MG) BY MOUTH EVERY EVENING       Cholesterol Medication Protocol Passed - 7/27/2023  6:00 AM        Passed - ALT in past 12 months        Passed - LDL in past 12 months        Passed - Last ALT < 80     Lab Results   Component Value Date    ALT 40 03/21/2023             Passed - Last LDL < 130     Lab Results   Component Value Date    LDL 33 03/21/2023             Passed - In person appointment or virtual visit in the past 12 mos or appointment in next 3 mos     Recent Outpatient Visits              1 month ago Elevated PSA    [address] [facility], Sammie Rodriguez MD    Office Visit    1 month ago [address], [address] [facility], [place] Pino Mariano MD    Office Visit    3 months ago Eczema, unspecified type    [address], [place] Pino Mariano MD    Office Visit    4 months ago Dermatitis    [address]    Office Visit    7 months ago Elevated PSA    [address], [address], [place], Penny Mckeon MD    Office Visit          Future Appointments         Provider Department Appt Notes    In 1 month Glory Chan MD [address], 12 [facility], [address] [place] [address] physical, appt made by wife    In 1 months Pino Mariano MD [address], [address] [facility]ree [place] 6 mo follow up    In 4 months Marimar Slater MD [address], [address], [address] 6 month    In 5 months Shante De La Torre MD [address], [address], [place] stomach pain                  Future Appointments         Provider Department Appt Notes    In 1 month Glory Chan MD [facility], [address], [place] medicare physical, appt made by wife    In 1 months Yael Forrest MD [address], [address] [facility] 6 mo follow up    In 4 months Evon Anglin MD [address], [address], [facility] 6 month    In 5 months Darin De La Torre MD [address], [address], [place] stomach pain          Recent Outpatient Visits              1 month ago Elevated PSA    [address], [address] [facility],[address], Jamaal [place] MD    Office Visit    1 month ago Monet Deleon MD    Office Visit    3 months ago Eczema, unspecified type    [address] [facility], [address], [place] Yael Forrest MD    Office Visit    4 months ago Dermatitis    Chris Pappas MD    Office Visit    7 months ago Elevated PSA    Jamaal Pappas MD    Office Visit

## 2023-08-15 ENCOUNTER — OFFICE VISIT (OUTPATIENT)
Dept: DERMATOLOGY CLINIC | Facility: CLINIC | Age: 73
End: 2023-08-15

## 2023-08-15 DIAGNOSIS — L30.9 DERMATITIS: Primary | ICD-10-CM

## 2023-08-15 PROCEDURE — 99214 OFFICE O/P EST MOD 30 MIN: CPT | Performed by: STUDENT IN AN ORGANIZED HEALTH CARE EDUCATION/TRAINING PROGRAM

## 2023-08-15 PROCEDURE — 1126F AMNT PAIN NOTED NONE PRSNT: CPT | Performed by: STUDENT IN AN ORGANIZED HEALTH CARE EDUCATION/TRAINING PROGRAM

## 2023-08-15 RX ORDER — UREA 40 %
CREAM (GRAM) TOPICAL
COMMUNITY
Start: 2023-06-07

## 2023-08-15 NOTE — PROGRESS NOTES
August 15, 2023    Established patient     CHIEF COMPLAINT: Hyperkeratosis f/u    HISTORY OF PRESENT ILLNESS: .    1. Hyperkeratosis f/u  Location: Bilateral feet   Duration: 1 year  Signs and symptoms: Itching, cracking of skin  Current treatment: Urea 40% cream  Past treatments: Ketoconazole cream    DERM HISTORY:  History of skin cancer: No  History of chronic skin disease/condition: Yes    FAMILY HISTORY:  History of melanoma: No  History of chronic skin disease/condition: No    History/Other:    REVIEW OF SYSTEMS:  Constitutional: Denies fever, chills, unintentional weight loss. Skin as per HPI    PAST MEDICAL HISTORY:  Past Medical History:   Diagnosis Date    Allergic rhinitis     Per NG: desensitization: mold and dust mite    BPH (benign prostatic hyperplasia)     Diabetes ([facility] [address]     Esophageal reflux     Family history of malignant neoplasm of prostate 11/30/2009    Fx wrist     Per NG: Fx both wrists    Hemorrhoids     High blood pressure     High cholesterol     Other and unspecified hyperlipidemia     Peptic ulcer disease     Sleep apnea     USES CPAP    Stented coronary artery     Visual impairment     READERS       Medications  Current Outpatient Medications   Medication Sig Dispense Refill    Multiple Vitamin (MULTI-VITAMIN) Oral Tab multivitamin tablet, [RxNorm: 0]      Urea 40 % External Cream USE ONCE DAILY ON FEET AS DIRECTED      Fenofibrate 160 MG Oral Tab Take 1 tablet (160 mg total) by mouth every evening. 90 tablet 3    metFORMIN  MG Oral Tablet 24 Hr Take 2 tablets (1,000 mg total) by mouth daily with breakfast. 180 tablet 3    atorvastatin 20 MG Oral Tab Take 1 tablet (20 mg total) by mouth daily. 90 tablet 3    LOSARTAN 50 MG Oral Tab TAKE 1 TABLET(50 MG) BY MOUTH DAILY 90 tablet 1    metoprolol succinate ER 25 MG Oral Tablet 24 Hr Take 1 tablet (25 mg total) by mouth Daily Beta Blocker. 90 tablet 1    ASPIRIN 81 OR Take 81 mg by mouth daily.  Stopped 7x days prior to procedure 10/7 Multiple Vitamins-Minerals (MULTIVITAL) Oral Tab Take 1 tablet by mouth daily. Tadalafil 20 MG Oral Tab Take 1 tablet (20 mg total) by mouth daily as needed for Erectile Dysfunction. (Patient not taking: Reported on 8/15/2023) 4 tablet 11    clobetasol 0.05 % External Ointment After using ketoconazole for 4 weeks start this ointment twice daily  Monday-Friday (Patient not taking: Reported on 8/15/2023) 30 g 2    ketoconazole 2 % External Cream Apply to affected area 2 times daily for 4 weeks. Use between toes as well. (Patient not taking: Reported on 8/15/2023) 60 g 3    Psyllium (METAMUCIL) 0.36 g Oral Cap Take 1 capsule orally once a day. (Patient not taking: Reported on 8/15/2023)      triamcinolone acetonide 0.1 % External Ointment Apply to AA- Rash (Patient not taking: Reported on 4/3/2023)      Fluocinonide 0.05 % External Ointment Apply to AA - rash (Patient not taking: Reported on 4/3/2023)         Objective:    PHYSICAL EXAM:  General: awake, alert, no acute distress  Skin: Skin exam was performed today including the following: Feet and hands. Pertinent findings include:   - L hand with a pink scaly plaques  - Feet with hyperkeratosis, erythematous plaques and some fisuring     ASSESSMENT & PLAN:  Pathophysiology of diagnoses discussed with patient. Therapeutic options reviewed. Risks, benefits, and alternatives discussed with patient. Instructions reviewed at length. #Dermatitis and hyperkeratosis - clobetasol not yet started and having improvement with urea alone. Will plan to treat underlying dermatitis with mometasone 0.5%  - mometasone 0.1% twice daily to affected areas Monday-Friday. Take weekends off. Avoid use on face, breasts, groin, or axillae. Mix half and half to feet.      Return to clinic: 2 months  or sooner if something concerning arises     Aung Card MD

## 2023-09-05 DIAGNOSIS — I10 ESSENTIAL HYPERTENSION: Chronic | ICD-10-CM

## 2023-09-06 RX ORDER — LOSARTAN POTASSIUM 50 MG/1
50 TABLET ORAL DAILY
Qty: 90 TABLET | Refills: 1 | Status: SHIPPED | OUTPATIENT
Start: 2023-09-06

## 2023-09-06 NOTE — TELEPHONE ENCOUNTER
Refill passed per [facility] protocol.       Requested Prescriptions   Pending Prescriptions Disp Refills    LOSARTAN 50 MG Oral Tab [Pharmacy Med Name: LOSARTAN 50MG TABLETS] 90 tablet 1     Sig: TAKE 1 TABLET(50 MG) BY MOUTH DAILY       Hypertensive Medications Protocol Passed - 9/5/2023  7:50 PM        Passed - In person appointment in the past 12 or next 3 months     Recent Outpatient Visits              3 weeks ago Dermatitis    Darnell Grimaldo MD    Office Visit    2 months ago Elevated PSA    [address], Yris Yarbrough MD    Office Visit    3 months ago Hyperkeratosis    [address], [address] [facility] Katrina Adams MD    Office Visit    5 months ago Eczema, unspecified type    [address], [facility], [place] Katrina Adams MD    Office Visit    5 months ago Dermatitis    [address], [facility], Nicky Grubbs MD    Office Visit          Future Appointments         Provider Department Appt Notes    In 1 week [facility] MD Seth [address], [address] [place] [address], [address] [place] [address], appt made by wife    In 2 month Katrina Adams MD [address] 2 mo follow up    In 3 months Katrina Adams MD [address], [address] [place] [address] 6 mo follow up    In 3 months Olga Napier MD [address], [address] [facility],[address], [place] 6 month    In 4 months Josias De La Torre MD [address], [address] [facility], [place] stomach pain               Passed - Last BP reading less than 140/90     BP Readings from Last 1 Encounters:  03/23/23 : 125/72              Passed - CMP or BMP in past 6 months     Recent Results (from the past 4392 hour(s))   COMP METABOLIC PANEL (14)    Collection Time: 03/21/23 12:27 PM   Result Value Ref Range Glucose 138 (H) 70 - 99 mg/dL    Sodium 143 136 - 145 mmol/L    Potassium 4.0 3.5 - 5.1 mmol/L    Chloride 112 98 - 112 mmol/L    CO2 21.0 21.0 - 32.0 mmol/L    Anion Gap 10 0 - 18 mmol/L    BUN 17 7 - 18 mg/dL    Creatinine 0.98 0.70 - 1.30 mg/dL    BUN/CREA Ratio 17.3 10.0 - 20.0    Calcium, Total 9.3 8.5 - 10.1 mg/dL    Calculated Osmolality 300 (H) 275 - 295 mOsm/kg    eGFR-Cr 81 >=60 mL/min/1.73m2    ALT 40 16 - 61 U/L    AST 29 15 - 37 U/L    Alkaline Phosphatase 33 (L) 45 - 117 U/L    Bilirubin, Total 0.5 0.1 - 2.0 mg/dL    Total Protein 6.8 6.4 - 8.2 g/dL    Albumin 3.4 3.4 - 5.0 g/dL    Globulin  3.4 2.8 - 4.4 g/dL    A/G Ratio 1.0 1.0 - 2.0    Patient Fasting for CMP? Yes      *Note: Due to a large number of results and/or encounters for the requested time period, some results have not been displayed. A complete set of results can be found in Results Review.                Passed - In person appointment or virtual visit in the past 6 months     Recent Outpatient Visits              3 weeks ago Dermatitis    Eva Bullard MD    Office Visit    2 months ago Elevated PSA    Dana Parisi, [address] [facility],[address], Denise Call MD    Office Visit    3 months ago Ahsan Cooper, [address] [place] [address] Jimenez Velasquez MD    Office Visit    5 months ago Eczema, unspecified type    [facility]    Office Visit    5 months ago Dermatitis    [facility]    Office Visit          Future Appointments         Provider Department Appt Notes    In 1 week MD Dana August [facility] [address], [place] medicare physical, appt made by wife    In 2 month MD Dana Gallegos [facility] 2 mo follow up    In 3 months Brook Maryan Lassiter MD [facility], [address] [facility] 6 mo follow up    In 3 months Dennis Thrasher MD [address], [address], [place] 6 month    In 4 months Anabelle De La Torre MD [address], [address], [place] stomach pain               Passed - EGFRCR or GFRNAA > 50     GFR Evaluation  EGFRCR: 81 , resulted on 3/21/2023                Future Appointments         Provider Department Appt Notes    In 1 week Shara [place] MD [address], [address], [place] medicare physical, appt made by wife    In 2 month Radha Owusu MD [address], [facility] 2 mo follow up    In 3 months Radha Owusu MD [address], [address] [facility] 6 mo follow up    In 3 months Dennis Thrasher MD [address], [address], [place] 6 month    In 4 months Anabelle De La Torre MD [address], [address] stomach pain            Recent Outpatient Visits              3 weeks ago Dermatitis    Mandi Hutchins MD    Office Visit    2 months ago Elevated PSA    [address], [address], Nas Velazquez MD    Office Visit    3 months ago Azra Pereira MD    Office Visit    5 months ago Eczema, unspecified type    [address], [place] Radha Owusu MD    Office Visit    5 months ago Dermatitis    [address] [facility], Ayesha Rainey MD    Office Visit

## 2023-10-10 ENCOUNTER — OFFICE VISIT (OUTPATIENT)
Dept: DERMATOLOGY CLINIC | Facility: CLINIC | Age: 73
End: 2023-10-10

## 2023-10-10 ENCOUNTER — LAB ENCOUNTER (OUTPATIENT)
Dept: LAB | Age: 73
End: 2023-10-10
Attending: UROLOGY
Payer: MEDICARE

## 2023-10-10 DIAGNOSIS — R97.20 ELEVATED PSA: ICD-10-CM

## 2023-10-10 DIAGNOSIS — L30.9 DERMATITIS: Primary | ICD-10-CM

## 2023-10-10 LAB — PSA SERPL-MCNC: 5.92 NG/ML (ref ?–4)

## 2023-10-10 PROCEDURE — 99213 OFFICE O/P EST LOW 20 MIN: CPT | Performed by: STUDENT IN AN ORGANIZED HEALTH CARE EDUCATION/TRAINING PROGRAM

## 2023-10-10 PROCEDURE — 36415 COLL VENOUS BLD VENIPUNCTURE: CPT

## 2023-10-10 PROCEDURE — 84153 ASSAY OF PSA TOTAL: CPT

## 2023-10-10 PROCEDURE — 1126F AMNT PAIN NOTED NONE PRSNT: CPT | Performed by: STUDENT IN AN ORGANIZED HEALTH CARE EDUCATION/TRAINING PROGRAM

## 2023-10-10 RX ORDER — AMMONIUM LACTATE 12 G/100G
1 CREAM TOPICAL DAILY
Qty: 400 G | Refills: 11 | Status: SHIPPED | OUTPATIENT
Start: 2023-10-10 | End: 2023-12-09

## 2023-10-10 RX ORDER — MOMETASONE FUROATE 1 MG/G
OINTMENT TOPICAL
COMMUNITY
Start: 2023-08-15

## 2023-10-10 NOTE — PROGRESS NOTES
October 10, 2023    Established patient     CHIEF COMPLAINT: Hyperkeratosis     HISTORY OF PRESENT ILLNESS: .    1. Hyperkeratosis f/u  Location: Bilateral feet              Duration: 1 year  Signs and symptoms: Itching, cracking of skin  Current treatment: Mometasone 0.1% ointment  Past treatments: Ketoconazole cream    DERM HISTORY:  History of skin cancer: No  History of chronic skin disease/condition: No    FAMILY HISTORY:  History of melanoma: No  History of chronic skin disease/condition: No    History/Other:    REVIEW OF SYSTEMS:  Constitutional: Denies fever, chills, unintentional weight loss. Skin as per HPI    PAST MEDICAL HISTORY:  Past Medical History:   Diagnosis Date    Allergic rhinitis     Per NG: desensitization: mold and dust mite    BPH (benign prostatic hyperplasia)     Diabetes ([address]     Esophageal reflux     Family history of malignant neoplasm of prostate 11/30/2009    Fx wrist     Per NG: Fx both wrists    Hemorrhoids     High blood pressure     High cholesterol     Other and unspecified hyperlipidemia     Peptic ulcer disease     Sleep apnea     USES CPAP    Stented coronary artery     Visual impairment     READERS       Medications  Current Outpatient Medications   Medication Sig Dispense Refill    losartan 50 MG Oral Tab Take 1 tablet (50 mg total) by mouth daily. 90 tablet 1    Multiple Vitamin (MULTI-VITAMIN) Oral Tab multivitamin tablet, [RxNorm: 0]      Urea 40 % External Cream USE ONCE DAILY ON FEET AS DIRECTED      Fenofibrate 160 MG Oral Tab Take 1 tablet (160 mg total) by mouth every evening. 90 tablet 3    Tadalafil 20 MG Oral Tab Take 1 tablet (20 mg total) by mouth daily as needed for Erectile Dysfunction. (Patient not taking: Reported on 8/15/2023) 4 tablet 11    metFORMIN  MG Oral Tablet 24 Hr Take 2 tablets (1,000 mg total) by mouth daily with breakfast. 180 tablet 3    atorvastatin 20 MG Oral Tab Take 1 tablet (20 mg total) by mouth daily.  90 tablet 3    clobetasol 0.05 % External Ointment After using ketoconazole for 4 weeks start this ointment twice daily  Monday-Friday (Patient not taking: Reported on 8/15/2023) 30 g 2    ketoconazole 2 % External Cream Apply to affected area 2 times daily for 4 weeks. Use between toes as well. (Patient not taking: Reported on 8/15/2023) 60 g 3    Psyllium (METAMUCIL) 0.36 g Oral Cap Take 1 capsule orally once a day. (Patient not taking: Reported on 8/15/2023)      metoprolol succinate ER 25 MG Oral Tablet 24 Hr Take 1 tablet (25 mg total) by mouth Daily Beta Blocker. 90 tablet 1    triamcinolone acetonide 0.1 % External Ointment Apply to AA- Rash (Patient not taking: Reported on 4/3/2023)      Fluocinonide 0.05 % External Ointment Apply to AA - rash (Patient not taking: Reported on 4/3/2023)      ASPIRIN 81 OR Take 81 mg by mouth daily. Stopped 7x days prior to procedure 10/7      Multiple Vitamins-Minerals (MULTIVITAL) Oral Tab Take 1 tablet by mouth daily. Objective:    PHYSICAL EXAM:  General: awake, alert, no acute distress  Skin: Skin exam was performed today including the following: Feet. Pertinent findings include:   - with scaly plaques and mild erythema    ASSESSMENT & PLAN:  Pathophysiology of diagnoses discussed with patient. Therapeutic options reviewed. Risks, benefits, and alternatives discussed with patient. Instructions reviewed at length. #Dermatitis with hyperkeratosis  - Mometasone0.05% twice daily to affected areas Monday-Friday. Take weekends off. Avoid use on face, breasts, groin, or axillae.   - Switch from urea to lac-hydrin once daily     Return to clinic: 3 months or sooner if something concerning arises     Romana Jenkins, MD

## 2023-10-11 ENCOUNTER — TELEPHONE (OUTPATIENT)
Dept: DERMATOLOGY CLINIC | Facility: CLINIC | Age: 73
End: 2023-10-11

## 2023-10-11 NOTE — TELEPHONE ENCOUNTER
Call from [address] [place] [address]    Needs clarification on Ammonium Lactate (LAC-HYDRIN) 12 % External Cream     Please call

## 2023-10-17 ENCOUNTER — OFFICE VISIT (OUTPATIENT)
Facility: CLINIC | Age: 73
End: 2023-10-17

## 2023-10-17 VITALS
OXYGEN SATURATION: 97 % | BODY MASS INDEX: 34.5 KG/M2 | HEART RATE: 89 BPM | RESPIRATION RATE: 18 BRPM | WEIGHT: 241 LBS | HEIGHT: 70 IN | SYSTOLIC BLOOD PRESSURE: 126 MMHG | DIASTOLIC BLOOD PRESSURE: 78 MMHG

## 2023-10-17 DIAGNOSIS — I10 ESSENTIAL HYPERTENSION: Chronic | ICD-10-CM

## 2023-10-17 DIAGNOSIS — G47.33 OBSTRUCTIVE SLEEP APNEA ON CPAP: ICD-10-CM

## 2023-10-17 DIAGNOSIS — E11.9 TYPE 2 DIABETES MELLITUS WITHOUT COMPLICATION, WITHOUT LONG-TERM CURRENT USE OF INSULIN (HCC): ICD-10-CM

## 2023-10-17 DIAGNOSIS — R97.20 ELEVATED PSA: ICD-10-CM

## 2023-10-17 DIAGNOSIS — Z00.00 MEDICARE ANNUAL WELLNESS VISIT, SUBSEQUENT: Primary | ICD-10-CM

## 2023-10-17 LAB
CARTRIDGE LOT#: 653 NUMERIC
HEMOGLOBIN A1C: 7.1 % (ref 4.3–5.6)

## 2023-10-17 PROCEDURE — 1125F AMNT PAIN NOTED PAIN PRSNT: CPT | Performed by: INTERNAL MEDICINE

## 2023-10-17 PROCEDURE — G0439 PPPS, SUBSEQ VISIT: HCPCS | Performed by: INTERNAL MEDICINE

## 2023-10-17 PROCEDURE — G0008 ADMIN INFLUENZA VIRUS VAC: HCPCS | Performed by: INTERNAL MEDICINE

## 2023-10-17 PROCEDURE — 83036 HEMOGLOBIN GLYCOSYLATED A1C: CPT | Performed by: INTERNAL MEDICINE

## 2023-10-17 PROCEDURE — 90662 IIV NO PRSV INCREASED AG IM: CPT | Performed by: INTERNAL MEDICINE

## 2023-10-17 PROCEDURE — 99214 OFFICE O/P EST MOD 30 MIN: CPT | Performed by: INTERNAL MEDICINE

## 2023-10-17 NOTE — PATIENT INSTRUCTIONS
Lizzy Rosas's SCREENING SCHEDULE   Tests on this list are recommended by your physician but may not be covered, or covered at this frequency, by your insurer. Please check with your insurance carrier before scheduling to verify coverage.    PREVENTATIVE SERVICES FREQUENCY &  COVERAGE DETAILS LAST COMPLETION DATE   Diabetes Screening    Fasting Blood Sugar / Glucose    One screening every 12 months if never tested or if previously tested but not diagnosed with pre-diabetes   One screening every 6 months if diagnosed with pre-diabetes Lab Results   Component Value Date     (H) 03/21/2023        Cardiovascular Disease Screening    Lipid Panel  Cholesterol  Lipoprotein (HDL)  Triglycerides Covered every 5 years for all Medicare beneficiaries without apparent signs or symptoms of cardiovascular disease Lab Results   Component Value Date    CHOLEST 76 03/21/2023    HDL 17 (L) 03/21/2023    LDL 33 03/21/2023    TRIG 153 (H) 03/21/2023         Electrocardiogram (EKG)   Covered if needed at Welcome to Medicare, and non-screening if indicated for medical reasons 11/12/2021      Ultrasound Screening for Abdominal Aortic Aneurysm (AAA) Covered once in a lifetime for one of the following risk factors    Men who are 73-68 years old and have ever smoked    Anyone with a family history -     Colorectal Cancer Screening  Covered for ages 52-80; only need ONE of the following:    Colonoscopy   Covered every 10 years    Covered every 2 years if patient is at high risk or previous colonoscopy was abnormal 04/11/2018    Colorectal Cancer Screening due on 04/11/2028    Flexible Sigmoidoscopy   Covered every 4 years -    Fecal Occult Blood Test Covered annually -   Prostate Cancer Screening    Prostate-Specific Antigen (PSA) Annually Lab Results   Component Value Date    PSA 5.92 (H) 10/10/2023     PSA due on 10/10/2024   Immunizations    Influenza Covered once per flu season  Please get every year -  Influenza Vaccine(1) due on 10/01/2023    Pneumococcal Each vaccine ([id number] & [id number]) covered once after 65 Prevnar 13: 01/12/2017    [id number]: 02/28/2018     No recommendations at this time    Hepatitis B One screening covered for patients with certain risk factors   -  No recommendations at this time    Tetanus Toxoid Not covered by Medicare Part B unless medically necessary (cut with metal); may be covered with your pharmacy prescription benefits -    Tetanus, Diptheria and Pertusis TD and TDaP Not covered by Medicare Part B -  No recommendations at this time    Zoster Not covered by Medicare Part B; may be covered with your pharmacy  prescription benefits -  No recommendations at this time     Diabetes      Hemoglobin A1C Annually; if last result is elevated, may be asked to retest more frequently. Medicare covers every 3 months Lab Results   Component Value Date     (H) 03/21/2023    A1C 7.1 (H) 03/21/2023       Hemoglobin A1C Test due on 09/21/2023    Creat/alb ratio Annually Lab Results   Component Value Date    MICROALBCREA 19.8 03/23/2023       LDL Annually Lab Results   Component Value Date    LDL 33 03/21/2023       Dilated Eye Exam Annually Last Diabetic Eye Exam:  No data recorded  No data recorded       Annual Monitoring of Persistent Medications (ACE/ARB, digoxin diuretics, anticonvulsants)    Potassium Annually Lab Results   Component Value Date    K 4.0 03/21/2023         Creatinine   Annually Lab Results   Component Value Date    CREATSERUM 0.98 03/21/2023         BUN Annually Lab Results   Component Value Date    BUN 17 03/21/2023       Drug Serum Conc Annually No results found for: \"DIGOXIN\", \"DIG\", \"VALP\"         You are due for follow-up and annual blood tests in March. My last day will  be January 11, 2024.   I recommend the following providers:    [facility] (Internal Medicine) [phone]  Dr. Leslie Walker Dr. Viviano Bumpers

## 2023-10-25 DIAGNOSIS — E78.2 MIXED HYPERLIPIDEMIA: ICD-10-CM

## 2023-10-25 RX ORDER — FENOFIBRATE 160 MG/1
160 TABLET ORAL EVERY EVENING
Qty: 90 TABLET | Refills: 3 | Status: SHIPPED | OUTPATIENT
Start: 2023-10-25

## 2023-10-25 NOTE — TELEPHONE ENCOUNTER
Refill passed per [place] [facility] protocol.     Requested Prescriptions   Pending Prescriptions Disp Refills    FENOFIBRATE 160 MG Oral Tab [Pharmacy Med Name: FENOFIBRATE 160MG TABLETS] 90 tablet 3     Sig: TAKE 1 TABLET(160 MG) BY MOUTH EVERY EVENING       Cholesterol Medication Protocol Passed - 10/25/2023  9:50 AM        Passed - ALT in past 12 months        Passed - LDL in past 12 months        Passed - Last ALT < 80     Lab Results   Component Value Date    ALT 40 03/21/2023             Passed - Last LDL < 130     Lab Results   Component Value Date    LDL 33 03/21/2023             Passed - In person appointment or virtual visit in the past 12 mos or appointment in next 3 mos     Recent Outpatient Visits              1 week ago Medicare annual wellness visit, subsequent    Brynn Li MD    Office Visit    2 weeks ago Dermatitis    Sheldon Latham MD    Office Visit    2 months ago Dermatitis    Sheldon Latham MD    Office Visit    4 months ago Elevated PSA    [place], [address], Sharee Carlos MD    Office Visit    4 months ago [address], [address] Dwight Aguilar MD    Office Visit          Future Appointments         Provider Department Appt Notes    In 1 month MD Belgica Portillo, Margarita Head 6 mo follow up    In 1 month MD Belgica Richards, [address], [place] 6 month    In 2 months Shante De La Torre MD Roselie Lav, [address], [place] stomach pain    In 2 months MD [place] Portillo [place] 3 month f/u                           Recent Outpatient Visits              1 week ago Tawny Vasquez annual wellness visit, subsequent    Joe Cordoba MD    Office Visit    2 weeks ago Dermatitis    Frances Altman MD    Office Visit    2 months ago Dermatitis    [address], [address] Margarita Aguilar MD    Office Visit    4 months ago Elevated PSA    [address] [facility], Charlene Bnenett MD    Office Visit    4 months ago Hyperkeratosis    [address], [address] Margarita Aguilar MD    Office Visit            Future Appointments         Provider Department Appt Notes    In 1 month Santosh Singh MD [address], [address] Margarita Aguilar 6 mo follow up    In 1 month Adriana Vega MD [address], [address], [employer] 6 month    In 2 months Shante De La Torre MD [address], [address], [place] stomach pain    In 2 months Santosh Singh MD [address], [address] Margarita Aguilar 3 month f/u

## 2023-12-04 ENCOUNTER — OFFICE VISIT (OUTPATIENT)
Facility: CLINIC | Age: 73
End: 2023-12-04
Payer: MEDICARE

## 2023-12-04 VITALS
HEART RATE: 62 BPM | HEIGHT: 70 IN | DIASTOLIC BLOOD PRESSURE: 83 MMHG | SYSTOLIC BLOOD PRESSURE: 153 MMHG | BODY MASS INDEX: 34.5 KG/M2 | WEIGHT: 241 LBS

## 2023-12-04 DIAGNOSIS — M62.08 DIASTASIS OF RECTUS ABDOMINIS: ICD-10-CM

## 2023-12-04 DIAGNOSIS — R10.33 PERIUMBILICAL DISCOMFORT: Primary | ICD-10-CM

## 2023-12-04 DIAGNOSIS — K43.9 VENTRAL HERNIA WITHOUT OBSTRUCTION OR GANGRENE: ICD-10-CM

## 2023-12-04 PROCEDURE — 99203 OFFICE O/P NEW LOW 30 MIN: CPT | Performed by: INTERNAL MEDICINE

## 2023-12-04 NOTE — PROGRESS NOTES
Subjective:   Patient ID: Clair Martinez is a 74year old male. HPI  Ryan Blankenship returns in follow-up. He was last seen at endoscopy in April 2018. As per previous notes Ryan Blankenship has history of chronic gastroesophageal reflux with previously documented erosive esophagitis. Upper endoscopy performed for chronic reflux and an abnormal CT scan (esophageal wall thickening) in 2018 revealed a small hiatal hernia, a normal-appearing esophagus and H. pylori negative gastric erosions. Note the was taking on-demand as opposed to maintenance acid suppressive therapy at the time of endoscopy. A screening colonoscopy was normal with the exception of sigmoid colon diverticulosis. The colonoscopic preparation was good. Repeat colonoscopy was to be dependent on colonic symptoms. Ryan Blankenship states that approximately 1 year prior he noted burning epigastric discomfort \"even with or a little below\" the umbilicus. There was no change with eating or bowel movements. The patient relates that the symptoms may have started after an [place] meal.  He also noted coexistent situational stress. He is no longer taking a proton pump inhibitor. He was concerned that \"my ulcer may be coming back\". He took Tums for a few weeks with resolution of symptoms. The symptoms have not recurred. The patient also noted \"a lump\" in the umbilical area. He saw primary care who felt that he may have a hernia. There was no associated discomfort in this area on examination. Scratch that    The patient's appetite is good. His weight is up since wrist surgery last year. He denies nausea, vomiting, dysphagia or odynophagia. He has heartburn \"once in a great while\". No other abdominal pain. Bowel movements are regular. He has noted no melena or hematochezia. The patient takes 81 mg of aspirin daily. He takes a dose of NSAIDs every few months.       History/Other:   Review of Systems  See above    Wt Readings from Last 7 Encounters:   12/04/23 241 lb (109.3 kg)   10/17/23 241 lb (109.3 kg)   03/23/23 243 lb (110.2 kg)   12/19/22 245 lb (111.1 kg)   09/15/22 245 lb (111.1 kg)   03/17/22 242 lb 14.4 oz (110.2 kg)   02/15/22 247 lb 4.8 oz (112.2 kg)       Current Outpatient Medications   Medication Sig Dispense Refill    Fenofibrate 160 MG Oral Tab Take 1 tablet (160 mg total) by mouth every evening. 90 tablet 3    mometasone 0.1 % External Ointment       Ammonium Lactate (LAC-HYDRIN) 12 % External Cream Apply 1 Application topically daily for 60 doses. 400 g 11    losartan 50 MG Oral Tab Take 1 tablet (50 mg total) by mouth daily. 90 tablet 1    Multiple Vitamin (MULTI-VITAMIN) Oral Tab multivitamin tablet, [RxNorm: 0]      Urea 40 % External Cream USE ONCE DAILY ON FEET AS DIRECTED      Tadalafil 20 MG Oral Tab Take 1 tablet (20 mg total) by mouth daily as needed for Erectile Dysfunction. 4 tablet 11    metFORMIN  MG Oral Tablet 24 Hr Take 2 tablets (1,000 mg total) by mouth daily with breakfast. 180 tablet 3    atorvastatin 20 MG Oral Tab Take 1 tablet (20 mg total) by mouth daily. 90 tablet 3    clobetasol 0.05 % External Ointment After using ketoconazole for 4 weeks start this ointment twice daily  Monday-Friday 30 g 2    ketoconazole 2 % External Cream Apply to affected area 2 times daily for 4 weeks. Use between toes as well. 60 g 3    Psyllium (METAMUCIL) 0.36 g Oral Cap       triamcinolone acetonide 0.1 % External Ointment       Fluocinonide 0.05 % External Ointment       ASPIRIN 81 OR Take 81 mg by mouth daily. Stopped 7x days prior to procedure 10/7      Multiple Vitamins-Minerals (MULTIVITAL) Oral Tab Take 1 tablet by mouth daily. Allergies:No Known Allergies    Objective:   Physical Exam  Vitals and nursing note reviewed. Constitutional:       General: He is not in acute distress. Appearance: He is well-developed. He is not ill-appearing, toxic-appearing or diaphoretic.    HENT:      Mouth/Throat:      Pharynx: No oropharyngeal exudate. Eyes:      General: No scleral icterus. Conjunctiva/sclera: Conjunctivae normal.   Neck:      Thyroid: No thyromegaly. Cardiovascular:      Rate and Rhythm: Normal rate and regular rhythm. Heart sounds: Normal heart sounds. No murmur heard. Pulmonary:      Effort: Pulmonary effort is normal. No respiratory distress. Breath sounds: Normal breath sounds. No wheezing or rales. Abdominal:      General: Bowel sounds are normal. There is no distension. Palpations: Abdomen is soft. There is no mass. Tenderness: There is no abdominal tenderness. There is no guarding or rebound. Comments: Definitive diastasis with suggestion of a coexistent ventral hernia immediately to the right and slightly above the umbilicus. Reducible nontender. Musculoskeletal:      Cervical back: Neck supple. Lymphadenopathy:      Cervical: No cervical adenopathy. Neurological:      Mental Status: He is alert and oriented to person, place, and time. Psychiatric:         Behavior: Behavior normal.         Assessment & Plan:   1. Periumbilical discomfort    2. Diastasis of rectus abdominis    3. Ventral hernia without obstruction or gangrene    The patient presents with transient burning periumbilical abdominal discomfort 1 year prior which has since resolved. The symptoms may have been due to acid mediated dyspepsia and/or gastroesophageal reflux. The patient has a previous history of erosive esophagitis, however, his last endoscopy was negative for erosive esophagitis despite taking on-demand t acid suppressive herapy at that time. Although PPI maintenance would be optimal the patient currently has minimal symptoms. I would cautiously observe with a low threshold for resuming PPI therapy if symptomatic. He has both diastasis and an uncomplicated umbilical hernia on physical examination. Operative intervention for the diastasis would not be recommended.   We discussed observation of the associated hernia or evaluation by general surgery. We have elected to observe and the patient will contact me if he notes discomfort or pain in this area so that surgical evaluation can be arranged. He will otherwise follow-up in the office as needed.       Meds This Visit:  Requested Prescriptions      No prescriptions requested or ordered in this encounter       Imaging & Referrals:  None

## 2023-12-04 NOTE — PATIENT INSTRUCTIONS
1.  Please contact me if the abdominal symptoms return. 2.  Please call me if the abdominal \"lump\" or hernia becomes painful or tender.

## 2023-12-11 ENCOUNTER — OFFICE VISIT (OUTPATIENT)
Dept: SURGERY | Facility: CLINIC | Age: 73
End: 2023-12-11

## 2023-12-11 DIAGNOSIS — N52.9 VASCULOGENIC ERECTILE DYSFUNCTION, UNSPECIFIED VASCULOGENIC ERECTILE DYSFUNCTION TYPE: Primary | ICD-10-CM

## 2023-12-11 PROCEDURE — 99214 OFFICE O/P EST MOD 30 MIN: CPT | Performed by: UROLOGY

## 2023-12-11 NOTE — PROGRESS NOTES
Marjory Severs is a 74year old male. HPI:     Chief Complaint   Patient presents with    Follow - Up    Abdominal Pain     P/t has no concerns       25-year-old male in follow-up to visit June 20, 2023 with a history of erectile dysfunction, chronically elevated serum PSA status post 5 previous prostate biopsies all negative most recently October 2020 for a PSA of 8.64, erectile dysfunction. PSA continues to be stable October 10, 2023 at 5.92. Digital prostate exam performed at last visit demonstrated BPH without suspicious findings. He has tried and failed both sildenafil and tadalafil most recently. He has chronic erectile dysfunction. He reports decreased libido and inability to lose weight recently. Requests that his testosterone levels be checked. Has questions and interest in intracavernosal injection therapy. Previous patient of Dr. Balta Kent. HISTORY:  Past Medical History:   Diagnosis Date    Allergic rhinitis     Per NG: desensitization: mold and dust mite    BPH (benign prostatic hyperplasia)     Diabetes ([facility] [address]     Esophageal reflux     Family history of malignant neoplasm of prostate 11/30/2009    Fx wrist     Per NG: Fx both wrists    Hemorrhoids     High blood pressure     High cholesterol     Other and unspecified hyperlipidemia     Peptic ulcer disease     Sleep apnea     USES CPAP    Stented coronary artery     Visual impairment     READERS      Past Surgical History:   Procedure Laterality Date    CATH BARE METAL STENT (BMS)      COLONOSCOPY      COLONOSCOPY      COLONOSCOPY N/A 4/11/2018    Procedure: COLONOSCOPY;  Surgeon: Beryle Byers, MD;  Location: [address] [place] [address] ENDOSCOPY    EGD      ELECTROCARDIOGRAM, COMPLETE  12/05/2013    scanned to media tab    OTHER SURGICAL HISTORY  2010    Per NG: UP3    OTHER SURGICAL HISTORY  2010    Per NG: T &A    OTHER SURGICAL HISTORY  2010    Per NG: septo; turb. red; smr of turbs.     TONSILLECTOMY      TOTAL KNEE REPLACEMENT Right 02/27/2020 UPPER GI ENDOSCOPY,BIOPSY        Family History   Problem Relation Age of Onset    Alcohol and Other Disorders Associated Father     Obesity Father     Heart Attack Father 50        Per NG: massive heart attack ( cause of death)    Cancer Mother 67        Per NG: leukemia ( cause of death)    Other (Other) Maternal Grandmother     Other (Other) Maternal Grandfather     Other (Other) Paternal Grandmother     Other (Other) Paternal Grandfather     Kidney Disease Brother         Per NG: Renal disease      Social History:   Social History     Socioeconomic History    Marital status:    Tobacco Use    Smoking status: Former     Packs/day: 1.00     Years: 20.00     Additional pack years: 0.00     Total pack years: 20.00     Types: Cigars, Cigarettes     Quit date:      Years since quittin.9     Passive exposure: Past    Smokeless tobacco: Never   Vaping Use    Vaping Use: Never used   Substance and Sexual Activity    Alcohol use: Not Currently     Alcohol/week: 1.0 standard drink of alcohol     Comment: Social drinker    Drug use: Never    Sexual activity: Not Currently   Other Topics Concern    Caffeine Concern Yes     Comment: Per NG: coffee, 3 cups    Exercise Yes     Comment: twice a week    Reaction to local anesthetic No    Pt has a pacemaker No    Pt has a defibrillator No        Medications (Active prior to today's visit):  Current Outpatient Medications   Medication Sig Dispense Refill    Fenofibrate 160 MG Oral Tab Take 1 tablet (160 mg total) by mouth every evening. 90 tablet 3    mometasone 0.1 % External Ointment       losartan 50 MG Oral Tab Take 1 tablet (50 mg total) by mouth daily. 90 tablet 1    Multiple Vitamin (MULTI-VITAMIN) Oral Tab multivitamin tablet, [RxNorm: 0]      Urea 40 % External Cream USE ONCE DAILY ON FEET AS DIRECTED      Tadalafil 20 MG Oral Tab Take 1 tablet (20 mg total) by mouth daily as needed for Erectile Dysfunction.  4 tablet 11    metFORMIN  MG Oral Tablet 24 Hr Take 2 tablets (1,000 mg total) by mouth daily with breakfast. 180 tablet 3    atorvastatin 20 MG Oral Tab Take 1 tablet (20 mg total) by mouth daily. 90 tablet 3    clobetasol 0.05 % External Ointment After using ketoconazole for 4 weeks start this ointment twice daily  Monday-Friday 30 g 2    ketoconazole 2 % External Cream Apply to affected area 2 times daily for 4 weeks. Use between toes as well. 60 g 3    Psyllium (METAMUCIL) 0.36 g Oral Cap       triamcinolone acetonide 0.1 % External Ointment       Fluocinonide 0.05 % External Ointment       ASPIRIN 81 OR Take 81 mg by mouth daily. Stopped 7x days prior to procedure 10/7      Multiple Vitamins-Minerals (MULTIVITAL) Oral Tab Take 1 tablet by mouth daily. Allergies:  No Known Allergies      ROS:       PHYSICAL EXAM:        ASSESSMENT/PLAN:   Assessment   Encounter Diagnosis   Name Primary? Vasculogenic erectile dysfunction, unspecified vasculogenic erectile dysfunction type Yes       Recommend:  - Informational material provided on intracavernosal injection therapy and I discussed this in detail with him today. - Order for free and total testosterone provided. He will notify us once test is done to check on results. - Otherwise tentatively follow-up in 6 months pending his decision to proceed with intracavernosal injection therapy. I spent a total of 25 this patient 1/2 time face-to-face question.          Orders This Visit:  Orders Placed This Encounter   Procedures    Testosterone,Free And Total       Meds This Visit:  Requested Prescriptions      No prescriptions requested or ordered in this encounter       Imaging & Referrals:  None     12/11/2023  Juan R Mayfield MD

## 2023-12-14 ENCOUNTER — OFFICE VISIT (OUTPATIENT)
Dept: DERMATOLOGY CLINIC | Facility: CLINIC | Age: 73
End: 2023-12-14

## 2023-12-14 DIAGNOSIS — Q82.8 KERATODERMA: Primary | ICD-10-CM

## 2023-12-14 PROCEDURE — 99213 OFFICE O/P EST LOW 20 MIN: CPT | Performed by: STUDENT IN AN ORGANIZED HEALTH CARE EDUCATION/TRAINING PROGRAM

## 2023-12-14 PROCEDURE — 1126F AMNT PAIN NOTED NONE PRSNT: CPT | Performed by: STUDENT IN AN ORGANIZED HEALTH CARE EDUCATION/TRAINING PROGRAM

## 2023-12-14 RX ORDER — AMMONIUM LACTATE 12 G/100G
LOTION TOPICAL
COMMUNITY
Start: 2023-10-11

## 2023-12-14 RX ORDER — TRETINOIN 1 MG/G
CREAM TOPICAL
Qty: 45 G | Refills: 2 | Status: SHIPPED | OUTPATIENT
Start: 2023-12-14

## 2023-12-14 NOTE — PROGRESS NOTES
December 14, 2023    Established patient      CHIEF COMPLAINT: Hyperkeratosis      HISTORY OF PRESENT ILLNESS: .     1. Hyperkeratosis f/u  Location: Bilateral feet              Duration: 1 year  Signs and symptoms: Itching, cracking of skin  Current treatment: clobetasol 0.05 % Ointment, Ammonium Lactate (LAC-HYDRIN) 12 % Cream and ketoconazole 2 % Cream   Past treatments: Ketoconazole cream     DERM HISTORY:  History of skin cancer: No  History of chronic skin disease/condition: No     FAMILY HISTORY:  History of melanoma: No  History of chronic skin disease/condition: No    History/Other:    REVIEW OF SYSTEMS:  Constitutional: Denies fever, chills, unintentional weight loss. Skin as per HPI    PAST MEDICAL HISTORY:  Past Medical History:   Diagnosis Date    Allergic rhinitis     Per NG: desensitization: mold and dust mite    BPH (benign prostatic hyperplasia)     Diabetes ([facility] [address]     Esophageal reflux     Family history of malignant neoplasm of prostate 11/30/2009    Fx wrist     Per NG: Fx both wrists    Hemorrhoids     High blood pressure     High cholesterol     Other and unspecified hyperlipidemia     Peptic ulcer disease     Sleep apnea     USES CPAP    Stented coronary artery     Visual impairment     READERS       Medications  Current Outpatient Medications   Medication Sig Dispense Refill    Fenofibrate 160 MG Oral Tab Take 1 tablet (160 mg total) by mouth every evening. 90 tablet 3    mometasone 0.1 % External Ointment       losartan 50 MG Oral Tab Take 1 tablet (50 mg total) by mouth daily. 90 tablet 1    Multiple Vitamin (MULTI-VITAMIN) Oral Tab multivitamin tablet, [RxNorm: 0]      Urea 40 % External Cream USE ONCE DAILY ON FEET AS DIRECTED      Tadalafil 20 MG Oral Tab Take 1 tablet (20 mg total) by mouth daily as needed for Erectile Dysfunction.  4 tablet 11    metFORMIN  MG Oral Tablet 24 Hr Take 2 tablets (1,000 mg total) by mouth daily with breakfast. 180 tablet 3    atorvastatin 20 MG Oral Tab Take 1 tablet (20 mg total) by mouth daily. 90 tablet 3    clobetasol 0.05 % External Ointment After using ketoconazole for 4 weeks start this ointment twice daily  Monday-Friday 30 g 2    ketoconazole 2 % External Cream Apply to affected area 2 times daily for 4 weeks. Use between toes as well. 60 g 3    Psyllium (METAMUCIL) 0.36 g Oral Cap       triamcinolone acetonide 0.1 % External Ointment       Fluocinonide 0.05 % External Ointment       ASPIRIN 81 OR Take 81 mg by mouth daily. Stopped 7x days prior to procedure 10/7      Multiple Vitamins-Minerals (MULTIVITAL) Oral Tab Take 1 tablet by mouth daily. Objective:    PHYSICAL EXAM:  General: awake, alert, no acute distress  Skin: Skin exam was performed today including the following: feet. Pertinent findings include:   - with keratoderma/hyperkeratosis    ASSESSMENT & PLAN:  Pathophysiology of diagnoses discussed with patient. Therapeutic options reviewed. Risks, benefits, and alternatives discussed with patient. Instructions reviewed at length.     #Keratoderma, feet  - Continue amonium lactate once marycruz   - Continue clobetasol 0.05% once daily  - Start tretinoin 0.1% to feet every nigh t  - Stop urea at this time      Return to clinic: 3 months or sooner if something concerning arises     Maria Del Carmen Frias MD

## 2024-01-03 ENCOUNTER — LAB ENCOUNTER (OUTPATIENT)
Dept: LAB | Age: 74
End: 2024-01-03
Attending: UROLOGY
Payer: MEDICARE

## 2024-01-03 DIAGNOSIS — N52.9 VASCULOGENIC ERECTILE DYSFUNCTION, UNSPECIFIED VASCULOGENIC ERECTILE DYSFUNCTION TYPE: ICD-10-CM

## 2024-01-03 PROCEDURE — 36415 COLL VENOUS BLD VENIPUNCTURE: CPT

## 2024-01-03 PROCEDURE — 84410 TESTOSTERONE BIOAVAILABLE: CPT

## 2024-01-05 LAB
SEX HORM BIND GLOB: 28.4 NMOL/L
TESTOST % FREE+WEAK BND: 22 %
TESTOST FREE+WEAK BND: 56.6 NG/DL
TESTOSTERONE TOT /MS: 257.1 NG/DL

## 2024-01-22 ENCOUNTER — TELEPHONE (OUTPATIENT)
Dept: SURGERY | Facility: CLINIC | Age: 74
End: 2024-01-22

## 2024-01-22 RX ORDER — TRIAMCINOLONE ACETONIDE 1 MG/G
OINTMENT TOPICAL
Qty: 454 G | Refills: 0 | Status: SHIPPED | OUTPATIENT
Start: 2024-01-22

## 2024-01-25 ENCOUNTER — TELEPHONE (OUTPATIENT)
Dept: DERMATOLOGY CLINIC | Facility: CLINIC | Age: 74
End: 2024-01-25

## 2024-01-29 ENCOUNTER — TELEPHONE (OUTPATIENT)
Dept: SURGERY | Facility: CLINIC | Age: 74
End: 2024-01-29

## 2024-01-29 NOTE — TELEPHONE ENCOUNTER
Noted.     Pamela Marie PA-C  You; Libertad Vasquez MD22 minutes ago (10:50 AM)     MCM sent to patient. Has history of high PSA. Would like for him to schedule a follow up to discuss tx options with Dr. Vasquez, risks and benefits of starting testosterone replacement therapy.  Pamela Petersen PA-C  You; Libertad Vasquez MD1 hour ago (9:56 AM)     Testosterone from 01/03/2024 was low, it was 257.1.  Dr. Vasquez, do you want to start him on testosterone? Do you like gel, injections? Let me know. Happy to order and send pt a message or call.

## 2024-02-07 ENCOUNTER — PATIENT MESSAGE (OUTPATIENT)
Dept: SURGERY | Facility: CLINIC | Age: 74
End: 2024-02-07

## 2024-02-26 ENCOUNTER — OFFICE VISIT (OUTPATIENT)
Dept: SURGERY | Facility: CLINIC | Age: 74
End: 2024-02-26

## 2024-02-26 ENCOUNTER — TELEPHONE (OUTPATIENT)
Dept: SURGERY | Facility: CLINIC | Age: 74
End: 2024-02-26

## 2024-02-26 DIAGNOSIS — R79.89 LOW TESTOSTERONE: ICD-10-CM

## 2024-02-26 DIAGNOSIS — R79.89 LOW TESTOSTERONE: Primary | ICD-10-CM

## 2024-02-26 DIAGNOSIS — N52.9 VASCULOGENIC ERECTILE DYSFUNCTION, UNSPECIFIED VASCULOGENIC ERECTILE DYSFUNCTION TYPE: ICD-10-CM

## 2024-02-26 PROCEDURE — 99214 OFFICE O/P EST MOD 30 MIN: CPT | Performed by: UROLOGY

## 2024-02-26 RX ORDER — TESTOSTERONE 20.25 MG/1.25G
40.5 GEL TOPICAL DAILY
Qty: 75 G | Refills: 3 | Status: SHIPPED | OUTPATIENT
Start: 2024-02-26 | End: 2024-02-26

## 2024-02-26 RX ORDER — TESTOSTERONE 20.25 MG/1.25G
40.5 GEL TOPICAL DAILY
Qty: 75 G | Refills: 3 | Status: SHIPPED | OUTPATIENT
Start: 2024-02-26

## 2024-02-26 NOTE — PROGRESS NOTES
Abhijeet Rosas is a 73 year old male.    HPI:     Chief Complaint   Patient presents with    elevated psa    Erectile Dysfuntion     Pt had testosterone levels done here to discuss        73-year-old male in follow-up to a previous visit December 11, 2023 for erectile dysfunction.  Serum testosterone was checked subsequent to his visit January 3, 2024 and was low at 257 ng/dL.  He continues to report low energy, difficulty being active to lose weight and low libido as well as erectile dysfunction.  Medications to help with erectile dysfunction has not been effective.      HISTORY:  Past Medical History:   Diagnosis Date    Allergic rhinitis     Per NG: desensitization: mold and dust mite    BPH (benign prostatic hyperplasia)     Diabetes ([facility])     Esophageal reflux     Family history of malignant neoplasm of prostate 11/30/2009    Fx wrist     Per NG: Fx both wrists    Hemorrhoids     High blood pressure     High cholesterol     Other and unspecified hyperlipidemia     Peptic ulcer disease     Sleep apnea     USES CPAP    Stented coronary artery     Visual impairment     READERS      Past Surgical History:   Procedure Laterality Date    CATH BARE METAL STENT (BMS)      COLONOSCOPY      COLONOSCOPY      COLONOSCOPY N/A 4/11/2018    Procedure: COLONOSCOPY;  Surgeon: Clarence De La Torre MD;  Location: [facility] ENDOSCOPY    EGD      ELECTROCARDIOGRAM, COMPLETE  12/05/2013    scanned to media tab    OTHER SURGICAL HISTORY  2010    Per NG: UP3    OTHER SURGICAL HISTORY  2010    Per NG: T &A    OTHER SURGICAL HISTORY  2010    Per NG: septo; turb. red; smr of turbs.    TONSILLECTOMY      TOTAL KNEE REPLACEMENT Right 02/27/2020    UPPER GI ENDOSCOPY,BIOPSY        Family History   Problem Relation Age of Onset    Alcohol and Other Disorders Associated Father     Obesity Father     Heart Attack Father 48        Per NG: massive heart attack ( cause of death)    Cancer Mother 72        Per NG: leukemia ( cause of death)    Other  (Other) Maternal Grandmother     Other (Other) Maternal Grandfather     Other (Other) Paternal Grandmother     Other (Other) Paternal Grandfather     Kidney Disease Brother         Per NG: Renal disease      Social History:   Social History     Socioeconomic History    Marital status:    Tobacco Use    Smoking status: Former     Packs/day: 1.00     Years: 20.00     Additional pack years: 0.00     Total pack years: 20.00     Types: Cigars, Cigarettes     Quit date:      Years since quittin.1     Passive exposure: Past    Smokeless tobacco: Never   Vaping Use    Vaping Use: Never used   Substance and Sexual Activity    Alcohol use: Not Currently     Alcohol/week: 1.0 standard drink of alcohol     Comment: Social drinker    Drug use: Never    Sexual activity: Not Currently   Other Topics Concern    Caffeine Concern Yes     Comment: Per NG: coffee, 3 cups    Exercise Yes     Comment: twice a week    Reaction to local anesthetic No    Pt has a pacemaker No    Pt has a defibrillator No        Medications (Active prior to today's visit):  Current Outpatient Medications   Medication Sig Dispense Refill    Tri-Mix Standard (PPP) Injection Solution 0.2 mL by Intracavernosal route as needed.     Inject intracavernosally as directed prior to intercourse     Tri-Mix Standard Recipe:  - Prostaglandin E1 5.88 ug/ml  - Papaverine HCI 18mg/ ml  - Phentolamine Mesylate 0.6 mg/ ml      Testosterone 1.62 % Transdermal Gel Place 40.5 mg onto the skin daily. 75 g 3    triamcinolone 0.1 % External Ointment Apply twice daily  to new itchy pink spots 454 g 0    ammonium lactate 12 % External Lotion APPLY 1 APPLICATION TOPICALLY DAILY FOR 60 DOSES.      Tretinoin 0.1 % External Cream Use thin layer once every night to feet 45 g 2    Fenofibrate 160 MG Oral Tab Take 1 tablet (160 mg total) by mouth every evening. 90 tablet 3    mometasone 0.1 % External Ointment       losartan 50 MG Oral Tab Take 1 tablet (50 mg total) by  mouth daily. 90 tablet 1    Multiple Vitamin (MULTI-VITAMIN) Oral Tab multivitamin tablet, [RxNorm: 0]      Urea 40 % External Cream USE ONCE DAILY ON FEET AS DIRECTED      Tadalafil 20 MG Oral Tab Take 1 tablet (20 mg total) by mouth daily as needed for Erectile Dysfunction. 4 tablet 11    metFORMIN  MG Oral Tablet 24 Hr Take 2 tablets (1,000 mg total) by mouth daily with breakfast. 180 tablet 3    atorvastatin 20 MG Oral Tab Take 1 tablet (20 mg total) by mouth daily. 90 tablet 3    clobetasol 0.05 % External Ointment After using ketoconazole for 4 weeks start this ointment twice daily  Monday-Friday 30 g 2    ketoconazole 2 % External Cream Apply to affected area 2 times daily for 4 weeks. Use between toes as well. 60 g 3    Psyllium (METAMUCIL) 0.36 g Oral Cap       triamcinolone acetonide 0.1 % External Ointment       Fluocinonide 0.05 % External Ointment       ASPIRIN 81 OR Take 81 mg by mouth daily. Stopped 7x days prior to procedure 10/7      Multiple Vitamins-Minerals (MULTIVITAL) Oral Tab Take 1 tablet by mouth daily.         Allergies:  No Known Allergies      ROS:       PHYSICAL EXAM:        ASSESSMENT/PLAN:   Assessment   Encounter Diagnoses   Name Primary?    Low testosterone Yes    Vasculogenic erectile dysfunction, unspecified vasculogenic erectile dysfunction type        Recommend:  - Low serum testosterone: Reviewed options for management.  We agreed to start the patient on AndroGel 1.62% 2 pumps daily.  Follow-up in 6 weeks with repeat PSA testosterone CMP and C BC prior to the visit.  Risks and side effects of testosterone supplementation were discussed.  I also advised the patient to discuss with his cardiologist given the fact he has coexisting heart disease to make sure it is safe from a heart standpoint and he will do so.  -Erectile dysfunction: Will trial the patient on Trimix.  Side effects reviewed.  Test dose will be provided.  He will return in 6 weeks for teaching.  I spent a  total of 25 minutes with patient more than half the time face-to-face discussion.       Orders This Visit:  No orders of the defined types were placed in this encounter.      Meds This Visit:  Requested Prescriptions     Signed Prescriptions Disp Refills    Testosterone 1.62 % Transdermal Gel 75 g 3     Sig: Place 40.5 mg onto the skin daily.       Imaging & Referrals:  None     2/26/2024  Libertad Vasquez MD

## 2024-02-26 NOTE — TELEPHONE ENCOUNTER
Pt was in today saw Dr. Vasquez, who ordered medication testosterone Gel to be printed, KIM tried several times to print script or even E-prescribe, having issues with this, so I told pt that we will try again tomorrow will pend med and send to [facility] to reorder.

## 2024-02-26 NOTE — TELEPHONE ENCOUNTER
Dr. ALVAREZ ordered Tri-mix test  dose through [facility] I informed pt that he will be contacted through the pharmacy after he agrees to pay for test dose it will be shipped to our office and we will then call pt with an appointment. Pt states he needs to follow up in 6 weeks so that can be his sosa for the teaching as well.order form signed and faxed received fax confirmation. Then order was sent to scanning.

## 2024-02-28 RX ORDER — TESTOSTERONE 20.25 MG/1.25G
40.5 GEL TOPICAL DAILY
Qty: 75 G | Refills: 3 | Status: SHIPPED | OUTPATIENT
Start: 2024-02-28 | End: 2024-02-28

## 2024-02-28 RX ORDER — TESTOSTERONE 20.25 MG/1.25G
40.5 GEL TOPICAL DAILY
Qty: 75 G | Refills: 3 | Status: SHIPPED | OUTPATIENT
Start: 2024-02-28 | End: 2024-03-01

## 2024-02-28 NOTE — TELEPHONE ENCOUNTER
Patients wife calling for update for script that she request to be sent to [facility]. I will try rn line if not please call at [phone],thanks.

## 2024-02-28 NOTE — TELEPHONE ENCOUNTER
I signed it again.  Nursing staff should verify that the prescription was appropriately sent.  If not, may need to call epic and find out what else we can do.

## 2024-02-28 NOTE — TELEPHONE ENCOUNTER
Per pharmacy directions need to be changed, states it is a pump medication, each pump delivers 1.25mg. Please advise thank you.

## 2024-02-29 NOTE — TELEPHONE ENCOUNTER
Hung Santiago, The pharmacy is calling stating that the Testosterone script has to have the correct application directions since it is a pumper bottle. ( Typically it should read apply 1 pump to each upper arm and shoulder area to deliver a total dose of of 40.5 mg daily, each actuation on the pumper delivers 20.25 mg). Can you please d/c this one and send a new script.

## 2024-02-29 NOTE — TELEPHONE ENCOUNTER
Spoke with Mi Pt's wife and explained that we will reach out and have a this taken care of. She was grateful

## 2024-02-29 NOTE — TELEPHONE ENCOUNTER
I s/w  K at the uro  and she informed that pt's spouse asked her to look into the testosterone script since the pharmacy is questioning the directions as it is a pumper bottle. I told  to tell pt's spouse that I will send a msg to [facility] as she is gone for the day to please send a new script.

## 2024-02-29 NOTE — TELEPHONE ENCOUNTER
From: Abhijeet Rosas  To: Libertad Vasquez  Sent: 2/7/2024 2:52 PM CST  Subject: Upcoming appointment    I have an appointment coming up for February 22 with Dr. Márquez. The past visit we discussed using injections to help with ED. He explained it all and now I’m wondering if one, you can tell me the cost of the prescription he’s recommending, which is going to be through the facility, and two if I do find this affordable, can dr. Márquez give me the instructions on February 22 office visit. Thank you very much ,  Abhijeet Rosas.

## 2024-03-01 RX ORDER — TESTOSTERONE 20.25 MG/1.25G
40.5 GEL TOPICAL DAILY
Qty: 75 G | Refills: 3 | Status: SHIPPED | OUTPATIENT
Start: 2024-03-01 | End: 2024-03-04

## 2024-03-04 RX ORDER — TESTOSTERONE 20.25 MG/1.25G
40.5 GEL TOPICAL DAILY
Qty: 75 G | Refills: 3 | Status: SHIPPED | OUTPATIENT
Start: 2024-03-04

## 2024-03-04 NOTE — TELEPHONE ENCOUNTER
Pt is at  the  asking for a   Written script for the Testosterone   Has been dealing with the wrong Pharmacy  and  cost

## 2024-03-11 ENCOUNTER — OFFICE VISIT (OUTPATIENT)
Dept: FAMILY MEDICINE CLINIC | Facility: CLINIC | Age: 74
End: 2024-03-11
Payer: MEDICARE

## 2024-03-11 VITALS
HEIGHT: 69 IN | BODY MASS INDEX: 36.14 KG/M2 | HEART RATE: 83 BPM | TEMPERATURE: 98 F | RESPIRATION RATE: 20 BRPM | SYSTOLIC BLOOD PRESSURE: 128 MMHG | OXYGEN SATURATION: 96 % | WEIGHT: 244 LBS | DIASTOLIC BLOOD PRESSURE: 84 MMHG

## 2024-03-11 DIAGNOSIS — B35.4 RINGWORM OF BODY: Primary | ICD-10-CM

## 2024-03-11 PROCEDURE — 99203 OFFICE O/P NEW LOW 30 MIN: CPT | Performed by: NURSE PRACTITIONER

## 2024-03-11 RX ORDER — CLOTRIMAZOLE 1 %
1 CREAM (GRAM) TOPICAL 2 TIMES DAILY
Qty: 1 EACH | Refills: 0 | Status: SHIPPED | OUTPATIENT
Start: 2024-03-11 | End: 2024-03-18

## 2024-03-11 NOTE — PROGRESS NOTES
CHIEF COMPLAINT:   No chief complaint on file.         HPI:    Abhijeet Rosas is a 73 year old male who presents for evaluation of a rash.  Per patient rash started in the past 2  weeks. Rash has been stable since onset.  Patient denies similar rash in the past. The rash is characterized by 2 circular areas with excoriated skin and central clearing.. The affected location(s) include bilateral lower extremities. Patient has treated rash with nothing.  Associated symptoms include: Itching initially. exposure: Unsure of exposure to new products, bug bites, or environmental irritants.  Patient does report that approximately 1 week ago he had a 2-day flulike illness with fever, chills, and bodyaches that resolved spontaneously.  He said during that time he took some Tylenol and that was the only medication during that time.  Patient also denies starting any new medication or treatment.  Patient reports that the areas appear to be resolving but that his wife urged him to be seen because they are leaving town in a couple days.    Current Outpatient Medications   Medication Sig Dispense Refill    clotrimazole 1 % External Cream Apply 1 Application topically 2 (two) times daily for 7 days. 1 each 0    Testosterone 1.62 % Transdermal Gel Place 40.5 mg onto the skin daily. apply 1 pump to each upper arm and shoulder area to deliver a total dose of of 40.5 mg daily, each actuation on the pumper delivers 20.25 mg). 75 g 3    Tri-Mix Standard (PPP) Injection Solution 0.2 mL by Intracavernosal route as needed.     Inject intracavernosally as directed prior to intercourse     Tri-Mix Standard Recipe:  - Prostaglandin E1 5.88 ug/ml  - Papaverine HCI 18mg/ ml  - Phentolamine Mesylate 0.6 mg/ ml      triamcinolone 0.1 % External Ointment Apply twice daily  to new itchy pink spots 454 g 0    ammonium lactate 12 % External Lotion APPLY 1 APPLICATION TOPICALLY DAILY FOR 60 DOSES.      Tretinoin 0.1 % External Cream Use thin layer once  every night to feet 45 g 2    Fenofibrate 160 MG Oral Tab Take 1 tablet (160 mg total) by mouth every evening. 90 tablet 3    mometasone 0.1 % External Ointment       losartan 50 MG Oral Tab Take 1 tablet (50 mg total) by mouth daily. 90 tablet 1    Multiple Vitamin (MULTI-VITAMIN) Oral Tab multivitamin tablet, [RxNorm: 0]      Urea 40 % External Cream USE ONCE DAILY ON FEET AS DIRECTED      Tadalafil 20 MG Oral Tab Take 1 tablet (20 mg total) by mouth daily as needed for Erectile Dysfunction. 4 tablet 11    metFORMIN  MG Oral Tablet 24 Hr Take 2 tablets (1,000 mg total) by mouth daily with breakfast. 180 tablet 3    atorvastatin 20 MG Oral Tab Take 1 tablet (20 mg total) by mouth daily. 90 tablet 3    clobetasol 0.05 % External Ointment After using ketoconazole for 4 weeks start this ointment twice daily  Monday-Friday 30 g 2    ketoconazole 2 % External Cream Apply to affected area 2 times daily for 4 weeks. Use between toes as well. 60 g 3    Psyllium (METAMUCIL) 0.36 g Oral Cap       triamcinolone acetonide 0.1 % External Ointment       Fluocinonide 0.05 % External Ointment       ASPIRIN 81 OR Take 81 mg by mouth daily. Stopped 7x days prior to procedure 10/7      Multiple Vitamins-Minerals (MULTIVITAL) Oral Tab Take 1 tablet by mouth daily.        Past Medical History:   Diagnosis Date    Allergic rhinitis     Per NG: desensitization: mold and dust mite    BPH (benign prostatic hyperplasia)     Diabetes (HCC)     Esophageal reflux     Family history of malignant neoplasm of prostate 11/30/2009    Fx wrist     Per NG: Fx both wrists    Hemorrhoids     High blood pressure     High cholesterol     Other and unspecified hyperlipidemia     Peptic ulcer disease     Sleep apnea     USES CPAP    Stented coronary artery     Visual impairment     READERS      Past Surgical History:   Procedure Laterality Date    CATH BARE METAL STENT (BMS)      COLONOSCOPY      COLONOSCOPY      COLONOSCOPY N/A 4/11/2018     Procedure: COLONOSCOPY;  Surgeon: Clarence De La Torre MD;  Location: [facility] ENDOSCOPY    EGD      ELECTROCARDIOGRAM, COMPLETE  2013    scanned to media tab    OTHER SURGICAL HISTORY  2010    Per NG: UP3    OTHER SURGICAL HISTORY  2010    Per NG: T &A    OTHER SURGICAL HISTORY  2010    Per NG: septo; turb. red; smr of turbs.    TONSILLECTOMY      TOTAL KNEE REPLACEMENT Right 2020    UPPER GI ENDOSCOPY,BIOPSY        Family History   Problem Relation Age of Onset    Alcohol and Other Disorders Associated Father     Obesity Father     Heart Attack Father 48        Per NG: massive heart attack ( cause of death)    Cancer Mother 72        Per NG: leukemia ( cause of death)    Other (Other) Maternal Grandmother     Other (Other) Maternal Grandfather     Other (Other) Paternal Grandmother     Other (Other) Paternal Grandfather     Kidney Disease Brother         Per NG: Renal disease      Social History     Socioeconomic History    Marital status:    Tobacco Use    Smoking status: Former     Packs/day: 1.00     Years: 20.00     Additional pack years: 0.00     Total pack years: 20.00     Types: Cigars, Cigarettes     Quit date:      Years since quittin.2     Passive exposure: Past    Smokeless tobacco: Never   Vaping Use    Vaping Use: Never used   Substance and Sexual Activity    Alcohol use: Not Currently     Alcohol/week: 1.0 standard drink of alcohol     Comment: Social drinker    Drug use: Never    Sexual activity: Not Currently   Other Topics Concern    Caffeine Concern Yes     Comment: Per NG: coffee, 3 cups    Exercise Yes     Comment: twice a week    Reaction to local anesthetic No    Pt has a pacemaker No    Pt has a defibrillator No         REVIEW OF SYSTEMS:   GENERAL: feels well otherwise  SKIN: Per HPI.   HEENT: Denies rhinorrhea, edema of the lips or swelling of throat.  CARDIOVASCULAR: Denies chest pains or palpitations.  LUNGS: Denies shortness of breath with exertion or rest. No  cough or wheezing.  LYMPH: Denies enlargement of the lymph nodes.        EXAM:   /84   Pulse 83   Temp 98 °F (36.7 °C)   Resp 20   Ht 5' 9\" (1.753 m)   Wt 244 lb (110.7 kg)   SpO2 96%   BMI 36.03 kg/m²   GENERAL: well developed, well nourished,in no apparent distress  SKIN: See images below  EYES: PERRLA, EOMI, conjunctiva are clear  HENT: Head atraumatic, normocephalic. TM's WNL bilaterally. Normal external nose. Nasal mucosa pink without edema.  Oropharynx moist without lesions. No erythema of the throat.  LUNGS: Clear to auscultation bilaterally.  No wheezing, rhonchi, or rales.  No diminished breath sounds. No increased work of breathing.   CARDIO: RRR  without murmur  LYMPH: No no lymphadenopathy.               ASSESSMENT AND PLAN:   Abhijeet Rosas is a 73 year old male who presents for evaluation of a rash. Findings are consistent with:    ASSESSMENT:  Encounter Diagnosis   Name Primary?    Ringworm of body Yes       PLAN: Discussed that areas of concern appear to be consistent with tinea or ringworm.  Will treat with clotrimazole lotion.  Patient reports that he has a dermatology appointment in the following weeks.  Discussed that if lotion exacerbates the area, discontinue lotion and continue to keep area clean and dry.  Discussed possibly taking a daily nondrowsy antihistamine like Zyrtec if area is itching.  Encourage patient to try not to scratch itching area which can lead to infection.  Meds as listed below.  Comfort measures as described in Patient Instructions.  Skin care discussed with patient.     Meds & Refills for this Visit:  Requested Prescriptions     Signed Prescriptions Disp Refills    clotrimazole 1 % External Cream 1 each 0     Sig: Apply 1 Application topically 2 (two) times daily for 7 days.       Risk and benefits of medication discussed.     The patient indicates understanding of these issues and agrees to the plan.  The patient is asked to see PCP in 3 days if sx's are not  improving or if they worsen.

## 2024-03-19 ENCOUNTER — OFFICE VISIT (OUTPATIENT)
Dept: DERMATOLOGY CLINIC | Facility: CLINIC | Age: 74
End: 2024-03-19

## 2024-03-19 DIAGNOSIS — Z51.81 MEDICATION MONITORING ENCOUNTER: Primary | ICD-10-CM

## 2024-03-19 PROCEDURE — 99214 OFFICE O/P EST MOD 30 MIN: CPT | Performed by: STUDENT IN AN ORGANIZED HEALTH CARE EDUCATION/TRAINING PROGRAM

## 2024-03-19 NOTE — PROGRESS NOTES
March 19, 2024    Established patient     CHIEF COMPLAINT: Hyperkeratosis f/u     HISTORY OF PRESENT ILLNESS: .    1. Keratoderma/Hyperkeratosis  Location: B/L feet   Duration: 1 year  Signs and symptoms: Slight improvement/stable  Current treatment: Mometasone, Ammonium lactate and tretinoin      DERM HISTORY:  History of skin cancer: No  History of chronic skin disease/condition: No    FAMILY HISTORY:  History of melanoma: No  History of chronic skin disease/condition: No    History/Other:    REVIEW OF SYSTEMS:  Constitutional: Denies fever, chills, unintentional weight loss.   Skin as per HPI    PAST MEDICAL HISTORY:  Past Medical History:   Diagnosis Date    Allergic rhinitis     Per NG: desensitization: mold and dust mite    BPH (benign prostatic hyperplasia)     Diabetes (HCC)     Esophageal reflux     Family history of malignant neoplasm of prostate 11/30/2009    Fx wrist     Per NG: Fx both wrists    Hemorrhoids     High blood pressure     High cholesterol     Other and unspecified hyperlipidemia     Peptic ulcer disease     Sleep apnea     USES CPAP    Stented coronary artery     Visual impairment     READERS       Medications  Current Outpatient Medications   Medication Sig Dispense Refill    Testosterone 1.62 % Transdermal Gel Place 40.5 mg onto the skin daily. apply 1 pump to each upper arm and shoulder area to deliver a total dose of of 40.5 mg daily, each actuation on the pumper delivers 20.25 mg). 75 g 3    Tri-Mix Standard (PPP) Injection Solution 0.2 mL by Intracavernosal route as needed.     Inject intracavernosally as directed prior to intercourse     Tri-Mix Standard Recipe:  - Prostaglandin E1 5.88 ug/ml  - Papaverine HCI 18mg/ ml  - Phentolamine Mesylate 0.6 mg/ ml      triamcinolone 0.1 % External Ointment Apply twice daily  to new itchy pink spots 454 g 0    ammonium lactate 12 % External Lotion APPLY 1 APPLICATION TOPICALLY DAILY FOR 60 DOSES.      Tretinoin 0.1 % External Cream Use thin  layer once every night to feet 45 g 2    Fenofibrate 160 MG Oral Tab Take 1 tablet (160 mg total) by mouth every evening. 90 tablet 3    mometasone 0.1 % External Ointment       losartan 50 MG Oral Tab Take 1 tablet (50 mg total) by mouth daily. 90 tablet 1    Multiple Vitamin (MULTI-VITAMIN) Oral Tab multivitamin tablet, [RxNorm: 0]      Urea 40 % External Cream USE ONCE DAILY ON FEET AS DIRECTED      Tadalafil 20 MG Oral Tab Take 1 tablet (20 mg total) by mouth daily as needed for Erectile Dysfunction. 4 tablet 11    metFORMIN  MG Oral Tablet 24 Hr Take 2 tablets (1,000 mg total) by mouth daily with breakfast. 180 tablet 3    atorvastatin 20 MG Oral Tab Take 1 tablet (20 mg total) by mouth daily. 90 tablet 3    clobetasol 0.05 % External Ointment After using ketoconazole for 4 weeks start this ointment twice daily  Monday-Friday 30 g 2    ketoconazole 2 % External Cream Apply to affected area 2 times daily for 4 weeks. Use between toes as well. 60 g 3    Psyllium (METAMUCIL) 0.36 g Oral Cap       triamcinolone acetonide 0.1 % External Ointment       Fluocinonide 0.05 % External Ointment       ASPIRIN 81 OR Take 81 mg by mouth daily. Stopped 7x days prior to procedure 10/7      Multiple Vitamins-Minerals (MULTIVITAL) Oral Tab Take 1 tablet by mouth daily.         Objective:    PHYSICAL EXAM:  General: awake, alert, no acute distress  Skin: Skin exam was performed today including the following: feet. Pertinent findings include:   - with hyperkeratotic plaques with erosion    ASSESSMENT & PLAN:  Pathophysiology of diagnoses discussed with patient.  Therapeutic options reviewed. Risks, benefits, and alternatives discussed with patient. Instructions reviewed at length.    #Keratoderma, feet  - Continue amonium lactate once marycruz   - Continue clobetasol 0.05% once daily  - tretinoin 0.1% to feet every nigh t  - Plan for Methotrexate 12.5mg (5 pills) taken once per WEEK. Take folic acid 1mg daily. Paper script  given  - Reviewed risks of therapy birth defects, sensitivity to the sun, radiation recall, bone marrow suppression (low blood counts, anemia), immunosuppression (may get more infections and increased risk for cancers including skin cancer and lymphoma), hair loss, stomach upset including diarrhea, fatigue, oral ulcers, liver damage, lung damage, advised that MUST avoid alcohol on the medication. Patient understands to call us if develops side effects including fevers, chills, bleeding/bruising, cough, shortness of breath, abdominal pain, vomiting, or anorexia.    High Risk Medication Monitoring:   - Baseline CBC, CMP  - Will repeat CBC and CMP monthly, then every 3 months   - Please refrain from alcohol intake while taking methotrexate.   - Instructed to avoid NSAIDs (ibuprofen), sulfa medications and aspirin use should be minimized as these can cause increased blood levels of methotrexate      Return to clinic: 1 month or sooner if something concerning arises     Rafat French MD

## 2024-04-01 ENCOUNTER — TELEPHONE (OUTPATIENT)
Dept: SURGERY | Facility: CLINIC | Age: 74
End: 2024-04-01

## 2024-04-01 ENCOUNTER — LAB ENCOUNTER (OUTPATIENT)
Dept: LAB | Facility: HOSPITAL | Age: 74
End: 2024-04-01
Attending: UROLOGY
Payer: MEDICARE

## 2024-04-01 DIAGNOSIS — R79.89 LOW TESTOSTERONE: ICD-10-CM

## 2024-04-01 DIAGNOSIS — R97.20 ELEVATED PSA: ICD-10-CM

## 2024-04-01 DIAGNOSIS — R97.20 ELEVATED PSA: Primary | ICD-10-CM

## 2024-04-01 DIAGNOSIS — Z51.81 MEDICATION MONITORING ENCOUNTER: ICD-10-CM

## 2024-04-01 DIAGNOSIS — R79.89 LOW TESTOSTERONE: Primary | ICD-10-CM

## 2024-04-01 LAB
ALBUMIN SERPL-MCNC: 4.7 G/DL (ref 3.2–4.8)
ALBUMIN/GLOB SERPL: 1.8 {RATIO} (ref 1–2)
ALP LIVER SERPL-CCNC: 40 U/L
ALT SERPL-CCNC: 32 U/L
ANION GAP SERPL CALC-SCNC: 9 MMOL/L (ref 0–18)
AST SERPL-CCNC: 19 U/L (ref ?–34)
BASOPHILS # BLD AUTO: 0.03 X10(3) UL (ref 0–0.2)
BASOPHILS NFR BLD AUTO: 0.5 %
BILIRUB SERPL-MCNC: 0.5 MG/DL (ref 0.2–1.1)
BUN BLD-MCNC: 19 MG/DL (ref 9–23)
BUN/CREAT SERPL: 17.8 (ref 10–20)
CALCIUM BLD-MCNC: 9.8 MG/DL (ref 8.7–10.4)
CHLORIDE SERPL-SCNC: 112 MMOL/L (ref 98–112)
CO2 SERPL-SCNC: 24 MMOL/L (ref 21–32)
CREAT BLD-MCNC: 1.07 MG/DL
DEPRECATED RDW RBC AUTO: 46 FL (ref 35.1–46.3)
EGFRCR SERPLBLD CKD-EPI 2021: 72 ML/MIN/1.73M2 (ref 60–?)
EOSINOPHIL # BLD AUTO: 0.17 X10(3) UL (ref 0–0.7)
EOSINOPHIL NFR BLD AUTO: 2.8 %
ERYTHROCYTE [DISTWIDTH] IN BLOOD BY AUTOMATED COUNT: 14.3 % (ref 11–15)
FASTING STATUS PATIENT QL REPORTED: YES
GLOBULIN PLAS-MCNC: 2.6 G/DL (ref 2.8–4.4)
GLUCOSE BLD-MCNC: 188 MG/DL (ref 70–99)
HAV AB SER QL IA: REACTIVE
HAV IGM SER QL: NONREACTIVE
HBV CORE AB SERPL QL IA: NONREACTIVE
HBV SURFACE AB SER QL: NONREACTIVE
HBV SURFACE AB SERPL IA-ACNC: 7.74 MIU/ML
HBV SURFACE AG SERPL QL IA: NONREACTIVE
HCT VFR BLD AUTO: 43.1 %
HCV AB SERPL QL IA: NONREACTIVE
HGB BLD-MCNC: 14 G/DL
IMM GRANULOCYTES # BLD AUTO: 0.03 X10(3) UL (ref 0–1)
IMM GRANULOCYTES NFR BLD: 0.5 %
LYMPHOCYTES # BLD AUTO: 2.24 X10(3) UL (ref 1–4)
LYMPHOCYTES NFR BLD AUTO: 36.7 %
MCH RBC QN AUTO: 28.9 PG (ref 26–34)
MCHC RBC AUTO-ENTMCNC: 32.5 G/DL (ref 31–37)
MCV RBC AUTO: 88.9 FL
MONOCYTES # BLD AUTO: 0.47 X10(3) UL (ref 0.1–1)
MONOCYTES NFR BLD AUTO: 7.7 %
NEUTROPHILS # BLD AUTO: 3.16 X10 (3) UL (ref 1.5–7.7)
NEUTROPHILS # BLD AUTO: 3.16 X10(3) UL (ref 1.5–7.7)
NEUTROPHILS NFR BLD AUTO: 51.8 %
OSMOLALITY SERPL CALC.SUM OF ELEC: 307 MOSM/KG (ref 275–295)
PLATELET # BLD AUTO: 157 10(3)UL (ref 150–450)
POTASSIUM SERPL-SCNC: 4.5 MMOL/L (ref 3.5–5.1)
PROT SERPL-MCNC: 7.3 G/DL (ref 5.7–8.2)
PSA SERPL-MCNC: 4.77 NG/ML (ref ?–4)
RBC # BLD AUTO: 4.85 X10(6)UL
SODIUM SERPL-SCNC: 145 MMOL/L (ref 136–145)
TESTOST SERPL-MCNC: 367.9 NG/DL
WBC # BLD AUTO: 6.1 X10(3) UL (ref 4–11)

## 2024-04-01 PROCEDURE — 86803 HEPATITIS C AB TEST: CPT

## 2024-04-01 PROCEDURE — 84153 ASSAY OF PSA TOTAL: CPT

## 2024-04-01 PROCEDURE — 86706 HEP B SURFACE ANTIBODY: CPT

## 2024-04-01 PROCEDURE — 85025 COMPLETE CBC W/AUTO DIFF WBC: CPT

## 2024-04-01 PROCEDURE — 80053 COMPREHEN METABOLIC PANEL: CPT

## 2024-04-01 PROCEDURE — 36415 COLL VENOUS BLD VENIPUNCTURE: CPT

## 2024-04-01 PROCEDURE — 86704 HEP B CORE ANTIBODY TOTAL: CPT

## 2024-04-01 PROCEDURE — 86708 HEPATITIS A ANTIBODY: CPT

## 2024-04-01 PROCEDURE — 80503 PATH CLIN CONSLTJ SF 5-20: CPT

## 2024-04-01 PROCEDURE — 84403 ASSAY OF TOTAL TESTOSTERONE: CPT

## 2024-04-01 PROCEDURE — 87340 HEPATITIS B SURFACE AG IA: CPT

## 2024-04-01 PROCEDURE — 86480 TB TEST CELL IMMUN MEASURE: CPT

## 2024-04-01 PROCEDURE — 86709 HEPATITIS A IGM ANTIBODY: CPT

## 2024-04-01 NOTE — TELEPHONE ENCOUNTER
Per pt he is at the lab now and has already fasted and asking for a call when the order is in. Please advise

## 2024-04-01 NOTE — TELEPHONE ENCOUNTER
Called patient, verified name and . I let patient know labs were ordered. Pt says he is in the lab now.   Patient agreed, verbalized understandings and has no further questions.

## 2024-04-03 LAB
M TB IFN-G CD4+ T-CELLS BLD-ACNC: 0.04 IU/ML
M TB TUBERC IFN-G BLD QL: NEGATIVE
M TB TUBERC IGNF/MITOGEN IGNF CONTROL: >10 IU/ML
QFT TB1 AG MINUS NIL: 0.01 IU/ML
QFT TB2 AG MINUS NIL: 0.02 IU/ML

## 2024-04-04 NOTE — PROGRESS NOTES
Plan was to start methotrexate, per LOV \"paper RX given\"? Please confirm I don't see printed trail for this

## 2024-04-08 ENCOUNTER — OFFICE VISIT (OUTPATIENT)
Dept: SURGERY | Facility: CLINIC | Age: 74
End: 2024-04-08

## 2024-04-08 DIAGNOSIS — N52.9 VASCULOGENIC ERECTILE DYSFUNCTION, UNSPECIFIED VASCULOGENIC ERECTILE DYSFUNCTION TYPE: ICD-10-CM

## 2024-04-08 DIAGNOSIS — R79.89 LOW TESTOSTERONE: Primary | ICD-10-CM

## 2024-04-08 PROCEDURE — 99213 OFFICE O/P EST LOW 20 MIN: CPT | Performed by: UROLOGY

## 2024-04-08 RX ORDER — AMMONIUM LACTATE 12 G/100G
LOTION TOPICAL
Qty: 400 G | Refills: 0 | Status: SHIPPED | OUTPATIENT
Start: 2024-04-08

## 2024-04-08 NOTE — PROGRESS NOTES
Abhijeet Rosas is a 74 year old male.    HPI:     Chief Complaint   Patient presents with    Other     Low Testosterone results     74-year-old male in follow-up to a previous visit February 26, 2024 for erectile dysfunction and low serum testosterone.  Started at last visit on AndroGel 1.62%.  Follow-up testosterone levels performed April 1, 2024 demonstrates a mostly unremarkable CMP, CBC and elevated serum testosterone level now at 367.9 in normal range compared to a previously low serum testosterone of 257 ng/dL.  He has a chronically elevated serum PSA for which he had 5 previous negative prostate biopsies.  His PSA remains stable at 4.77 April 1, 2024.        HISTORY:  Past Medical History:   Diagnosis Date    Allergic rhinitis     Per NG: desensitization: mold and dust mite    BPH (benign prostatic hyperplasia)     Diabetes (HCC)     Esophageal reflux     Family history of malignant neoplasm of prostate 11/30/2009    Fx wrist     Per NG: Fx both wrists    Hemorrhoids     High blood pressure     High cholesterol     Other and unspecified hyperlipidemia     Peptic ulcer disease     Sleep apnea     USES CPAP    Stented coronary artery     Visual impairment     READERS      Past Surgical History:   Procedure Laterality Date    CATH BARE METAL STENT (BMS)      COLONOSCOPY      COLONOSCOPY      COLONOSCOPY N/A 4/11/2018    Procedure: COLONOSCOPY;  Surgeon: Clarence De La Torre MD;  Location: [facility] ENDOSCOPY    EGD      ELECTROCARDIOGRAM, COMPLETE  12/05/2013    scanned to media tab    OTHER SURGICAL HISTORY  2010    Per NG: UP3    OTHER SURGICAL HISTORY  2010    Per NG: T &A    OTHER SURGICAL HISTORY  2010    Per NG: septo; turb. red; smr of turbs.    TONSILLECTOMY      TOTAL KNEE REPLACEMENT Right 02/27/2020    UPPER GI ENDOSCOPY,BIOPSY        Family History   Problem Relation Age of Onset    Alcohol and Other Disorders Associated Father     Obesity Father     Heart Attack Father 48        Per NG: massive heart  attack ( cause of death)    Cancer Mother 72        Per NG: leukemia ( cause of death)    Other (Other) Maternal Grandmother     Other (Other) Maternal Grandfather     Other (Other) Paternal Grandmother     Other (Other) Paternal Grandfather     Kidney Disease Brother         Per NG: Renal disease      Social History:   Social History     Socioeconomic History    Marital status:    Tobacco Use    Smoking status: Former     Packs/day: 1.00     Years: 20.00     Additional pack years: 0.00     Total pack years: 20.00     Types: Cigars, Cigarettes     Quit date:      Years since quittin.2     Passive exposure: Past    Smokeless tobacco: Never   Vaping Use    Vaping Use: Never used   Substance and Sexual Activity    Alcohol use: Not Currently     Alcohol/week: 1.0 standard drink of alcohol     Comment: Social drinker    Drug use: Never    Sexual activity: Not Currently   Other Topics Concern    Caffeine Concern Yes     Comment: Per NG: coffee, 3 cups    Exercise Yes     Comment: twice a week    Reaction to local anesthetic No    Pt has a pacemaker No    Pt has a defibrillator No        Medications (Active prior to today's visit):  Current Outpatient Medications   Medication Sig Dispense Refill    ammonium lactate 12 % External Lotion Apply once  daily to affected areas 400 g 0    Testosterone 1.62 % Transdermal Gel Place 40.5 mg onto the skin daily. apply 1 pump to each upper arm and shoulder area to deliver a total dose of of 40.5 mg daily, each actuation on the pumper delivers 20.25 mg). 75 g 3    Tri-Mix Standard (PPP) Injection Solution 0.2 mL by Intracavernosal route as needed.     Inject intracavernosally as directed prior to intercourse     Tri-Mix Standard Recipe:  - Prostaglandin E1 5.88 ug/ml  - Papaverine HCI 18mg/ ml  - Phentolamine Mesylate 0.6 mg/ ml      triamcinolone 0.1 % External Ointment Apply twice daily  to new itchy pink spots 454 g 0    Tretinoin 0.1 % External Cream Use thin layer  once every night to feet 45 g 2    Fenofibrate 160 MG Oral Tab Take 1 tablet (160 mg total) by mouth every evening. 90 tablet 3    mometasone 0.1 % External Ointment       losartan 50 MG Oral Tab Take 1 tablet (50 mg total) by mouth daily. 90 tablet 1    Multiple Vitamin (MULTI-VITAMIN) Oral Tab multivitamin tablet, [RxNorm: 0]      Urea 40 % External Cream USE ONCE DAILY ON FEET AS DIRECTED      Tadalafil 20 MG Oral Tab Take 1 tablet (20 mg total) by mouth daily as needed for Erectile Dysfunction. 4 tablet 11    metFORMIN  MG Oral Tablet 24 Hr Take 2 tablets (1,000 mg total) by mouth daily with breakfast. 180 tablet 3    atorvastatin 20 MG Oral Tab Take 1 tablet (20 mg total) by mouth daily. 90 tablet 3    clobetasol 0.05 % External Ointment After using ketoconazole for 4 weeks start this ointment twice daily  Monday-Friday 30 g 2    ketoconazole 2 % External Cream Apply to affected area 2 times daily for 4 weeks. Use between toes as well. 60 g 3    Psyllium (METAMUCIL) 0.36 g Oral Cap       triamcinolone acetonide 0.1 % External Ointment       Fluocinonide 0.05 % External Ointment       ASPIRIN 81 OR Take 81 mg by mouth daily. Stopped 7x days prior to procedure 10/7      Multiple Vitamins-Minerals (MULTIVITAL) Oral Tab Take 1 tablet by mouth daily.         Allergies:  No Known Allergies      ROS:       PHYSICAL EXAM:        ASSESSMENT/PLAN:   Assessment   Encounter Diagnoses   Name Primary?    Low testosterone Yes    Vasculogenic erectile dysfunction, unspecified vasculogenic erectile dysfunction type        Reviewed findings at length with patient.  Discussed options for management.  He will continue on AndroGel 1.62%.  I asked him to follow-up in 3 to 4 months.  Repeat CMP, CBC, PSA and testosterone level September or October 2024.         Orders This Visit:  No orders of the defined types were placed in this encounter.      Meds This Visit:  Requested Prescriptions      No prescriptions requested or  ordered in this encounter       Imaging & Referrals:  None     4/8/2024  Libertad Vasquez MD

## 2024-04-08 NOTE — TELEPHONE ENCOUNTER
Refill Request for medication(s): AMMONIUM LACTATE 12 % External Lotion     Last Office Visit: 03/19/24    Last Refill: 10/11/23    Pharmacy, Dosage verified: [id number] - [place] [id number] [facility] [phone], [phone]     Rx pended and sent to provider for approval, please advise. Thank You!  ,

## 2024-04-09 ENCOUNTER — TELEPHONE (OUTPATIENT)
Dept: SURGERY | Facility: CLINIC | Age: 74
End: 2024-04-09

## 2024-04-09 NOTE — TELEPHONE ENCOUNTER
Received TRI-Mix test dose placed in medication fridge, called pt LM for pt to call the office so I can schedule him with Dr. ALVAREZ for teaching. On 2/26/24 plan below.     -Erectile dysfunction: Will trial the patient on Trimix. Side effects reviewed. Test dose will be provided. He will return in 6 weeks for teaching.

## 2024-04-10 NOTE — TELEPHONE ENCOUNTER
Spoke with patient, assisted in scheduling injection teaching appointment. PT confirmed and verbalized understanding.     Future Appointments   Date Time Provider Department Center   4/15/2024 11:40 AM Libertad Vasquez MD [facility]   4/15/2024  1:00 PM Rafat French MD [facility] [place]   6/17/2024  4:40 PM [facility]   10/2/2024  2:40 PM Aaron Gold MD [facility]

## 2024-04-15 ENCOUNTER — OFFICE VISIT (OUTPATIENT)
Dept: SURGERY | Facility: CLINIC | Age: 74
End: 2024-04-15
Payer: MEDICARE

## 2024-04-15 ENCOUNTER — OFFICE VISIT (OUTPATIENT)
Dept: DERMATOLOGY CLINIC | Facility: CLINIC | Age: 74
End: 2024-04-15

## 2024-04-15 DIAGNOSIS — N52.9 VASCULOGENIC ERECTILE DYSFUNCTION, UNSPECIFIED VASCULOGENIC ERECTILE DYSFUNCTION TYPE: Primary | ICD-10-CM

## 2024-04-15 DIAGNOSIS — R21 RASH AND OTHER NONSPECIFIC SKIN ERUPTION: Primary | ICD-10-CM

## 2024-04-15 DIAGNOSIS — R79.89 LOW TESTOSTERONE: ICD-10-CM

## 2024-04-15 PROCEDURE — 88305 TISSUE EXAM BY PATHOLOGIST: CPT | Performed by: STUDENT IN AN ORGANIZED HEALTH CARE EDUCATION/TRAINING PROGRAM

## 2024-04-15 PROCEDURE — 88312 SPECIAL STAINS GROUP 1: CPT | Performed by: STUDENT IN AN ORGANIZED HEALTH CARE EDUCATION/TRAINING PROGRAM

## 2024-04-15 NOTE — PROGRESS NOTES
Established Patient    Referred by: No referring provider defined for this encounter.    CHIEF COMPLAINT: Lesion of concern     HISTORY OF PRESENT ILLNESS: Abhijeet Rosas is a 74 year old male here for evaluation of lesion of concern.    1. Keratoderma F/U  Location: B/Lfeet  Duration: over 1 yr  Signs and symptoms: Foot is clearing up again.   Current treatment: ammonium lactate once a day, clobetasol 0.05% once a day, tretinoin 0.1% to feet q night. Methotrexate once a week; experienced chills/aches/fever/pain side effects so stopped taking it after 2nd dose (2nd wk). Feet improved after stopping it.    Personal Dermatologic History  History of skin cancer: No  History of  atypical moles: No    FAMILY HISTORY:  History of melanoma: No    Past Medical History  Past Medical History:    Allergic rhinitis    Per NG: desensitization: mold and dust mite    BPH (benign prostatic hyperplasia)    Diabetes (HCC)    Esophageal reflux    Family history of malignant neoplasm of prostate    Fx wrist    Per NG: Fx both wrists    Hemorrhoids    High blood pressure    High cholesterol    Other and unspecified hyperlipidemia    Peptic ulcer disease    Sleep apnea    USES CPAP    Stented coronary artery    Visual impairment    READERS       REVIEW OF SYSTEMS:  Constitutional: Denies fever, chills, unintentional weight loss.   Skin as per HPI    Medications  Current Outpatient Medications   Medication Sig Dispense Refill    ammonium lactate 12 % External Lotion Apply once  daily to affected areas 400 g 0    Testosterone 1.62 % Transdermal Gel Place 40.5 mg onto the skin daily. apply 1 pump to each upper arm and shoulder area to deliver a total dose of of 40.5 mg daily, each actuation on the pumper delivers 20.25 mg). 75 g 3    Tri-Mix Standard (PPP) Injection Solution 0.2 mL by Intracavernosal route as needed.     Inject intracavernosally as directed prior to intercourse     Tri-Mix Standard Recipe:  - Prostaglandin E1 5.88 ug/ml  -  Papaverine HCI 18mg/ ml  - Phentolamine Mesylate 0.6 mg/ ml      triamcinolone 0.1 % External Ointment Apply twice daily  to new itchy pink spots 454 g 0    Tretinoin 0.1 % External Cream Use thin layer once every night to feet 45 g 2    Fenofibrate 160 MG Oral Tab Take 1 tablet (160 mg total) by mouth every evening. 90 tablet 3    mometasone 0.1 % External Ointment       losartan 50 MG Oral Tab Take 1 tablet (50 mg total) by mouth daily. 90 tablet 1    Multiple Vitamin (MULTI-VITAMIN) Oral Tab multivitamin tablet, [RxNorm: 0]      Urea 40 % External Cream USE ONCE DAILY ON FEET AS DIRECTED      Tadalafil 20 MG Oral Tab Take 1 tablet (20 mg total) by mouth daily as needed for Erectile Dysfunction. 4 tablet 11    metFORMIN  MG Oral Tablet 24 Hr Take 2 tablets (1,000 mg total) by mouth daily with breakfast. 180 tablet 3    atorvastatin 20 MG Oral Tab Take 1 tablet (20 mg total) by mouth daily. 90 tablet 3    clobetasol 0.05 % External Ointment After using ketoconazole for 4 weeks start this ointment twice daily  Monday-Friday 30 g 2    ketoconazole 2 % External Cream Apply to affected area 2 times daily for 4 weeks. Use between toes as well. 60 g 3    Psyllium (METAMUCIL) 0.36 g Oral Cap       triamcinolone acetonide 0.1 % External Ointment       Fluocinonide 0.05 % External Ointment       ASPIRIN 81 OR Take 81 mg by mouth daily. Stopped 7x days prior to procedure 10/7      Multiple Vitamins-Minerals (MULTIVITAL) Oral Tab Take 1 tablet by mouth daily.         PHYSICAL EXAM:  General: awake, alert, no acute distress  Neuropsych: appropriate mood and affect  Eyes: Sclerae anicteric, without conjunctival injection, eyelids unremarkable  Skin: Skin exam was performed today including the following: feet and legs . Pertinent findings include:   - with pink scaly plaques   - legs with well defined nummular plaques    ASSESSMENT & PLAN:  Pathophysiology of diagnoses discussed with patient.  Therapeutic options reviewed.  Risks, benefits, and alternatives discussed with patient. Instructions reviewed at length.    - #Keratoderma, feet - Unable to tolerate methotrexate due to side effects. No with new pink scaly plaques on legs. Biopsy today for definitive diagnosis and will plan to start biologic.   - High risk medication monitoring - stop methotrexate at this time  - Continue amonium lactate once daily  - Continue clobetasol 0.05% once daily  - Continue tretinoin 0.1% to feet every night     Return to clinic: pending biopsy or sooner if something concerning arises     Rafat French MD

## 2024-04-15 NOTE — PROGRESS NOTES
Abhijeet Rosas is a 74 year old male.    HPI:     Chief Complaint   Patient presents with    Follow - Up     Trimix inj teaching       74-year-old male presents for intracavernosal injection teaching.  Seen in the office April 8, 2024.  Has longstanding erectile dysfunction and low serum testosterone.  Remains on AndroGel 1.62%.  Has had a chronically elevated serum PSA status post 5 previous negative prostate biopsies.  PSA remains stable most recently at 4.77 April 1, 2024.      HISTORY:  Past Medical History:    Allergic rhinitis    Per NG: desensitization: mold and dust mite    BPH (benign prostatic hyperplasia)    Diabetes (HCC)    Esophageal reflux    Family history of malignant neoplasm of prostate    Fx wrist    Per NG: Fx both wrists    Hemorrhoids    High blood pressure    High cholesterol    Other and unspecified hyperlipidemia    Peptic ulcer disease    Sleep apnea    USES CPAP    Stented coronary artery    Visual impairment    READERS      Past Surgical History:   Procedure Laterality Date    Cath bare metal stent (bms)      Colonoscopy      Colonoscopy      Colonoscopy N/A 4/11/2018    Procedure: COLONOSCOPY;  Surgeon: Clarence De La Torre MD;  Location: [facility] ENDOSCOPY    Egd      Electrocardiogram, complete  12/05/2013    scanned to media tab    Other surgical history  2010    Per NG: UP3    Other surgical history  2010    Per NG: T &A    Other surgical history  2010    Per NG: septo; turb. red; smr of turbs.    Tonsillectomy      Total knee replacement Right 02/27/2020    Upper gi endoscopy,biopsy        Family History   Problem Relation Age of Onset    Alcohol and Other Disorders Associated Father     Obesity Father     Heart Attack Father 48        Per NG: massive heart attack ( cause of death)    Cancer Mother 72        Per NG: leukemia ( cause of death)    Other (Other) Maternal Grandmother     Other (Other) Maternal Grandfather     Other (Other) Paternal Grandmother     Other (Other) Paternal  Grandfather     Kidney Disease Brother         Per NG: Renal disease      Social History:   Social History     Socioeconomic History    Marital status:    Tobacco Use    Smoking status: Former     Current packs/day: 0.00     Average packs/day: 1 pack/day for 20.0 years (20.0 ttl pk-yrs)     Types: Cigars, Cigarettes     Start date:      Quit date:      Years since quittin.2     Passive exposure: Past    Smokeless tobacco: Never   Vaping Use    Vaping status: Never Used   Substance and Sexual Activity    Alcohol use: Not Currently     Alcohol/week: 1.0 standard drink of alcohol     Comment: Social drinker    Drug use: Never    Sexual activity: Not Currently   Other Topics Concern    Caffeine Concern Yes     Comment: Per NG: coffee, 3 cups    Exercise Yes     Comment: twice a week    Reaction to local anesthetic No    Pt has a pacemaker No    Pt has a defibrillator No        Medications (Active prior to today's visit):  Current Outpatient Medications   Medication Sig Dispense Refill    ammonium lactate 12 % External Lotion Apply once  daily to affected areas 400 g 0    Testosterone 1.62 % Transdermal Gel Place 40.5 mg onto the skin daily. apply 1 pump to each upper arm and shoulder area to deliver a total dose of of 40.5 mg daily, each actuation on the pumper delivers 20.25 mg). 75 g 3    Tri-Mix Standard (PPP) Injection Solution 0.2 mL by Intracavernosal route as needed.     Inject intracavernosally as directed prior to intercourse     Tri-Mix Standard Recipe:  - Prostaglandin E1 5.88 ug/ml  - Papaverine HCI 18mg/ ml  - Phentolamine Mesylate 0.6 mg/ ml      triamcinolone 0.1 % External Ointment Apply twice daily  to new itchy pink spots 454 g 0    Tretinoin 0.1 % External Cream Use thin layer once every night to feet 45 g 2    Fenofibrate 160 MG Oral Tab Take 1 tablet (160 mg total) by mouth every evening. 90 tablet 3    mometasone 0.1 % External Ointment       losartan 50 MG Oral Tab Take 1  tablet (50 mg total) by mouth daily. 90 tablet 1    Multiple Vitamin (MULTI-VITAMIN) Oral Tab multivitamin tablet, [RxNorm: 0]      Urea 40 % External Cream USE ONCE DAILY ON FEET AS DIRECTED      Tadalafil 20 MG Oral Tab Take 1 tablet (20 mg total) by mouth daily as needed for Erectile Dysfunction. 4 tablet 11    metFORMIN  MG Oral Tablet 24 Hr Take 2 tablets (1,000 mg total) by mouth daily with breakfast. 180 tablet 3    atorvastatin 20 MG Oral Tab Take 1 tablet (20 mg total) by mouth daily. 90 tablet 3    clobetasol 0.05 % External Ointment After using ketoconazole for 4 weeks start this ointment twice daily  Monday-Friday 30 g 2    ketoconazole 2 % External Cream Apply to affected area 2 times daily for 4 weeks. Use between toes as well. 60 g 3    Psyllium (METAMUCIL) 0.36 g Oral Cap       triamcinolone acetonide 0.1 % External Ointment       Fluocinonide 0.05 % External Ointment       ASPIRIN 81 OR Take 81 mg by mouth daily. Stopped 7x days prior to procedure 10/7      Multiple Vitamins-Minerals (MULTIVITAL) Oral Tab Take 1 tablet by mouth daily.         Allergies:  No Known Allergies      ROS:       PHYSICAL EXAM:   Injected with 0.3 mL to the right mid shaft of the penis laterally of the standard solution.     ASSESSMENT/PLAN:   Assessment   Encounter Diagnoses   Name Primary?    Vasculogenic erectile dysfunction, unspecified vasculogenic erectile dysfunction type Yes    Low testosterone        Reviewed findings with patient.  Advised him to go to the ER if he develops Priapism.  Otherwise follow-up in 6 months as discussed.         Orders This Visit:  No orders of the defined types were placed in this encounter.      Meds This Visit:  Requested Prescriptions      No prescriptions requested or ordered in this encounter       Imaging & Referrals:  None     4/15/2024  Libertad Vasquez MD

## 2024-04-23 ENCOUNTER — TELEPHONE (OUTPATIENT)
Dept: SURGERY | Facility: CLINIC | Age: 74
End: 2024-04-23

## 2024-04-23 NOTE — TELEPHONE ENCOUNTER
Received approval for Testosterone 1.62% Gel from [employer]. Coverage starts 01/24/2024 and will end 04/23/2025.

## 2024-04-23 NOTE — TELEPHONE ENCOUNTER
Received fax from pharmacy requesting prior authorization for Testosterone 1.62% gel. Submitted through cover my meds. Will await response.     KEY: [id number]

## 2024-04-24 ENCOUNTER — OFFICE VISIT (OUTPATIENT)
Dept: DERMATOLOGY CLINIC | Facility: CLINIC | Age: 74
End: 2024-04-24

## 2024-04-24 DIAGNOSIS — L85.9 HYPERKERATOSIS: ICD-10-CM

## 2024-04-24 DIAGNOSIS — L20.9 ATOPIC DERMATITIS, UNSPECIFIED TYPE: Primary | ICD-10-CM

## 2024-04-24 PROCEDURE — 99214 OFFICE O/P EST MOD 30 MIN: CPT | Performed by: STUDENT IN AN ORGANIZED HEALTH CARE EDUCATION/TRAINING PROGRAM

## 2024-04-24 NOTE — PROGRESS NOTES
April 24, 2024    Established patient     CHIEF COMPLAINT: Test results    HISTORY OF PRESENT ILLNESS: .    1. Porokeratosis  Location: R lower leg   Duration: Over 1 year  Signs and symptoms: None  Current treatment: None  Past treatments: None      DERM HISTORY:  History of skin cancer: No  History of chronic skin disease/condition: No    FAMILY HISTORY:  History of melanoma: No  History of chronic skin disease/condition: No    History/Other:    REVIEW OF SYSTEMS:  Constitutional: Denies fever, chills, unintentional weight loss.   Skin as per HPI    PAST MEDICAL HISTORY:  Past Medical History:    Allergic rhinitis    Per NG: desensitization: mold and dust mite    BPH (benign prostatic hyperplasia)    Diabetes (HCC)    Esophageal reflux    Family history of malignant neoplasm of prostate    Fx wrist    Per NG: Fx both wrists    Hemorrhoids    High blood pressure    High cholesterol    Other and unspecified hyperlipidemia    Peptic ulcer disease    Sleep apnea    USES CPAP    Stented coronary artery    Visual impairment    READERS       Medications  Current Outpatient Medications   Medication Sig Dispense Refill    ammonium lactate 12 % External Lotion Apply once  daily to affected areas 400 g 0    Testosterone 1.62 % Transdermal Gel Place 40.5 mg onto the skin daily. apply 1 pump to each upper arm and shoulder area to deliver a total dose of of 40.5 mg daily, each actuation on the pumper delivers 20.25 mg). 75 g 3    Tri-Mix Standard (PPP) Injection Solution 0.2 mL by Intracavernosal route as needed.     Inject intracavernosally as directed prior to intercourse     Tri-Mix Standard Recipe:  - Prostaglandin E1 5.88 ug/ml  - Papaverine HCI 18mg/ ml  - Phentolamine Mesylate 0.6 mg/ ml      triamcinolone 0.1 % External Ointment Apply twice daily  to new itchy pink spots (Patient not taking: Reported on 4/15/2024) 454 g 0    Tretinoin 0.1 % External Cream Use thin layer once every night to feet 45 g 2    Fenofibrate 160  MG Oral Tab Take 1 tablet (160 mg total) by mouth every evening. 90 tablet 3    mometasone 0.1 % External Ointment       losartan 50 MG Oral Tab Take 1 tablet (50 mg total) by mouth daily. 90 tablet 1    Multiple Vitamin (MULTI-VITAMIN) Oral Tab multivitamin tablet, [RxNorm: 0]      Urea 40 % External Cream USE ONCE DAILY ON FEET AS DIRECTED (Patient not taking: Reported on 4/15/2024)      Tadalafil 20 MG Oral Tab Take 1 tablet (20 mg total) by mouth daily as needed for Erectile Dysfunction. 4 tablet 11    metFORMIN  MG Oral Tablet 24 Hr Take 2 tablets (1,000 mg total) by mouth daily with breakfast. 180 tablet 3    atorvastatin 20 MG Oral Tab Take 1 tablet (20 mg total) by mouth daily. 90 tablet 3    clobetasol 0.05 % External Ointment After using ketoconazole for 4 weeks start this ointment twice daily  Monday-Friday 30 g 2    ketoconazole 2 % External Cream Apply to affected area 2 times daily for 4 weeks. Use between toes as well. 60 g 3    Psyllium (METAMUCIL) 0.36 g Oral Cap       triamcinolone acetonide 0.1 % External Ointment  (Patient not taking: Reported on 4/15/2024)      Fluocinonide 0.05 % External Ointment       ASPIRIN 81 OR Take 81 mg by mouth daily. Stopped 7x days prior to procedure 10/7      Multiple Vitamins-Minerals (MULTIVITAL) Oral Tab Take 1 tablet by mouth daily.         Objective:    PHYSICAL EXAM:  General: awake, alert, no acute distress  Skin: Skin exam was performed today including the following: hands and feet. Pertinent findings include:   - with hyperkeratosis and fissuring    ASSESSMENT & PLAN:  Pathophysiology of diagnoses discussed with patient.  Therapeutic options reviewed. Risks, benefits, and alternatives discussed with patient. Instructions reviewed at length.    #Hyperkeratosis   #Atopic dermatitis, 10% BSA   - Continue tretinoin, clobetasol, and lac-hydrin  - Plan to start Dupixent (dupilumab), an IL-4 receptor alpha antagonist indicated for moderate to severe atopic  dermatitis whose disease is not controlled by topicals  - Dupixent prescribed today and pending insurance approval. Inject 2 syringes (600 mg) SQ on day 1, then inject 1 syringe (300 mg) day 15 and every 2 weeks thereafter. (ADRs discussed, but not limited to: injection site reactions, hypersensitivity reaction, conjunctivitis, blepharitis, oral herpes, keratitis, eye pruritus, other herpes simplex virus infection, and dry eye.)  - No live vaccines     Return to clinic: 3 months or sooner if something concerning arises     Rafat French MD

## 2024-04-25 ENCOUNTER — TELEPHONE (OUTPATIENT)
Dept: DERMATOLOGY CLINIC | Facility: CLINIC | Age: 74
End: 2024-04-25

## 2024-04-25 NOTE — TELEPHONE ENCOUNTER
Enrollment forms, pt chart and pt insurance faxed to [facility] and [facility].   Forms placed in PA Folder.

## 2024-05-16 ENCOUNTER — TELEPHONE (OUTPATIENT)
Dept: SURGERY | Facility: CLINIC | Age: 74
End: 2024-05-16

## 2024-05-16 NOTE — TELEPHONE ENCOUNTER
Received fax from [facility]. Per message, \"patient requesting full RX for Timix 2.5 ml. If approved please confirm directions patent to start injecting.\"    Patient had visit with VAL Orozco on 4/15/24 for test dose teaching. Will place prescription on  desk.

## 2024-06-01 DIAGNOSIS — I10 ESSENTIAL HYPERTENSION: Chronic | ICD-10-CM

## 2024-06-01 DIAGNOSIS — E11.9 TYPE 2 DIABETES MELLITUS WITHOUT COMPLICATION, WITHOUT LONG-TERM CURRENT USE OF INSULIN (HCC): ICD-10-CM

## 2024-06-01 NOTE — TELEPHONE ENCOUNTER
Please review; protocol failed/ has no protocol      Routing to on call doctor as  Is out of office          Julita Key8 hours ago (9:55 AM)     KT  Patients spouse called requesting a refill on the following medication:     metFORMIN  MG Oral Tablet 24 Hr      Per spouse, patient is completely out of medication.        Requested Prescriptions   Pending Prescriptions Disp Refills    metFORMIN  MG Oral Tablet 24 Hr 180 tablet 3     Sig: Take 2 tablets (1,000 mg total) by mouth daily with breakfast.       Diabetes Medication Protocol Failed - 6/1/2024  6:25 PM        Failed - Last A1C < 7.5 and within past 6 months     Lab Results   Component Value Date    A1C 7.1 (A) 10/17/2023             Failed - In person appointment or virtual visit in the past 6 mos or appointment in next 3 mos     Recent Outpatient Visits              1 month ago Atopic dermatitis, unspecified type    [facility] Rafat French MD    Office Visit    1 month ago Vasculogenic erectile dysfunction, unspecified vasculogenic erectile dysfunction type    [facility] [place] Libertad Vasquez MD    Office Visit    1 month ago Rash and other nonspecific skin eruption    [facility] [address], [place] Rafat French MD    Office Visit    1 month ago Low testosterone    [facility]    Office Visit    2 months ago Medication monitoring encounter    [facility]    Office Visit          Future Appointments         Provider Department Appt Notes    In 4 months Aaron Gold MD [facility]     In 4 months Libertad Vasquez MD [facility] [place] 6 mo f/u                    Passed - Microalbumin procedure in past 12 months or taking  ACE/ARB        Passed - EGFRCR or GFRNAA > 50     GFR Evaluation  EGFRCR: 72 , resulted on 4/1/2024          Passed - GFR in the past 12 months           Recent Outpatient Visits              1 month [facility], [facility]    1 month ago Vasculogenic erectile dysfunction, unspecified vasculogenic erectile dysfunction type    [facility] Libertad Vasquez MD    Office Visit    1 month ago [facility] and other [facility]    [facility]    1 month ago [facility] testosterone    [facility]    Office Visit    2 months ago Medication monitoring encounter    [facility] Rafat French MD    Office Visit          Future Appointments         Provider Department Appt Notes    In 4 months Aaron Gold MD [facility]     In 4 months [facility] f/u

## 2024-06-01 NOTE — TELEPHONE ENCOUNTER
Patient's spouse called to request a refill on below medication. Patient's spouse advised Dr. Gold will be patient's PCP moving forward.  [place] on file verified.       Medication Quantity Refills Start End   losartan 50 MG Oral Tab 90 tablet 1 9/6/2023 --   Sig:   Take 1 tablet (50 mg total) by mouth daily.     Route:   Oral

## 2024-06-01 NOTE — TELEPHONE ENCOUNTER
Patients spouse called requesting a refill on the following medication:    metFORMIN  MG Oral Tablet 24 Hr     Per spouse, patient is completely out of medication.

## 2024-06-03 RX ORDER — METFORMIN HYDROCHLORIDE 500 MG/1
1000 TABLET, EXTENDED RELEASE ORAL
Qty: 180 TABLET | Refills: 0 | Status: SHIPPED | OUTPATIENT
Start: 2024-06-03

## 2024-06-05 RX ORDER — LOSARTAN POTASSIUM 50 MG/1
50 TABLET ORAL DAILY
Qty: 90 TABLET | Refills: 1 | Status: SHIPPED | OUTPATIENT
Start: 2024-06-05

## 2024-06-05 NOTE — TELEPHONE ENCOUNTER
REFILL PASSED PER [facility] PROTOCOLS  Future Appointments   Date Time Provider Department Center   10/2/2024  2:40 PM Aaron Gold MD [facility]   10/7/2024 11:20 AM Libertad Vasquez MD [facility]    Pended for 90 day supply    Requested Prescriptions   Pending Prescriptions Disp Refills    losartan 50 MG Oral Tab 90 tablet 3     Sig: Take 1 tablet (50 mg total) by mouth daily.       Hypertension Medications Protocol Passed - 6/2/2024  6:56 PM        Passed - CMP or BMP in past 12 months        Passed - Last BP reading less than 140/90     BP Readings from Last 1 Encounters:   03/11/24 128/84               Passed - In person appointment or virtual visit in the past 12 mos or appointment in next 3 mos     Recent Outpatient Visits              1 month ago Atopic dermatitis, unspecified type    [facility] Rafat French MD    Office Visit    1 month ago Vasculogenic erectile dysfunction, unspecified vasculogenic erectile dysfunction type    [facility] Libertad Vasquez MD    Office Visit    1 month ago [facility] and other nonspecific [facility]    [facility]    1 month ago Low testosterone    [facility] Libertad Vasquez MD    Office Visit    2 months ago Medication monitoring encounter    [facility] Rafat French MD    Office Visit          Future Appointments         Provider Department Appt Notes    In 3 months Aaron Gold MD [facility]     In 4 months Libertad Vasquez MD [facility] 6 mo f/u                    Passed - EGFRCR or GFRNAA > 50     GFR Evaluation  EGFRCR: 72 , resulted on 4/1/2024               Future Appointments         Provider  Department Appt Notes    In 3 months Aaron Gold MD [facility]     In 4 months Libertad Vasquez MD [facility] 6 mo f/u          Recent Outpatient Visits              1 month ago Atopic dermatitis, unspecified type    [facility] Rafat French MD    Office Visit    1 month ago Vasculogenic erectile dysfunction, unspecified vasculogenic erectile dysfunction type    [facility]    Office Visit    1 month ago Rash and other nonspecific skin eruption    [facility] [address], [place] Rafat French MD    Office Visit    1 month ago Low testosterone    [facility]    Office Visit    2 months ago Medication monitoring encounter    [facility] Rafat French MD    Office Visit

## 2024-06-17 ENCOUNTER — TELEPHONE (OUTPATIENT)
Dept: DERMATOLOGY CLINIC | Facility: CLINIC | Age: 74
End: 2024-06-17

## 2024-06-17 NOTE — TELEPHONE ENCOUNTER
Pharmacy requesting refill of       tacrolimus (PROTOPIC) 0.1 % External Ointment, Apply 1 Application topically 2 (two) times daily., Disp: 30 g, Rfl: 3

## 2024-06-26 ENCOUNTER — OFFICE VISIT (OUTPATIENT)
Dept: FAMILY MEDICINE CLINIC | Facility: CLINIC | Age: 74
End: 2024-06-26

## 2024-06-26 VITALS
HEART RATE: 68 BPM | RESPIRATION RATE: 18 BRPM | SYSTOLIC BLOOD PRESSURE: 141 MMHG | OXYGEN SATURATION: 96 % | WEIGHT: 240 LBS | BODY MASS INDEX: 35.55 KG/M2 | HEIGHT: 69 IN | DIASTOLIC BLOOD PRESSURE: 73 MMHG | TEMPERATURE: 98 F

## 2024-06-26 DIAGNOSIS — J06.9 VIRAL URI: Primary | ICD-10-CM

## 2024-06-26 DIAGNOSIS — R03.0 ELEVATED BLOOD PRESSURE READING: ICD-10-CM

## 2024-06-26 PROCEDURE — 99213 OFFICE O/P EST LOW 20 MIN: CPT | Performed by: NURSE PRACTITIONER

## 2024-06-26 NOTE — PROGRESS NOTES
CHIEF COMPLAINT:     Chief Complaint   Patient presents with    Ear Problem     Ear clogged started today, sinus congestion 5 days        HPI:   Abhijeet Rosas is a 74 year old male who presents for upper respiratory symptoms for  5 days. Patient reports  cough, congestion, and bilateral ear congestion . Symptoms have been worse since onset.  Treating symptoms with nasal flonase.   Associated symptoms include sore throat+    PT denies chest pain, SOB, or fevers.     Wife sick with cold symptoms     Eating and drinking well.     Current Outpatient Medications   Medication Sig Dispense Refill    losartan 50 MG Oral Tab Take 1 tablet (50 mg total) by mouth daily. 90 tablet 1    metFORMIN  MG Oral Tablet 24 Hr Take 2 tablets (1,000 mg total) by mouth daily with breakfast. 180 tablet 0    Fenofibrate 160 MG Oral Tab Take 1 tablet (160 mg total) by mouth every evening. 90 tablet 3    atorvastatin 20 MG Oral Tab Take 1 tablet (20 mg total) by mouth daily. 90 tablet 3    ASPIRIN 81 OR Take 81 mg by mouth daily. Stopped 7x days prior to procedure 10/7      tacrolimus (PROTOPIC) 0.1 % External Ointment Apply 1 Application topically 2 (two) times daily. 30 g 3    ammonium lactate 12 % External Lotion Apply once  daily to affected areas 400 g 0    Testosterone 1.62 % Transdermal Gel Place 40.5 mg onto the skin daily. apply 1 pump to each upper arm and shoulder area to deliver a total dose of of 40.5 mg daily, each actuation on the pumper delivers 20.25 mg). 75 g 3    Tri-Mix Standard (PPP) Injection Solution 0.2 mL by Intracavernosal route as needed.     Inject intracavernosally as directed prior to intercourse     Tri-Mix Standard Recipe:  - Prostaglandin E1 5.88 ug/ml  - Papaverine HCI 18mg/ ml  - Phentolamine Mesylate 0.6 mg/ ml      triamcinolone 0.1 % External Ointment Apply twice daily  to new itchy pink spots (Patient not taking: Reported on 4/15/2024) 454 g 0    Tretinoin 0.1 % External Cream Use thin layer once  every night to feet 45 g 2    mometasone 0.1 % External Ointment       Multiple Vitamin (MULTI-VITAMIN) Oral Tab multivitamin tablet, [RxNorm: 0]      Urea 40 % External Cream USE ONCE DAILY ON FEET AS DIRECTED (Patient not taking: Reported on 4/15/2024)      Tadalafil 20 MG Oral Tab Take 1 tablet (20 mg total) by mouth daily as needed for Erectile Dysfunction. 4 tablet 11    clobetasol 0.05 % External Ointment After using ketoconazole for 4 weeks start this ointment twice daily  Monday-Friday 30 g 2    ketoconazole 2 % External Cream Apply to affected area 2 times daily for 4 weeks. Use between toes as well. 60 g 3    Psyllium (METAMUCIL) 0.36 g Oral Cap       triamcinolone acetonide 0.1 % External Ointment  (Patient not taking: Reported on 4/15/2024)      Fluocinonide 0.05 % External Ointment       Multiple Vitamins-Minerals (MULTIVITAL) Oral Tab Take 1 tablet by mouth daily.        Past Medical History:    Allergic rhinitis    Per NG: desensitization: mold and dust mite    BPH (benign prostatic hyperplasia)    Diabetes (HCC)    Esophageal reflux    Family history of malignant neoplasm of prostate    Fx wrist    Per NG: Fx both wrists    Hemorrhoids    High blood pressure    High cholesterol    Other and unspecified hyperlipidemia    Peptic ulcer disease    Sleep apnea    USES CPAP    Stented coronary artery    Visual impairment    READERS      Past Surgical History:   Procedure Laterality Date    Cath bare metal stent (bms)      Colonoscopy      Colonoscopy      Colonoscopy N/A 4/11/2018    Procedure: COLONOSCOPY;  Surgeon: Clarence De La Torre MD;  Location: [facility] ENDOSCOPY    Egd      Electrocardiogram, complete  12/05/2013    scanned to media tab    Other surgical history  2010    Per NG: UP3    Other surgical history  2010    Per NG: T &A    Other surgical history  2010    Per NG: septo; turb. red; smr of turbs.    Tonsillectomy      Total knee replacement Right 02/27/2020    Upper gi endoscopy,biopsy            Social History     Socioeconomic History    Marital status:    Tobacco Use    Smoking status: Former     Current packs/day: 0.00     Average packs/day: 1 pack/day for 20.0 years (20.0 ttl pk-yrs)     Types: Cigars, Cigarettes     Start date:      Quit date:      Years since quittin.4     Passive exposure: Past    Smokeless tobacco: Never   Vaping Use    Vaping status: Never Used   Substance and Sexual Activity    Alcohol use: Not Currently     Alcohol/week: 1.0 standard drink of alcohol     Comment: Social drinker    Drug use: Never    Sexual activity: Not Currently   Other Topics Concern    Caffeine Concern Yes     Comment: Per NG: coffee, 3 cups    Exercise Yes     Comment: twice a week    Reaction to local anesthetic No    Pt has a pacemaker No    Pt has a defibrillator No         REVIEW OF SYSTEMS:   GENERAL: good appetite  SKIN: no rashes or abnormal skin lesions  HEENT: See HPI  LUNGS: denies shortness of breath or wheezing, See HPI  CARDIOVASCULAR: denies chest pain or palpitations   GI: denies N/V/C or abdominal pain  NEURO: Denies headaches    EXAM:   /73   Pulse 68   Temp 97.5 °F (36.4 °C)   Resp 18   Ht 5' 9\" (1.753 m)   Wt 240 lb (108.9 kg)   SpO2 96%   BMI 35.44 kg/m²   GENERAL: well developed, well nourished,in no apparent distress  SKIN: no rashes,no suspicious lesions  HEAD: atraumatic, normocephalic.  No tenderness on palpation of  sinuses  EYES: conjunctiva clear, EOM intact  EARS: TM's gray, no bulging, no retraction,no fluid, bony landmarks visible  NOSE: Nostrils patent, clear nasal discharge, nasal mucosa pink   THROAT: Oral mucosa pink, moist. Posterior pharynx is not erythematous. no exudates. Tonsils 0/4.    NECK: Supple, non-tender  LUNGS: clear to auscultation bilaterally, no wheezes or rhonchi. Breathing is non labored.  CARDIO: RRR without murmur  EXTREMITIES: no cyanosis, clubbing or edema  LYMPH:  no lymphadenopathy.        ASSESSMENT AND PLAN:    Abhijeet Rosas is a 74 year old male who presents with upper respiratory symptoms that are consistent with    ASSESSMENT:   Encounter Diagnoses   Name Primary?    Viral URI Yes    Elevated blood pressure reading        PLAN: Meds as below.  Comfort care as described in Patient Instructions    Meds & Refills for this Visit:  Requested Prescriptions      No prescriptions requested or ordered in this encounter     Continue comfort care measures and OTC medications.     Discussed s/s of worsening infection/condition with Patient and importance of prompt medical re-evaluation including when to seek emergency care. Patient  voiced understanding    Increase fluids and rest. Warm steamy showers.     May consider OTC tylenol as needed and directed on packaging for pain/fever    May consider OTC guaifenesin as needed and directed on packaging to thin mucus secretions.    May consider OTC dextromethorphan as needed and directed on packaging as a cough suppressant     May consider OTC Cepacol throat lozenges as needed and directed on packaging for sore throat.     May consider OTC saline nasal spray per box instructions    All questions and concerns addressed. Encouraged Patient  to call clinic with any questions or concerns. I explained to the patient that emergent conditions may arise and to go to the ER for new, worsening or any persistent conditions.      Patient Instructions   See attached patient care instructions.      The patient indicates understanding of these issues and agrees to the plan.  The patient is asked to return if sx's persist or worsen.

## 2024-07-08 RX ORDER — TRIAMCINOLONE ACETONIDE 1 MG/G
OINTMENT TOPICAL
Qty: 454 G | Refills: 0 | Status: SHIPPED | OUTPATIENT
Start: 2024-07-08

## 2024-07-08 NOTE — TELEPHONE ENCOUNTER
triamcinolone   CHIEF COMPLAINT: Patient is a 76y old  Male who presents with a chief complaint of     HPI: HPI:  77 y/o male with PMHx of CAD s/p CABG 2 vessels s/p stent SVG to ELIZABETH 2006 with EF 55% followed by total 5 stents, PVD s/ bilateral leg stents (common iliac arteries and right external iliac artery angioplasty/stents 2015 by Dr. Plascencia) for severe claudication, HTN, HLD,  COPD, rectal bleed due to colon polyps in 2014 and admitted in 2016 for post polypectomy rectal bleeding and Ex-smoker quit after 50+ years 4 years ago presents to the ED c/o cough for 1 week, worsening last night.  Wife noticed he had a fever last night and was hypotensive, and is now concerned for PNA.  Pt initially thought symptoms were from common cold but cough has worsened.  Pt did not take daily meds today.  Currently febrile in ED with a temp of 102.3.     Patient is feeling much better. Denies chest pain except left lower ribs pain from coughing. No bleeding, abdominal pain, dysuria, hemoptysis, headache or diarrhea except one loose bowel movement in morning. He usually does 1-1/2-2 mile on treadmill 3 times week. Felt little lightheaded while trying get out of bed but no LOC. (03 Dec 2017 14:16)      PMHx: PAST MEDICAL & SURGICAL HISTORY:  CAD (coronary artery disease)  COPD (chronic obstructive pulmonary disease)  Status post bilateral inguinal hernia repair  S/P CABG (coronary artery bypass graft)        Soc Hx:     FAMILY HISTORY:  Family history of coronary artery disease in father (Father)      Allergies: Allergies    No Known Allergies    Intolerances          REVIEW OF SYSTEMS:    CONSTITUTIONAL: No weakness, fevers or chills  EYES/ENT: No visual changes;  No vertigo or throat pain   NECK: No pain or stiffness  RESPIRATORY: No cough, wheezing, hemoptysis; No shortness of breath  CARDIOVASCULAR: No chest pain or palpitations  GASTROINTESTINAL: No abdominal or epigastric pain. No nausea, vomiting, or hematemesis; No diarrhea or constipation. No melena or hematochezia.  GENITOURINARY: No dysuria, frequency or hematuria  NEUROLOGICAL: No numbness or weakness  SKIN: No itching, burning, rashes, or lesions   All other review of systems is negative unless indicated above      ICU Vital Signs Last 24 Hrs  T(C): 37.3 (05 Dec 2017 05:00), Max: 37.3 (05 Dec 2017 05:00)  T(F): 99.2 (05 Dec 2017 05:00), Max: 99.2 (05 Dec 2017 05:00)  HR: 74 (05 Dec 2017 05:00) (73 - 74)  BP: 163/83 (05 Dec 2017 05:00) (115/61 - 163/83)  BP(mean): --  ABP: --  ABP(mean): --  RR: 17 (05 Dec 2017 05:00) (17 - 17)  SpO2: 99% (05 Dec 2017 05:00) (97% - 99%)      I&O's Summary      CAPILLARY BLOOD GLUCOSE          PHYSICAL EXAM:   Constitutional: NAD, awake and alert, well-developed  HEENT: PERR, EOMI, Normal Hearing, MMM  Neck: Soft and supple, No LAD, No JVD  Respiratory: Breath sounds are clear bilaterally, No wheezing, rales or rhonchi  Cardiovascular: S1 and S2, regular rate and rhythm; syst m.  Gastrointestinal: Bowel Sounds present, soft, nontender, nondistended, no guarding, no rebound  Extremities: No peripheral edema  Vascular: 2+ peripheral pulses  Neurological: A/O x 3, no focal deficits  Musculoskeletal: 5/5 strength b/l upper and lower extremities  Skin: No rashes    MEDICATIONS  (STANDING):  ALBUTerol/ipratropium for Nebulization 3 milliLiter(s) Nebulizer every 6 hours  aspirin 325 milliGRAM(s) Oral daily  buDESOnide 160 MICROgram(s)/formoterol 4.5 MICROgram(s) Inhaler 2 Puff(s) Inhalation two times a day  cilostazol 100 milliGRAM(s) Oral two times a day  clopidogrel Tablet 75 milliGRAM(s) Oral daily  enoxaparin Injectable 40 milliGRAM(s) SubCutaneous every 24 hours  levoFLOXacin IVPB 750 milliGRAM(s) IV Intermittent every 24 hours  lisinopril 20 milliGRAM(s) Oral daily  metoprolol succinate ER 50 milliGRAM(s) Oral daily  simvastatin 20 milliGRAM(s) Oral at bedtime  sodium chloride 0.9%. 1000 milliLiter(s) (100 mL/Hr) IV Continuous <Continuous>  tiotropium 18 MICROgram(s) Capsule 1 Capsule(s) Inhalation at bedtime        LABS: All Labs Reviewed:  Blood Culture:   BNP   CBC             WBC Count: 18.7 K/uL (12-03 @ 10:56)              Hemoglobin: 15.9 g/dL (12-03 @ 10:56)              Hematocrit: 46.2 % (12-03 @ 10:56)              Mean Cell Volume: 93.0 fl (12-03 @ 10:56)              Platelet Count - Automated: 390 K/uL (12-03 @ 10:56)                            Cardiac markers             Troponin I, Serum: 0.122 ng/mL (12-03 @ 19:12)  Troponin I, Serum: 0.111 ng/mL (12-03 @ 16:21)  Troponin I, Serum: 0.201 ng/mL (12-03 @ 10:56)                             Chems        Sodium, Serum: 131 mmol/L (12-03 @ 19:12)  Sodium, Serum: 128 mmol/L (12-03 @ 10:56)          Potassium, Serum: 4.4 mmol/L (12-03 @ 19:12)  Potassium, Serum: 4.9 mmol/L (12-03 @ 10:56)          Blood Urea Nitrogen, Serum: 21 mg/dL (12-03 @ 19:12)  Blood Urea Nitrogen, Serum: 20 mg/dL (12-03 @ 10:56)          Creatinine 1.32  Creatinine 1.41                  Protein Total, Serum: 5.4 gm/dL (12-04 @ 06:21)  Protein Total, Serum: 7.5 gm/dL (12-03 @ 10:56)                  Calcium, Total Serum: 7.4 mg/dL (12-03 @ 19:12)  Calcium, Total Serum: 9.0 mg/dL (12-03 @ 10:56)                  Bilirubin Total, Serum: 1.6 mg/dL (12-04 @ 06:21)  Bilirubin Total, Serum: 2.2 mg/dL (12-03 @ 10:56)          Alanine Aminotransferase (ALT/SGPT): 12 U/L (12-04 @ 06:21)  Alanine Aminotransferase (ALT/SGPT): 19 U/L (12-03 @ 10:56)          Aspartate Aminotransferase (AST/SGOT): 7 U/L (12-04 @ 06:21)  Aspartate Aminotransferase (AST/SGOT): 15 U/L (12-03 @ 10:56)                            RADIOLOGY:    EKG:  NSR; Mild st-t changes    Telemetry:  Sinus; NSVT-8 beats    ECHO: CHIEF COMPLAINT: Patient is a 76y old  Male who presents with a chief complaint of     HPI: HPI:  77 y/o male with PMHx of CAD s/p CABG 2 vessels s/p stent SVG to ELIZABETH 2006 with EF 55% followed by total 5 stents, PVD s/ bilateral leg stents (common iliac arteries and right external iliac artery angioplasty/stents 2015 by Dr. Plascencia) for severe claudication, HTN, HLD,  COPD, rectal bleed due to colon polyps in 2014 and admitted in 2016 for post polypectomy rectal bleeding and Ex-smoker quit after 50+ years 4 years ago presents to the ED c/o cough for 1 week, worsening last night.  Wife noticed he had a fever last night and was hypotensive, and is now concerned for PNA.  Pt initially thought symptoms were from common cold but cough has worsened.  Pt did not take daily meds today.  Currently febrile in ED with a temp of 102.3.     Patient is feeling much better. Denies chest pain except left lower ribs pain from coughing. No bleeding, abdominal pain, dysuria, hemoptysis, headache or diarrhea except one loose bowel movement in morning. He usually does 1-1/2-2 mile on treadmill 3 times week. Felt little lightheaded while trying get out of bed but no LOC. (03 Dec 2017 14:16)      PMHx: PAST MEDICAL & SURGICAL HISTORY:  CAD (coronary artery disease)  COPD (chronic obstructive pulmonary disease)  Status post bilateral inguinal hernia repair  S/P CABG (coronary artery bypass graft)        Soc Hx:     FAMILY HISTORY:  Family history of coronary artery disease in father (Father)      Allergies: Allergies    No Known Allergies    Intolerances          REVIEW OF SYSTEMS:    CONSTITUTIONAL: No weakness, fevers or chills  EYES/ENT: No visual changes;  No vertigo or throat pain   NECK: No pain or stiffness  RESPIRATORY: No cough, wheezing, hemoptysis; No shortness of breath  CARDIOVASCULAR: No chest pain or palpitations  GASTROINTESTINAL: No abdominal or epigastric pain. No nausea, vomiting, or hematemesis; No diarrhea or constipation. No melena or hematochezia.  GENITOURINARY: No dysuria, frequency or hematuria  NEUROLOGICAL: No numbness or weakness  SKIN: No itching, burning, rashes, or lesions   All other review of systems is negative unless indicated above      ICU Vital Signs Last 24 Hrs  T(C): 37.3 (05 Dec 2017 05:00), Max: 37.3 (05 Dec 2017 05:00)  T(F): 99.2 (05 Dec 2017 05:00), Max: 99.2 (05 Dec 2017 05:00)  HR: 74 (05 Dec 2017 05:00) (73 - 74)  BP: 163/83 (05 Dec 2017 05:00) (115/61 - 163/83)  BP(mean): --  ABP: --  ABP(mean): --  RR: 17 (05 Dec 2017 05:00) (17 - 17)  SpO2: 99% (05 Dec 2017 05:00) (97% - 99%)      I&O's Summary      CAPILLARY BLOOD GLUCOSE          PHYSICAL EXAM:   Constitutional: NAD, awake and alert, well-developed  HEENT: PERR, EOMI, Normal Hearing, MMM  Neck: Soft and supple, No LAD, No JVD  Respiratory: Breath sounds are clear bilaterally, No wheezing, rales or rhonchi  Cardiovascular: S1 and S2, regular rate and rhythm; syst m.  Gastrointestinal: Bowel Sounds present, soft, nontender, nondistended, no guarding, no rebound  Extremities: No peripheral edema  Vascular: 2+ peripheral pulses  Neurological: A/O x 3, no focal deficits  Musculoskeletal: 5/5 strength b/l upper and lower extremities  Skin: No rashes    MEDICATIONS  (STANDING):  ALBUTerol/ipratropium for Nebulization 3 milliLiter(s) Nebulizer every 6 hours  aspirin 325 milliGRAM(s) Oral daily  buDESOnide 160 MICROgram(s)/formoterol 4.5 MICROgram(s) Inhaler 2 Puff(s) Inhalation two times a day  cilostazol 100 milliGRAM(s) Oral two times a day  clopidogrel Tablet 75 milliGRAM(s) Oral daily  enoxaparin Injectable 40 milliGRAM(s) SubCutaneous every 24 hours  levoFLOXacin IVPB 750 milliGRAM(s) IV Intermittent every 24 hours  lisinopril 20 milliGRAM(s) Oral daily  metoprolol succinate ER 50 milliGRAM(s) Oral daily  simvastatin 20 milliGRAM(s) Oral at bedtime  sodium chloride 0.9%. 1000 milliLiter(s) (100 mL/Hr) IV Continuous <Continuous>  tiotropium 18 MICROgram(s) Capsule 1 Capsule(s) Inhalation at bedtime        LABS: All Labs Reviewed:  Blood Culture:   BNP   CBC             WBC Count: 18.7 K/uL (12-03 @ 10:56)              Hemoglobin: 15.9 g/dL (12-03 @ 10:56)              Hematocrit: 46.2 % (12-03 @ 10:56)              Mean Cell Volume: 93.0 fl (12-03 @ 10:56)              Platelet Count - Automated: 390 K/uL (12-03 @ 10:56)                            Cardiac markers             Troponin I, Serum: 0.122 ng/mL (12-03 @ 19:12)  Troponin I, Serum: 0.111 ng/mL (12-03 @ 16:21)  Troponin I, Serum: 0.201 ng/mL (12-03 @ 10:56)                             Chems        Sodium, Serum: 131 mmol/L (12-03 @ 19:12)  Sodium, Serum: 128 mmol/L (12-03 @ 10:56)          Potassium, Serum: 4.4 mmol/L (12-03 @ 19:12)  Potassium, Serum: 4.9 mmol/L (12-03 @ 10:56)          Blood Urea Nitrogen, Serum: 21 mg/dL (12-03 @ 19:12)  Blood Urea Nitrogen, Serum: 20 mg/dL (12-03 @ 10:56)          Creatinine 1.32  Creatinine 1.41                  Protein Total, Serum: 5.4 gm/dL (12-04 @ 06:21)  Protein Total, Serum: 7.5 gm/dL (12-03 @ 10:56)                  Calcium, Total Serum: 7.4 mg/dL (12-03 @ 19:12)  Calcium, Total Serum: 9.0 mg/dL (12-03 @ 10:56)                  Bilirubin Total, Serum: 1.6 mg/dL (12-04 @ 06:21)  Bilirubin Total, Serum: 2.2 mg/dL (12-03 @ 10:56)          Alanine Aminotransferase (ALT/SGPT): 12 U/L (12-04 @ 06:21)  Alanine Aminotransferase (ALT/SGPT): 19 U/L (12-03 @ 10:56)          Aspartate Aminotransferase (AST/SGOT): 7 U/L (12-04 @ 06:21)  Aspartate Aminotransferase (AST/SGOT): 15 U/L (12-03 @ 10:56)                            RADIOLOGY:    EKG:  NSR; Mild st-t changes    Telemetry:  Sinus; NSVT-8 beats versus afib with aberrancy    ECHO:

## 2024-07-08 NOTE — TELEPHONE ENCOUNTER
Refill Request for medication(s): triamcinolone 0.1 % External Ointment     Last Office Visit: 04/24/24    Last Refill:01/22/24    Pharmacy, Dosage verified: [id number] [address], [phone], [phone]     Condition Update (if applicable): Appt 07/23/24     Rx pended and sent to provider for approval, please advise. Thank You!

## 2024-07-23 ENCOUNTER — OFFICE VISIT (OUTPATIENT)
Dept: DERMATOLOGY CLINIC | Facility: CLINIC | Age: 74
End: 2024-07-23

## 2024-07-23 DIAGNOSIS — Z51.81 MEDICATION MONITORING ENCOUNTER: Primary | ICD-10-CM

## 2024-07-23 PROCEDURE — 99214 OFFICE O/P EST MOD 30 MIN: CPT | Performed by: STUDENT IN AN ORGANIZED HEALTH CARE EDUCATION/TRAINING PROGRAM

## 2024-07-23 NOTE — PROGRESS NOTES
July 23, 2024    Established patient     CHIEF COMPLAINT: Hyperkeratosis    HISTORY OF PRESENT ILLNESS: .    1. Hyperkeratosis  Location: B/L fingers   Duration: 1-2 moths   Signs and symptoms: Peeling raw skin  Current treatment: ammonium lactate 12 % External Lotion, clobetasol 0.05 % External Ointment, mometasone 0.1 % External Ointment, triamcinolone acetonide 0.1 % External Ointment   Past treatments: None      DERM HISTORY:  History of skin cancer: No  History of chronic skin disease/condition: No    FAMILY HISTORY:  History of melanoma: No  History of chronic skin disease/condition: No    History/Other:    REVIEW OF SYSTEMS:  Constitutional: Denies fever, chills, unintentional weight loss.   Skin as per HPI    PAST MEDICAL HISTORY:  Past Medical History:    Allergic rhinitis    Per NG: desensitization: mold and dust mite    BPH (benign prostatic hyperplasia)    Diabetes (HCC)    Esophageal reflux    Family history of malignant neoplasm of prostate    Fx wrist    Per NG: Fx both wrists    Hemorrhoids    High blood pressure    High cholesterol    Other and unspecified hyperlipidemia    Peptic ulcer disease    Sleep apnea    USES CPAP    Stented coronary artery    Visual impairment    READERS       Medications  Current Outpatient Medications   Medication Sig Dispense Refill    triamcinolone 0.1 % External Ointment Apply twice daily  to new itchy pink spots 454 g 0    tacrolimus (PROTOPIC) 0.1 % External Ointment Apply 1 Application topically 2 (two) times daily. 30 g 3    losartan 50 MG Oral Tab Take 1 tablet (50 mg total) by mouth daily. 90 tablet 1    metFORMIN  MG Oral Tablet 24 Hr Take 2 tablets (1,000 mg total) by mouth daily with breakfast. 180 tablet 0    ammonium lactate 12 % External Lotion Apply once  daily to affected areas 400 g 0    Testosterone 1.62 % Transdermal Gel Place 40.5 mg onto the skin daily. apply 1 pump to each upper arm and shoulder area to deliver a total dose of of 40.5 mg  daily, each actuation on the pumper delivers 20.25 mg). 75 g 3    Tri-Mix Standard (PPP) Injection Solution 0.2 mL by Intracavernosal route as needed.     Inject intracavernosally as directed prior to intercourse     Tri-Mix Standard Recipe:  - Prostaglandin E1 5.88 ug/ml  - Papaverine HCI 18mg/ ml  - Phentolamine Mesylate 0.6 mg/ ml      Tretinoin 0.1 % External Cream Use thin layer once every night to feet 45 g 2    Fenofibrate 160 MG Oral Tab Take 1 tablet (160 mg total) by mouth every evening. 90 tablet 3    mometasone 0.1 % External Ointment       Multiple Vitamin (MULTI-VITAMIN) Oral Tab multivitamin tablet, [RxNorm: 0]      Urea 40 % External Cream USE ONCE DAILY ON FEET AS DIRECTED (Patient not taking: Reported on 4/15/2024)      Tadalafil 20 MG Oral Tab Take 1 tablet (20 mg total) by mouth daily as needed for Erectile Dysfunction. 4 tablet 11    atorvastatin 20 MG Oral Tab Take 1 tablet (20 mg total) by mouth daily. 90 tablet 3    clobetasol 0.05 % External Ointment After using ketoconazole for 4 weeks start this ointment twice daily  Monday-Friday 30 g 2    ketoconazole 2 % External Cream Apply to affected area 2 times daily for 4 weeks. Use between toes as well. 60 g 3    Psyllium (METAMUCIL) 0.36 g Oral Cap       triamcinolone acetonide 0.1 % External Ointment  (Patient not taking: Reported on 4/15/2024)      Fluocinonide 0.05 % External Ointment       ASPIRIN 81 OR Take 81 mg by mouth daily. Stopped 7x days prior to procedure 10/7      Multiple Vitamins-Minerals (MULTIVITAL) Oral Tab Take 1 tablet by mouth daily.         Objective:    PHYSICAL EXAM:  General: awake, alert, no acute distress  Skin: Skin exam was performed today including the following: habds and feet. Pertinent findings include:   - with hyperkeratosis    ASSESSMENT & PLAN:  Pathophysiology of diagnoses discussed with patient.  Therapeutic options reviewed. Risks, benefits, and alternatives discussed with patient. Instructions reviewed  at length.    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  -Stop Dupixent approval process at this time.  -Pt declines h/o TB infection, malignancy, active infection  -Discussed potential risks of Skyrizi including: increased risk of infections (including TB), fatigue, injection site reactions, hypersensitivity, headache, increased liver enzymes, upper respiratory infections, fungal skin infections. Possible increased risk of malignancy.   -Medication should not be used in patients with chronic infections or h/o recurrent infections  -It is unclear whether this medication may increase patient's risk for COVID-19 infection or severity of infection should they contract COVID-19. Practice strict infection protection measures.   -Patient expressed understanding of risks and agreeable to starting therapy.    -Pending normal labs would start Skyrizi as follows - Two consecutive injections at different anatomic locations (75 mg each) for a total dose of 150 mg at weeks 0, 4 and then every 12 weeks thereafter.   -Pt should notify us when medication to be delivered. Pt should schedule nursing visit for injection training.   -Pt should notify us if they develop any infections at which time medication would be discontinued until resolved.     High-risk Medication monitoring:   -Remote hepatitis panel negative  -TB screening negative, repeat annually  -No live vaccines  -Flu shot yearly  -Not safe in pregnancy/breastfeeding    Return to clinic in 3 months  Rafat French MD

## 2024-07-24 ENCOUNTER — TELEPHONE (OUTPATIENT)
Dept: DERMATOLOGY CLINIC | Facility: CLINIC | Age: 74
End: 2024-07-24

## 2024-07-24 NOTE — TELEPHONE ENCOUNTER
Pt to start Skyrizi. Stop Dupixent approval process.   Skyrizi enrollment forms and supporting documentation faxed to [employer].

## 2024-08-30 DIAGNOSIS — E11.9 TYPE 2 DIABETES MELLITUS WITHOUT COMPLICATION, WITHOUT LONG-TERM CURRENT USE OF INSULIN (HCC): ICD-10-CM

## 2024-09-03 RX ORDER — METFORMIN HCL 500 MG
1000 TABLET, EXTENDED RELEASE 24 HR ORAL
Qty: 180 TABLET | Refills: 3 | OUTPATIENT
Start: 2024-09-03

## 2024-09-03 NOTE — TELEPHONE ENCOUNTER
Dr. Marquez: Please see below- do you want patient to schedule an appointment for refill? Thanks    Medication was previously prescribed by Dr. Del Castillo: one year supply last given to patient 5/2/23    *last refill given on 6/3/24 per Dr. Gold: Refer to 6/1/24 Refill encounter     *Last office visit with a primary physician: Dr. Del Castillo- 10/17/23 for Medicare well exam     Disp Refills Start End    metFORMIN  MG Oral Tablet 24 Hr 180 tablet 0 6/3/2024 --    Sig - Route: Take 2 tablets (1,000 mg total) by mouth daily with breakfast. - Oral    Sent to pharmacy as: metFORMIN HCl  MG Oral Tablet Extended Release 24 Hour (Glucophage XR)    E-Prescribing Status: Receipt confirmed by pharmacy (6/3/2024  7:48 AM CDT)      Print Trail   Printed On Printed By Printed To Report   6/3/24 7:47 AM Aaron Gold MD [facility] Generic Report

## 2024-09-04 RX ORDER — METFORMIN HCL 500 MG
1000 TABLET, EXTENDED RELEASE 24 HR ORAL
Qty: 180 TABLET | Refills: 0 | Status: SHIPPED | OUTPATIENT
Start: 2024-09-04

## 2024-09-04 NOTE — TELEPHONE ENCOUNTER
Future Appointments   Date Time Provider Department Center   10/2/2024  2:40 PM Aaron Gold MD [facility]

## 2024-09-07 DIAGNOSIS — R79.89 LOW TESTOSTERONE: ICD-10-CM

## 2024-09-11 ENCOUNTER — TELEPHONE (OUTPATIENT)
Dept: SURGERY | Facility: CLINIC | Age: 74
End: 2024-09-11

## 2024-09-11 RX ORDER — TESTOSTERONE 20.25 MG/1.25G
GEL TOPICAL
Qty: 75 G | Refills: 0 | Status: SHIPPED | OUTPATIENT
Start: 2024-09-11

## 2024-09-11 NOTE — TELEPHONE ENCOUNTER
Patient's spouse calling back to check on status of refill. She states patient is leaving on Friday out of town and needs refill before then. Please advise.

## 2024-09-11 NOTE — TELEPHONE ENCOUNTER
Received refill request from [facility] pharmacy for Tri-mix refill, will place form on 's desk to review and complete.

## 2024-09-13 NOTE — TELEPHONE ENCOUNTER
signed refill request form. Form faxed to [employer] fax to [phone]. Received fax confirmation.

## 2024-10-01 ENCOUNTER — OFFICE VISIT (OUTPATIENT)
Dept: DERMATOLOGY CLINIC | Facility: CLINIC | Age: 74
End: 2024-10-01
Payer: MEDICARE

## 2024-10-01 DIAGNOSIS — L30.9 HAND DERMATITIS: ICD-10-CM

## 2024-10-01 DIAGNOSIS — L81.4 LENTIGINES: ICD-10-CM

## 2024-10-01 DIAGNOSIS — D22.9 MULTIPLE BENIGN NEVI: ICD-10-CM

## 2024-10-01 DIAGNOSIS — L57.0 MULTIPLE ACTINIC KERATOSES: ICD-10-CM

## 2024-10-01 DIAGNOSIS — D18.01 CHERRY ANGIOMA: ICD-10-CM

## 2024-10-01 DIAGNOSIS — L82.1 SEBORRHEIC KERATOSES: ICD-10-CM

## 2024-10-01 DIAGNOSIS — G56.10 MEDIAN MONONEUROPATHY, UNSPECIFIED LATERALITY: Primary | ICD-10-CM

## 2024-10-01 PROCEDURE — 17003 DESTRUCT PREMALG LES 2-14: CPT | Performed by: STUDENT IN AN ORGANIZED HEALTH CARE EDUCATION/TRAINING PROGRAM

## 2024-10-01 PROCEDURE — 17000 DESTRUCT PREMALG LESION: CPT | Performed by: STUDENT IN AN ORGANIZED HEALTH CARE EDUCATION/TRAINING PROGRAM

## 2024-10-01 PROCEDURE — 99214 OFFICE O/P EST MOD 30 MIN: CPT | Performed by: STUDENT IN AN ORGANIZED HEALTH CARE EDUCATION/TRAINING PROGRAM

## 2024-10-01 RX ORDER — MYCOPHENOLATE MOFETIL 500 MG/1
500 TABLET ORAL 2 TIMES DAILY
Qty: 60 TABLET | Refills: 0 | Status: SHIPPED | OUTPATIENT
Start: 2024-10-01

## 2024-10-01 NOTE — PROGRESS NOTES
October 1, 2024    Established patient     Referred by:   No referring provider defined for this encounter.     CHIEF COMPLAINT: FBSE    HISTORY OF PRESENT ILLNESS: .    Porokeratosis   Location: R lower leg    Signs and symptoms: bx spot still not healed, pt is itchy and some discharge occurred last week   Current treatment: none   Past treatments: Bx DX: Porokeratosis     2. Hyperkeratosis   Location: feet    Signs and symptoms: still itchy   Past treatments: tacrolimus (PROTOPIC) 0.1 % Ointment and triamcinolone 0.1 % Ointment     DERM HISTORY:  History of skin cancer: No    FAMILY HISTORY:  History of melanoma: No    PAST MEDICAL HISTORY:  Past Medical History:    Allergic rhinitis    Per NG: desensitization: mold and dust mite    BPH (benign prostatic hyperplasia)    Diabetes (HCC)    Esophageal reflux    Family history of malignant neoplasm of prostate    Fx wrist    Per NG: Fx both wrists    Hemorrhoids    High blood pressure    High cholesterol    Other and unspecified hyperlipidemia    Peptic ulcer disease    Sleep apnea    USES CPAP    Stented coronary artery    Visual impairment    READERS       REVIEW OF SYSTEMS:  Constitutional: Denies fever, chills, unintentional weight loss.   Skin as per HPI    Medications  Current Outpatient Medications   Medication Sig Dispense Refill    TESTOSTERONE 1.62 % Transdermal Gel APPLY 1 PUMP TO EACH UPPER ARM AND SHOULDER AREA TOPICALLY ONCE DAILY TO DELIVER A TOTAL DOSE OF 40.5 MG 75 g 0    metFORMIN  MG Oral Tablet 24 Hr Take 2 tablets (1,000 mg total) by mouth daily with breakfast. 180 tablet 0    triamcinolone 0.1 % External Ointment Apply twice daily  to new itchy pink spots 454 g 0    tacrolimus (PROTOPIC) 0.1 % External Ointment Apply 1 Application topically 2 (two) times daily. 30 g 3    losartan 50 MG Oral Tab Take 1 tablet (50 mg total) by mouth daily. 90 tablet 1    ammonium lactate 12 % External Lotion Apply once  daily to affected areas 400 g 0     Tri-Mix Standard (PPP) Injection Solution 0.2 mL by Intracavernosal route as needed.     Inject intracavernosally as directed prior to intercourse     Tri-Mix Standard Recipe:  - Prostaglandin E1 5.88 ug/ml  - Papaverine HCI 18mg/ ml  - Phentolamine Mesylate 0.6 mg/ ml      Tretinoin 0.1 % External Cream Use thin layer once every night to feet 45 g 2    Fenofibrate 160 MG Oral Tab Take 1 tablet (160 mg total) by mouth every evening. 90 tablet 3    mometasone 0.1 % External Ointment       Multiple Vitamin (MULTI-VITAMIN) Oral Tab multivitamin tablet, [RxNorm: 0]      Urea 40 % External Cream USE ONCE DAILY ON FEET AS DIRECTED (Patient not taking: Reported on 4/15/2024)      Tadalafil 20 MG Oral Tab Take 1 tablet (20 mg total) by mouth daily as needed for Erectile Dysfunction. 4 tablet 11    atorvastatin 20 MG Oral Tab Take 1 tablet (20 mg total) by mouth daily. 90 tablet 3    clobetasol 0.05 % External Ointment After using ketoconazole for 4 weeks start this ointment twice daily  Monday-Friday 30 g 2    ketoconazole 2 % External Cream Apply to affected area 2 times daily for 4 weeks. Use between toes as well. 60 g 3    Psyllium (METAMUCIL) 0.36 g Oral Cap       triamcinolone acetonide 0.1 % External Ointment  (Patient not taking: Reported on 4/15/2024)      Fluocinonide 0.05 % External Ointment       ASPIRIN 81 OR Take 81 mg by mouth daily. Stopped 7x days prior to procedure 10/7      Multiple Vitamins-Minerals (MULTIVITAL) Oral Tab Take 1 tablet by mouth daily.         PHYSICAL EXAM:  Patient declined chaperone   General: awake, alert, no acute distress  Skin: Skin exam was performed today including the following: head and face, scalp, neck, chest (including breasts and axillae), abdomen, back, bilateral upper extremities, bilateral lower extremities, hands, feet, digits, nails. Pertinent findings include:   - Scattered bright red-purple dome-shaped papules on the trunk and extremities   - Scattered light brown  stellate macules on sun exposed sites  - Scattered, evenly colored, round brown macules and papules with regular borders on the trunk and extremities  - Numerous scattered skin-colored and brown, waxy, stuck-on papules and plaques on the trunk and extremities      ASSESSMENT & PLAN:  Pathophysiology of diagnoses discussed with patient.  Therapeutic options reviewed. Risks, benefits, and alternatives discussed with patient. Instructions reviewed at length.    #Lentigines  #Seborrheic keratoses   #Cherry angiomas   - Reassurance provided regarding the benign nature of these lesions.    #Multiple benign nevi  - Complete skin exam performed today with no outlier lesions identified   - Reassured patient of benign nature of these lesions.   - Recommend daily photoprotection with broad-spectrum sunscreen, avoidance of sun during peak hours, and sun protective clothing.    - Dermoscopy was used for physical examination of pigmented lesions during today's office visit.    #Hand dermatitis  - Start cellcept 500mg twice daily . Can increase in future. Risks, benefits, and alternatives discussed with patient.  - Labs today then repeat in 1 month at at 3 month edu    #Multiple actinic keratoses  - Discussed premalignant etiology and possibility of transformation to SCC  - Recommended cryotherapy today   - Discussed side effects including redness, swelling, crusting, and discolortion after treatment, wound care with soap/water and vaseline   - Recommend sun protection with spf 30 or higher, sun protective clothing such as wide brimmed hats and long sleeves. Recommend avoiding midday sun (10 am- 3 pm).     - Procedure Note Cryosurgery of pre-malignant lesion(s)  Risks, benefits, alternatives, complications, and personnel required for cryosurgery reviewed with patient. Patient verbalizes understanding and wishes to proceed.   - Cryosurgery performed with Liquid Nitrogen via cryostat spray gun to Actinic Keratosis . 6 lesion(s)  treated.   - Patient tolerated well and wound care discussed. Return if lesions fail to fully resolve.       Return to clinic: 3 months or sooner if something concerning arises     Rafat French MD

## 2024-10-02 ENCOUNTER — OFFICE VISIT (OUTPATIENT)
Dept: INTERNAL MEDICINE CLINIC | Facility: CLINIC | Age: 74
End: 2024-10-02
Payer: MEDICARE

## 2024-10-02 ENCOUNTER — TELEPHONE (OUTPATIENT)
Dept: DERMATOLOGY CLINIC | Facility: CLINIC | Age: 74
End: 2024-10-02

## 2024-10-02 VITALS
HEART RATE: 62 BPM | TEMPERATURE: 98 F | RESPIRATION RATE: 20 BRPM | DIASTOLIC BLOOD PRESSURE: 76 MMHG | SYSTOLIC BLOOD PRESSURE: 138 MMHG | WEIGHT: 244 LBS | BODY MASS INDEX: 36.14 KG/M2 | HEIGHT: 69 IN

## 2024-10-02 DIAGNOSIS — R79.89 LOW TESTOSTERONE: ICD-10-CM

## 2024-10-02 DIAGNOSIS — I10 ESSENTIAL HYPERTENSION: ICD-10-CM

## 2024-10-02 DIAGNOSIS — E78.2 MIXED HYPERLIPIDEMIA: ICD-10-CM

## 2024-10-02 DIAGNOSIS — N52.9 ERECTILE DYSFUNCTION, UNSPECIFIED ERECTILE DYSFUNCTION TYPE: ICD-10-CM

## 2024-10-02 DIAGNOSIS — Z23 NEED FOR VACCINATION: ICD-10-CM

## 2024-10-02 DIAGNOSIS — G47.33 OBSTRUCTIVE SLEEP APNEA ON CPAP: ICD-10-CM

## 2024-10-02 DIAGNOSIS — E11.9 TYPE 2 DIABETES MELLITUS WITHOUT COMPLICATION, WITHOUT LONG-TERM CURRENT USE OF INSULIN (HCC): ICD-10-CM

## 2024-10-02 DIAGNOSIS — Z00.00 ENCOUNTER FOR ANNUAL HEALTH EXAMINATION: Primary | ICD-10-CM

## 2024-10-02 DIAGNOSIS — R97.20 ELEVATED PSA: ICD-10-CM

## 2024-10-02 DIAGNOSIS — K46.9 HERNIA: ICD-10-CM

## 2024-10-02 NOTE — TELEPHONE ENCOUNTER
Fax from [facility] Medicare RX    Request additional information. For MYCOPHENOLATE MOFETIL   GYN Consult    CC/reason for consult: bleeding on Nexplanon    HPI: Vanessa Sanchez is a 24 y.o.  with c/o bleeding that has been ongoing for the past month. She had a Nexplanon placed in the OR on 19 (for removal of retained IUD and Nexplanon placement). Denies lightheadedness or palpations. States the bleeding is like a period bleed.       ROS:  constitutional: denies fevers, general concerns  CV: denies chest pain, palpitations, edema  Resp: denies shortness of breath, cough  GI: denies abd pain, N/V, diarrhea/constipation, blood in stool  : denies irregular vaginal bleeding, discharge, pain, denies urinary complaints  Neuro: denies hx of migraines w/ aura  Endo: denies significant weight changes, irregular menses, temperature intolerance, denies hotflashes/nightsweats  Heme/lymph: denies easy bleeding/bruising, denies swollen glands  Psych: denies concerns about mood, denies SI  Allergy: denies concerns      OB History    Para Term  AB Living   0 0 0 0 0 0   SAB TAB Ectopic Molar Multiple Live Births   0 0 0 0 0 0       Past Medical History:   Diagnosis Date   • Anxiety    • Asthma    • Asthma    • Attention deficit hyperactivity disorder (ADHD) 2019   • Borderline personality disorder (HCC)    • Depression    • Hypothyroidism 2019   • Sleep apnea    • Sleep apnea     uses c pap   • Snoring        Past Surgical History:   Procedure Laterality Date   • PB REMOVE INTRAUTERINE DEVICE  2019    Procedure: REMOVAL, INTRAUTERINE DEVICE, HYSTEROSCOPY;  Surgeon: Jamil Rick M.D.;  Location: SURGERY SAME DAY [facility];  Service: Obstetrics   • DILATION AND CURETTAGE N/A 2019    Procedure: DILATION AND CURETTAGE;  Surgeon: Jmail Rick M.D.;  Location: SURGERY SAME DAY [facility];  Service: Obstetrics   • TONSILLECTOMY AND ADENOIDECTOMY     • OTHER      Turbinate reduction       Medications:   Current Outpatient Medications Ordered in Epic    Medication Sig Dispense Refill   • estradiol (ESTRACE) 1 MG Tab Take 0.5 Tabs by mouth every day. 10 Tab 1   • busPIRone (BUSPAR) 10 MG Tab tablet TAKE 1 TABLET BY MOUTH TWICE A DAY  1   • albuterol 108 (90 Base) MCG/ACT Aero Soln inhalation aerosol Inhale 2 Puffs by mouth every 6 hours as needed for Shortness of Breath.     • fluticasone (FLONASE) 50 MCG/ACT nasal spray Spray 2 Sprays in nose every day. 1 Bottle 0   • azithromycin (ZITHROMAX) 250 MG Tab Take 2 tabs by mouth on day 1, then take 1 tab on days 2-5 6 Tab 0   • albuterol 108 (90 Base) MCG/ACT Aero Soln inhalation aerosol Inhale 2 Puffs by mouth every 6 hours as needed for Shortness of Breath. 8.5 g 0   • methylphenidate (RITALIN) 5 MG Tab Take 10 mg by mouth 2 times a day.     • venlafaxine (EFFEXOR-XR) 150 MG extended-release capsule Take 1 Cap by mouth every day. 30 Cap 3   • betamethasone dipropionate (DIPROLENE) 0.05 % Cream Apply 1 Application to affected area(s) 2 times a day. (Patient not taking: Reported on 7/23/2019) 1 Tube 0   • ALPRAZolam (XANAX) 0.5 MG Tab Take 0.5 mg by mouth at bedtime as needed for Sleep.       No current Epic-ordered facility-administered medications on file.        Allergies: Seasonal    Social History     Socioeconomic History   • Marital status: Single     Spouse name: Not on file   • Number of children: Not on file   • Years of education: Not on file   • Highest education level: Not on file   Occupational History   • Not on file   Social Needs   • Financial resource strain: Not on file   • Food insecurity:     Worry: Not on file     Inability: Not on file   • Transportation needs:     Medical: Not on file     Non-medical: Not on file   Tobacco Use   • Smoking status: Current Some Day Smoker     Packs/day: 0.00   • Smokeless tobacco: Never Used   • Tobacco comment: occass   Substance and Sexual Activity   • Alcohol use: Yes     Comment: occass   • Drug use: Yes     Types: Marijuana, Inhaled     Comment: e  cigarettes, Vaping, and MarijuAnna   • Sexual activity: Yes     Partners: Male     Birth control/protection: I.U.D.   Lifestyle   • Physical activity:     Days per week: Not on file     Minutes per session: Not on file   • Stress: Not on file   Relationships   • Social connections:     Talks on phone: Not on file     Gets together: Not on file     Attends Denominational service: Not on file     Active member of club or organization: Not on file     Attends meetings of clubs or organizations: Not on file     Relationship status: Not on file   • Intimate partner violence:     Fear of current or ex partner: Not on file     Emotionally abused: Not on file     Physically abused: Not on file     Forced sexual activity: Not on file   Other Topics Concern   • Not on file   Social History Narrative   • Not on file       Family History   Problem Relation Age of Onset   • Diabetes Mother    • Stroke Mother    • Other Mother         fibromyalgia   • No Known Problems Father    • Diabetes Maternal Grandmother    • Hyperlipidemia Maternal Grandmother    • No Known Problems Sister      Denies hx of GI/GYN/breast cancers    Physical Exam:  /68   Wt 108.4 kg (239 lb)   LMP 10/12/2019 (Exact Date)   BMI 38.00 kg/m²   gen: AAO, NAD, affect appropriate  CV: RRR; no LE edema  Resp: Symmetric non labored breathing, CTAB  Psych: normal mood and affect      A/P: 24 y.o.  with   1. Breakthrough bleeding on Nexplanon  estradiol (ESTRACE) 1 MG Tab     We discussed that breakthrough bleeding is quite common on the Nexplanon.  We can do a trial of estrogen.  Discussed that if the trial of estrogen must be repeated more than twice then she will likely have continued bleeding and we might want to consider a different form of contraception.  She states that she agrees with this and is open to trying the estrogen but if this does not work then she would like to have the Nexplanon removed and have a NuvaRing placed.    Spent 15 minutes  minutes with the patient ; Face to Face, with >50% of this time spent in counseling and coordination of care, surrounding the above mentioned issues.

## 2024-10-02 NOTE — PROGRESS NOTES
Subjective:   Abhijeet Rosas is a 74 year old male who presents for a Medicare Subsequent Annual Wellness visit (Pt already had Initial Annual Wellness) and scheduled follow up of multiple significant but stable problems.     Patient is here for Medicare annual wellness visit and follow-up on chronic medical issues.  Also here to establish care with new primary care physician.  Last saw previous doctor 1 year ago.  At that time A1c was 7.1.  Since that time he has seen GI, urology, dermatology.  Also has seen cardiology.  Has coronary artery disease with history of stent placement to LAD in 2019.  Uses CPAP for sleep apnea.  Longstanding history of low testosterone and erectile dysfunction.  Recently underwent training for injection therapy for the ED with the urology office.  History of mildly elevated PSA with negative evaluations and biopsies.  Current medications reviewed.  Health maintenance and vaccination status reviewed.  Advanced directives reviewed.    Overall, he thinks he is doing pretty good. He is seeing Derm for psoriasis of the feet; getting rx from Derm. No other issues. Uses CPAP nightly with benefit. Not checking sugars. Patient does check blood pressure at home. It has been running around 110/70. The injection rx for ED was not helpful. No tobacco. Very little EtOH. Diet is \"I try to watch what I eat.\" He was just in [place] and gained weight there. For exercise, he is active in yard and home. WIth cold weather, he goes to the gym. Weight is up several pounds.       History/Other:   Fall Risk Assessment:   He has been screened for Falls and is low risk.      Cognitive Assessment:   He had a completely normal cognitive assessment - see flowsheet entries     Functional Ability/Status:   Abhijeet Rosas has some abnormal functions as listed below:  He has Hearing problems based on screening of functional status.      Depression Screening (PHQ):  PHQ-2 SCORE: 0  , done 10/2/2024   If you checked off  any problems, how difficult have these problems made it for you to do your work, take care of things at home, or get along with other people?: Not difficult at all    Last [place] Suicide Screening on 10/2/2024 was No Risk.          Advanced Directives:   He has a Living Will on file in [facility]; reviewed and discussed documents with patient (and family/surrogate if present).  He has a Power of  for Health Care on file in [facility].  Discussed Advance Care Planning with patient (and family/surrogate if present). Standard forms made available to patient in After Visit Summary.      Patient Active Problem List   Diagnosis    Impotence of organic origin    Osteoarthritis    Mixed hyperlipidemia    Chronic allergic rhinitis due to pollen    Prostate nodule    Ex-smoker    Elevated PSA    Medicare annual wellness visit, subsequent    Fatty liver    Periumbilical hernia    History of percutaneous coronary intervention    Type 2 diabetes mellitus without complication, without long-term current use of insulin (HCC)    Chronic ischemic heart disease    Obstructive sleep apnea on CPAP    Essential hypertension    Status post right knee replacement    Wrist arthritis    Dermatitis     Allergies:  He has No Known Allergies.    Current Medications:  Outpatient Medications Marked as Taking for the 10/2/24 encounter (Office Visit) with Aaron Gold MD   Medication Sig    Mycophenolate Mofetil (CELLCEPT) 500 MG Oral Tab Take 1 tablet (500 mg total) by mouth 2 (two) times daily.    TESTOSTERONE 1.62 % Transdermal Gel APPLY 1 PUMP TO EACH UPPER ARM AND SHOULDER AREA TOPICALLY ONCE DAILY TO DELIVER A TOTAL DOSE OF 40.5 MG    triamcinolone 0.1 % External Ointment Apply twice daily  to new itchy pink spots    tacrolimus (PROTOPIC) 0.1 % External Ointment Apply 1 Application topically 2 (two) times daily.    losartan 50 MG Oral Tab Take 1 tablet (50 mg total) by mouth daily.    ammonium lactate 12 % External Lotion Apply once  daily  to affected areas    Tri-Mix Standard (PPP) Injection Solution 0.2 mL by Intracavernosal route as needed.     Inject intracavernosally as directed prior to intercourse     Tri-Mix Standard Recipe:  - Prostaglandin E1 5.88 ug/ml  - Papaverine HCI 18mg/ ml  - Phentolamine Mesylate 0.6 mg/ ml    Tretinoin 0.1 % External Cream Use thin layer once every night to feet    Fenofibrate 160 MG Oral Tab Take 1 tablet (160 mg total) by mouth every evening.    mometasone 0.1 % External Ointment     Multiple Vitamin (MULTI-VITAMIN) Oral Tab multivitamin tablet, [RxNorm: 0]    Urea 40 % External Cream     Tadalafil 20 MG Oral Tab Take 1 tablet (20 mg total) by mouth daily as needed for Erectile Dysfunction.    atorvastatin 20 MG Oral Tab Take 1 tablet (20 mg total) by mouth daily.    clobetasol 0.05 % External Ointment After using ketoconazole for 4 weeks start this ointment twice daily  Monday-Friday    ketoconazole 2 % External Cream Apply to affected area 2 times daily for 4 weeks. Use between toes as well.    Psyllium (METAMUCIL) 0.36 g Oral Cap     triamcinolone acetonide 0.1 % External Ointment     Fluocinonide 0.05 % External Ointment     ASPIRIN 81 OR Take 81 mg by mouth daily. Stopped 7x days prior to procedure 10/7    Multiple Vitamins-Minerals (MULTIVITAL) Oral Tab Take 1 tablet by mouth daily.       Medical History:  He  has a past medical history of Allergic rhinitis, BPH (benign prostatic hyperplasia), Diabetes (HCC), Esophageal reflux, Family history of malignant neoplasm of prostate (11/30/2009), Fx wrist, Hemorrhoids, High blood pressure, High cholesterol, Other and unspecified hyperlipidemia, Peptic ulcer disease, Sleep apnea, Stented coronary artery, and Visual impairment.  Surgical History:  He  has a past surgical history that includes other surgical history (2010); other surgical history (2010); other surgical history (2010); electrocardiogram, complete (12/05/2013); colonoscopy; upper gi endoscopy,biopsy;  colonoscopy; egd; colonoscopy (N/A, 2018); tonsillectomy; cath bare metal stent (bms); and total knee replacement (Right, 2020).   Family History:  His family history includes Alcohol and Other Disorders Associated in his father; Cancer (age of onset: 72) in his mother; Heart Attack (age of onset: 48) in his father; Kidney Disease in his brother; Obesity in his father; Other in his maternal grandfather, maternal grandmother, paternal grandfather, and paternal grandmother.  Social History:  He  reports that he quit smoking about 17 years ago. His smoking use included cigars and cigarettes. He started smoking about 37 years ago. He has a 20 pack-year smoking history. He has been exposed to tobacco smoke. He has never used smokeless tobacco. He reports that he does not currently use alcohol after a past usage of about 1.0 standard drink of alcohol per week. He reports that he does not use drugs.    Tobacco:  He smoked tobacco in the past but quit greater than 12 months ago.  Social History     Tobacco Use   Smoking Status Former    Current packs/day: 0.00    Average packs/day: 1 pack/day for 20.0 years (20.0 ttl pk-yrs)    Types: Cigars, Cigarettes    Start date:     Quit date:     Years since quittin.7    Passive exposure: Past   Smokeless Tobacco Never          CAGE Alcohol Screen:   CAGE screening score of 0 on 10/2/2024, showing low risk of alcohol abuse.      Patient Care Team:  Aaron Gold MD as PCP - General (Internal Medicine)  Justino Ni DO (Physical Medicine)    Review of Systems       Objective:   Physical Exam  Constitutional:       Appearance: Normal appearance. He is well-developed. He is obese.   HENT:      Right Ear: Tympanic membrane and ear canal normal.      Left Ear: Tympanic membrane and ear canal normal.      Nose: Nose normal.      Mouth/Throat:      Pharynx: No oropharyngeal exudate or posterior oropharyngeal erythema.   Eyes:      Conjunctiva/sclera:  Conjunctivae normal.      Pupils: Pupils are equal, round, and reactive to light.   Neck:      Thyroid: No thyromegaly.      Vascular: No carotid bruit.   Cardiovascular:      Rate and Rhythm: Normal rate and regular rhythm.      Pulses: Normal pulses.      Heart sounds: Normal heart sounds. No murmur heard.  Pulmonary:      Effort: Pulmonary effort is normal.      Breath sounds: Normal breath sounds. No wheezing or rales.   Abdominal:      General: Bowel sounds are normal.      Palpations: Abdomen is soft. There is no mass.      Tenderness: There is no abdominal tenderness.      Hernia: A hernia (reducible hernia just above the umbilicus) is present.   Musculoskeletal:      Right lower leg: No edema.      Left lower leg: No edema.   Lymphadenopathy:      Cervical: No cervical adenopathy.   Skin:     General: Skin is warm and dry.      Findings: No rash.   Neurological:      General: No focal deficit present.      Mental Status: He is alert.      Cranial Nerves: No cranial nerve deficit.      Coordination: Coordination normal.   Psychiatric:         Mood and Affect: Mood normal.         Behavior: Behavior normal.         Thought Content: Thought content normal.         Judgment: Judgment normal.     Bilateral barefoot skin diabetic exam is normal, visualized feet and the appearance is normal.  Bilateral monofilament/sensation of both feet is normal.  Pulsation pedal pulse exam of both lower legs/feet is normal as well.         /76   Pulse 62   Temp 97.8 °F (36.6 °C) (Other)   Resp 20   Ht 5' 9\" (1.753 m)   Wt 244 lb (110.7 kg)   BMI 36.03 kg/m²  Estimated body mass index is 36.03 kg/m² as calculated from the following:    Height as of this encounter: 5' 9\" (1.753 m).    Weight as of this encounter: 244 lb (110.7 kg).    Medicare Hearing Assessment:   Hearing Screening    Screening Method: Questionnaire  I have a problem hearing over the telephone: No I have trouble following the conversations when two or  more people are talking at the same time: Sometimes   I have trouble understanding things on the TV: No I have to strain to understand conversations: No   I have to worry about missing the telephone ring or doorbell: No I have trouble hearing conversations in a noisy background such as a crowded room or restaurant: Sometimes   I get confused about where sounds come from: No I misunderstand some words in a sentence and need to ask people to repeat themselves: Sometimes   I especially have trouble understanding the speech of women and children: No I have trouble understanding the speaker in a large room such as at a meeting or place of [employer]: No   Many people I talk to seem to mumble (or don't speak clearly): No People get annoyed because I misunderstand what they say: No   I misunderstand what others are saying and make inappropriate responses: No I avoid social activities because I cannot hear well and fear I will reply improperly: No   Family members and friends have told me they think I may have hearing loss: No             Visual Acuity:   Right Eye Visual Acuity: Corrected Right Eye Chart Acuity: 20/25   Left Eye Visual Acuity: Corrected Left Eye Chart Acuity: 20/25   Both Eyes Visual Acuity: Corrected Both Eyes Chart Acuity: 20/25   Able To Tolerate Visual Acuity: Yes        Assessment & Plan:   Abhijeet Rosas is a 74 year old male who presents for a Medicare Assessment.     1. Encounter for annual health examination  Physical exam remarkable for obesity.  Also possible ventral or umbilical hernia.  Active issues below.  Health maintenance issues reviewed.  Advanced directives reviewed.    2. Type 2 diabetes mellitus without complication, without long-term current use of insulin (HCC)  Patient is not checking his blood sugars at home.  He is on no medication.  Check full set of labs and contact patient with results.  If everything looks good, follow-up in 6 months.  - CBC With Differential With Platelet;  Future  - Comp Metabolic Panel (14); Future  - Lipid Panel; Future  - TSH W Reflex To Free T4; Future  - Microalb/Creat Ratio, Random Urine; Future  - Hemoglobin A1C; Future    3. Essential hypertension  Elevated when checked by nurse and borderline controlled when checked by physician.  Better at home.  Encouraged to check blood pressure daily for the next week or 2 and then send in the readings.  If it is still high, then we will increase the losartan from 50 up to 100 mg a day.    4. Mixed hyperlipidemia  Check lipid profile.  Consider adjusting dose of medication if needed.    5. Obstructive sleep apnea on CPAP  Patient uses the CPAP nightly.  He gets benefit from it.    6. Elevated PSA  Chronic.  Negative biopsies in the past.  Follow-up with urology.    7. Erectile dysfunction, unspecified erectile dysfunction type  He has tried with the injection therapy.  He will be following up with urology.    8. Low testosterone  Continue with topical treatment.  Follow-up with urology.    9. Need for vaccination  Given flu shot today.  - Fluzone High Dose  65 years and older [70561]    10.  Hernia  Patient with questionable periumbilical or ventral hernia versus diastases recti.  Consider evaluation by surgeon if it gets worse or symptomatic.    The patient indicates understanding of these issues and agrees to the plan.  Reinforced healthy diet, lifestyle, and exercise.      No follow-ups on file.     Aaron Gold MD, 10/2/2024     Supplementary Documentation:   General Health:  At any time do you feel concerned for the safety/well-being of yourself and/or your children, in your home or elsewhere?: No  Have you had any immunizations at another office such as Influenza, Hepatitis B, Tetanus, or Pneumococcal?: Yes    Health Maintenance   Topic Date Due    Diabetes Care Dilated Eye Exam  03/29/2023    Annual Depression Screening  01/01/2024    Fall Risk Screening (Annual)  01/01/2024    Diabetes Care Foot Exam   03/23/2024    Diabetes Care: Microalb/Creat Ratio  03/23/2024    Diabetes Care A1C  04/17/2024    COVID-19 Vaccine (4 - 2023-24 season) 09/01/2024    Influenza Vaccine (1) 10/01/2024    Annual Physical  10/17/2024    HTN: BP Follow-Up  11/02/2024    Diabetes Care: GFR  04/01/2025    PSA  04/01/2025    Pneumococcal Vaccine: 65+ Years  Completed    Zoster Vaccines  Completed    Colorectal Cancer Screening  Discontinued

## 2024-10-02 NOTE — PATIENT INSTRUCTIONS
Check Blood pressure at home daily. In 1-2 weeks send the readings over [employer]. If it is still high, we may increase the losartan from 50 to 100 mg a day.

## 2024-10-07 ENCOUNTER — OFFICE VISIT (OUTPATIENT)
Dept: SURGERY | Facility: CLINIC | Age: 74
End: 2024-10-07

## 2024-10-07 ENCOUNTER — TELEPHONE (OUTPATIENT)
Dept: SURGERY | Facility: CLINIC | Age: 74
End: 2024-10-07

## 2024-10-07 VITALS — HEART RATE: 71 BPM | SYSTOLIC BLOOD PRESSURE: 155 MMHG | DIASTOLIC BLOOD PRESSURE: 90 MMHG

## 2024-10-07 DIAGNOSIS — N52.9 VASCULOGENIC ERECTILE DYSFUNCTION, UNSPECIFIED VASCULOGENIC ERECTILE DYSFUNCTION TYPE: ICD-10-CM

## 2024-10-07 DIAGNOSIS — R97.20 ELEVATED PSA: ICD-10-CM

## 2024-10-07 DIAGNOSIS — R35.1 BENIGN PROSTATIC HYPERPLASIA WITH NOCTURIA: Primary | ICD-10-CM

## 2024-10-07 DIAGNOSIS — N40.1 BENIGN PROSTATIC HYPERPLASIA WITH NOCTURIA: Primary | ICD-10-CM

## 2024-10-07 DIAGNOSIS — R79.89 LOW TESTOSTERONE: ICD-10-CM

## 2024-10-07 PROCEDURE — 99213 OFFICE O/P EST LOW 20 MIN: CPT | Performed by: UROLOGY

## 2024-10-07 NOTE — TELEPHONE ENCOUNTER
Patient seen in office today.  ordered Quad-mix injection. Form faxed to [facility], will send copy to scanning.

## 2024-10-07 NOTE — PROGRESS NOTES
Abhijeet Rosas is a 74 year old male.    HPI:     Chief Complaint   Patient presents with    Follow - Up     Patient is here for a follow-up.  He denies any new problems or issues.         74-year-old male in follow-up to visit April 15, 2024.  Has a history of elevated but stable serum PSA most recently at 4.77 April 1, 2024, history of multiple prostate biopsies x 5 all of which been negative.  Has low serum testosterone for which she has been on testosterone supplementation with AndroGel.  Reports improved energy levels and libido on this medication.  Most recent total testosterone April 1, 2024 normal at 368 ng/dL.  CMP was unremarkable.  CBC was normal.    Has a history of erectile dysfunction.  This is partially responsive to Trimix 0.5 mL injections.  States sometimes not sufficient for sexual relations and not rigid enough for intercourse.  HISTORY:  Past Medical History:    Allergic rhinitis    Per NG: desensitization: mold and dust mite    BPH (benign prostatic hyperplasia)    Diabetes (HCC)    Esophageal reflux    Family history of malignant neoplasm of prostate    Fx wrist    Per NG: Fx both wrists    Hemorrhoids    High blood pressure    High cholesterol    Other and unspecified hyperlipidemia    Peptic ulcer disease    Sleep apnea    USES CPAP    Stented coronary artery    Visual impairment    READERS      Past Surgical History:   Procedure Laterality Date    Cath bare metal stent (bms)      Colonoscopy      Colonoscopy      Colonoscopy N/A 4/11/2018    Procedure: COLONOSCOPY;  Surgeon: Clarence De La Torre MD;  Location: [facility] ENDOSCOPY    Egd      Electrocardiogram, complete  12/05/2013    scanned to media tab    Other surgical history  2010    Per NG: UP3    Other surgical history  2010    Per NG: T &A    Other surgical history  2010    Per NG: septo; turb. red; smr of turbs.    Tonsillectomy      Total knee replacement Right 02/27/2020    Upper gi endoscopy,biopsy        Family History   Problem  Relation Age of Onset    Alcohol and Other Disorders Associated Father     Obesity Father     Heart Attack Father 48        Per NG: massive heart attack ( cause of death)    Cancer Mother 72        Per NG: leukemia ( cause of death)    Other (Other) Maternal Grandmother     Other (Other) Maternal Grandfather     Other (Other) Paternal Grandmother     Other (Other) Paternal Grandfather     Kidney Disease Brother         Per NG: Renal disease      Social History:   Social History     Socioeconomic History    Marital status:    Tobacco Use    Smoking status: Former     Current packs/day: 0.00     Average packs/day: 1 pack/day for 20.0 years (20.0 ttl pk-yrs)     Types: Cigars, Cigarettes     Start date:      Quit date:      Years since quittin.7     Passive exposure: Past    Smokeless tobacco: Never   Vaping Use    Vaping status: Never Used   Substance and Sexual Activity    Alcohol use: Not Currently     Alcohol/week: 1.0 standard drink of alcohol     Comment: Social drinker    Drug use: Never    Sexual activity: Not Currently   Other Topics Concern    Caffeine Concern Yes     Comment: Per NG: coffee, 3 cups    Exercise Yes     Comment: twice a week    History of tanning Yes    Reaction to local anesthetic No    Pt has a pacemaker No    Pt has a defibrillator No        Medications (Active prior to today's visit):  Current Outpatient Medications   Medication Sig Dispense Refill    Mycophenolate Mofetil (CELLCEPT) 500 MG Oral Tab Take 1 tablet (500 mg total) by mouth 2 (two) times daily. 60 tablet 0    TESTOSTERONE 1.62 % Transdermal Gel APPLY 1 PUMP TO EACH UPPER ARM AND SHOULDER AREA TOPICALLY ONCE DAILY TO DELIVER A TOTAL DOSE OF 40.5 MG 75 g 0    losartan 50 MG Oral Tab Take 1 tablet (50 mg total) by mouth daily. 90 tablet 1    ammonium lactate 12 % External Lotion Apply once  daily to affected areas 400 g 0    Tri-Mix Standard (PPP) Injection Solution 0.2 mL by Intracavernosal route as needed.      Inject intracavernosally as directed prior to intercourse     Tri-Mix Standard Recipe:  - Prostaglandin E1 5.88 ug/ml  - Papaverine HCI 18mg/ ml  - Phentolamine Mesylate 0.6 mg/ ml      mometasone 0.1 % External Ointment       Multiple Vitamin (MULTI-VITAMIN) Oral Tab multivitamin tablet, [RxNorm: 0]      atorvastatin 20 MG Oral Tab Take 1 tablet (20 mg total) by mouth daily. 90 tablet 3    Psyllium (METAMUCIL) 0.36 g Oral Cap       triamcinolone acetonide 0.1 % External Ointment       ASPIRIN 81 OR Take 81 mg by mouth daily. Stopped 7x days prior to procedure 10/7      Multiple Vitamins-Minerals (MULTIVITAL) Oral Tab Take 1 tablet by mouth daily.      triamcinolone 0.1 % External Ointment Apply twice daily  to new itchy pink spots (Patient not taking: Reported on 10/7/2024) 454 g 0    tacrolimus (PROTOPIC) 0.1 % External Ointment Apply 1 Application topically 2 (two) times daily. (Patient not taking: Reported on 10/7/2024) 30 g 3    Tretinoin 0.1 % External Cream Use thin layer once every night to feet (Patient not taking: Reported on 10/7/2024) 45 g 2    Fenofibrate 160 MG Oral Tab Take 1 tablet (160 mg total) by mouth every evening. (Patient not taking: Reported on 10/7/2024) 90 tablet 3    Urea 40 % External Cream  (Patient not taking: Reported on 10/7/2024)      Tadalafil 20 MG Oral Tab Take 1 tablet (20 mg total) by mouth daily as needed for Erectile Dysfunction. (Patient not taking: Reported on 10/7/2024) 4 tablet 11    clobetasol 0.05 % External Ointment After using ketoconazole for 4 weeks start this ointment twice daily  Monday-Friday (Patient not taking: Reported on 10/7/2024) 30 g 2    ketoconazole 2 % External Cream Apply to affected area 2 times daily for 4 weeks. Use between toes as well. (Patient not taking: Reported on 10/7/2024) 60 g 3    Fluocinonide 0.05 % External Ointment  (Patient not taking: Reported on 10/7/2024)         Allergies:  No Known Allergies      ROS:       PHYSICAL EXAM:         ASSESSMENT/PLAN:   Assessment   Encounter Diagnoses   Name Primary?    Benign prostatic hyperplasia with nocturia Yes    Low testosterone     Vasculogenic erectile dysfunction, unspecified vasculogenic erectile dysfunction type     Elevated PSA        Recommend:  - Erectile dysfunction: Switch them from Trimix to quad mix.  My office will send a prescription and the patient will contact the pharmacy for payment.  - Elevated PSA: Follow-up April 2025 with a repeat PSA prior to the appointment.  - Low serum testosterone: Repeat total testosterone, CBC, CMP prior to next appointment in April.         Orders This Visit:  Orders Placed This Encounter   Procedures    PSA Diagnostic [E]       Meds This Visit:  Requested Prescriptions      No prescriptions requested or ordered in this encounter       Imaging & Referrals:  None     10/7/2024  Libertad Vasquez MD

## 2024-10-13 ENCOUNTER — LAB ENCOUNTER (OUTPATIENT)
Dept: LAB | Facility: HOSPITAL | Age: 74
End: 2024-10-13
Attending: STUDENT IN AN ORGANIZED HEALTH CARE EDUCATION/TRAINING PROGRAM
Payer: MEDICARE

## 2024-10-13 DIAGNOSIS — E11.9 TYPE 2 DIABETES MELLITUS WITHOUT COMPLICATION, WITHOUT LONG-TERM CURRENT USE OF INSULIN (HCC): ICD-10-CM

## 2024-10-13 LAB
ALBUMIN SERPL-MCNC: 4.7 G/DL (ref 3.2–4.8)
ALBUMIN/GLOB SERPL: 1.9 {RATIO} (ref 1–2)
ALP LIVER SERPL-CCNC: 62 U/L
ALT SERPL-CCNC: 20 U/L
ANION GAP SERPL CALC-SCNC: 7 MMOL/L (ref 0–18)
AST SERPL-CCNC: 12 U/L (ref ?–34)
BASOPHILS # BLD AUTO: 0.04 X10(3) UL (ref 0–0.2)
BASOPHILS NFR BLD AUTO: 0.6 %
BILIRUB SERPL-MCNC: 0.5 MG/DL (ref 0.2–1.1)
BUN BLD-MCNC: 15 MG/DL (ref 9–23)
BUN/CREAT SERPL: 15.2 (ref 10–20)
CALCIUM BLD-MCNC: 9.6 MG/DL (ref 8.7–10.4)
CHLORIDE SERPL-SCNC: 107 MMOL/L (ref 98–112)
CHOLEST SERPL-MCNC: 142 MG/DL (ref ?–200)
CO2 SERPL-SCNC: 24 MMOL/L (ref 21–32)
CREAT BLD-MCNC: 0.99 MG/DL
CREAT UR-SCNC: 98.8 MG/DL
DEPRECATED RDW RBC AUTO: 41.5 FL (ref 35.1–46.3)
EGFRCR SERPLBLD CKD-EPI 2021: 79 ML/MIN/1.73M2 (ref 60–?)
EOSINOPHIL # BLD AUTO: 0.16 X10(3) UL (ref 0–0.7)
EOSINOPHIL NFR BLD AUTO: 2.3 %
ERYTHROCYTE [DISTWIDTH] IN BLOOD BY AUTOMATED COUNT: 13.5 % (ref 11–15)
EST. AVERAGE GLUCOSE BLD GHB EST-MCNC: 232 MG/DL (ref 68–126)
FASTING PATIENT LIPID ANSWER: YES
FASTING STATUS PATIENT QL REPORTED: YES
GLOBULIN PLAS-MCNC: 2.5 G/DL (ref 2–3.5)
GLUCOSE BLD-MCNC: 245 MG/DL (ref 70–99)
HBA1C MFR BLD: 9.7 % (ref ?–5.7)
HCT VFR BLD AUTO: 42 %
HDLC SERPL-MCNC: 23 MG/DL (ref 40–59)
HGB BLD-MCNC: 15 G/DL
IMM GRANULOCYTES # BLD AUTO: 0.04 X10(3) UL (ref 0–1)
IMM GRANULOCYTES NFR BLD: 0.6 %
LDLC SERPL CALC-MCNC: 66 MG/DL (ref ?–100)
LYMPHOCYTES # BLD AUTO: 2.38 X10(3) UL (ref 1–4)
LYMPHOCYTES NFR BLD AUTO: 34.3 %
MCH RBC QN AUTO: 29.6 PG (ref 26–34)
MCHC RBC AUTO-ENTMCNC: 35.7 G/DL (ref 31–37)
MCV RBC AUTO: 83 FL
MICROALBUMIN UR-MCNC: 4.5 MG/DL
MICROALBUMIN/CREAT 24H UR-RTO: 45.5 UG/MG (ref ?–30)
MONOCYTES # BLD AUTO: 0.54 X10(3) UL (ref 0.1–1)
MONOCYTES NFR BLD AUTO: 7.8 %
NEUTROPHILS # BLD AUTO: 3.78 X10 (3) UL (ref 1.5–7.7)
NEUTROPHILS # BLD AUTO: 3.78 X10(3) UL (ref 1.5–7.7)
NEUTROPHILS NFR BLD AUTO: 54.4 %
NONHDLC SERPL-MCNC: 119 MG/DL (ref ?–130)
OSMOLALITY SERPL CALC.SUM OF ELEC: 295 MOSM/KG (ref 275–295)
PLATELET # BLD AUTO: 174 10(3)UL (ref 150–450)
POTASSIUM SERPL-SCNC: 4 MMOL/L (ref 3.5–5.1)
PROT SERPL-MCNC: 7.2 G/DL (ref 5.7–8.2)
RBC # BLD AUTO: 5.06 X10(6)UL
SODIUM SERPL-SCNC: 138 MMOL/L (ref 136–145)
TRIGL SERPL-MCNC: 331 MG/DL (ref 30–149)
TSI SER-ACNC: 2.28 MIU/ML (ref 0.55–4.78)
VLDLC SERPL CALC-MCNC: 50 MG/DL (ref 0–30)
WBC # BLD AUTO: 6.9 X10(3) UL (ref 4–11)

## 2024-10-13 PROCEDURE — 80061 LIPID PANEL: CPT

## 2024-10-13 PROCEDURE — 36415 COLL VENOUS BLD VENIPUNCTURE: CPT

## 2024-10-13 PROCEDURE — 80053 COMPREHEN METABOLIC PANEL: CPT

## 2024-10-13 PROCEDURE — 82570 ASSAY OF URINE CREATININE: CPT

## 2024-10-13 PROCEDURE — 85025 COMPLETE CBC W/AUTO DIFF WBC: CPT

## 2024-10-13 PROCEDURE — 83036 HEMOGLOBIN GLYCOSYLATED A1C: CPT

## 2024-10-13 PROCEDURE — 82043 UR ALBUMIN QUANTITATIVE: CPT

## 2024-10-13 PROCEDURE — 84443 ASSAY THYROID STIM HORMONE: CPT

## 2024-11-04 RX ORDER — MYCOPHENOLATE MOFETIL 500 MG/1
500 TABLET ORAL 2 TIMES DAILY
Qty: 60 TABLET | Refills: 0 | OUTPATIENT
Start: 2024-11-04

## 2024-11-04 NOTE — TELEPHONE ENCOUNTER
Refill request has failed the Ambulatory Medication Refill Standing Order and is routed to the primary physician to review the following:    Requested Prescriptions     Refused Prescriptions Disp Refills    MYCOPHENOLATE MOFETIL 500 MG Oral Tab [Pharmacy Med Name: Mycophenolate Mofetil 500 Mg Tab Stri] 60 tablet 0     Sig: Take 1 tablet (500 mg total) by mouth 2 (two) times daily.     Refused By: DIDI SANDERSON     Reason for Refusal: Refill not appropriate       Pt needs labs, mychart sent to pt

## 2024-11-05 ENCOUNTER — LAB ENCOUNTER (OUTPATIENT)
Dept: LAB | Age: 74
End: 2024-11-05
Attending: STUDENT IN AN ORGANIZED HEALTH CARE EDUCATION/TRAINING PROGRAM
Payer: MEDICARE

## 2024-11-05 DIAGNOSIS — G56.10 MEDIAN MONONEUROPATHY, UNSPECIFIED LATERALITY: ICD-10-CM

## 2024-11-05 LAB
ALBUMIN SERPL-MCNC: 4.8 G/DL (ref 3.2–4.8)
ALBUMIN/GLOB SERPL: 1.8 {RATIO} (ref 1–2)
ALP LIVER SERPL-CCNC: 61 U/L
ALT SERPL-CCNC: 33 U/L
ANION GAP SERPL CALC-SCNC: 11 MMOL/L (ref 0–18)
AST SERPL-CCNC: 23 U/L (ref ?–34)
BASOPHILS # BLD AUTO: 0.04 X10(3) UL (ref 0–0.2)
BASOPHILS NFR BLD AUTO: 0.4 %
BILIRUB SERPL-MCNC: 0.5 MG/DL (ref 0.2–1.1)
BUN BLD-MCNC: 14 MG/DL (ref 9–23)
BUN/CREAT SERPL: 14.1 (ref 10–20)
CALCIUM BLD-MCNC: 10.7 MG/DL (ref 8.7–10.4)
CHLORIDE SERPL-SCNC: 106 MMOL/L (ref 98–112)
CO2 SERPL-SCNC: 22 MMOL/L (ref 21–32)
CREAT BLD-MCNC: 0.99 MG/DL
DEPRECATED RDW RBC AUTO: 43 FL (ref 35.1–46.3)
EGFRCR SERPLBLD CKD-EPI 2021: 79 ML/MIN/1.73M2 (ref 60–?)
EOSINOPHIL # BLD AUTO: 0.15 X10(3) UL (ref 0–0.7)
EOSINOPHIL NFR BLD AUTO: 1.3 %
ERYTHROCYTE [DISTWIDTH] IN BLOOD BY AUTOMATED COUNT: 13.8 % (ref 11–15)
FASTING STATUS PATIENT QL REPORTED: NO
GLOBULIN PLAS-MCNC: 2.6 G/DL (ref 2–3.5)
GLUCOSE BLD-MCNC: 94 MG/DL (ref 70–99)
HCT VFR BLD AUTO: 41.5 %
HGB BLD-MCNC: 14.7 G/DL
IMM GRANULOCYTES # BLD AUTO: 0.06 X10(3) UL (ref 0–1)
IMM GRANULOCYTES NFR BLD: 0.5 %
LYMPHOCYTES # BLD AUTO: 2.68 X10(3) UL (ref 1–4)
LYMPHOCYTES NFR BLD AUTO: 24 %
MCH RBC QN AUTO: 30.2 PG (ref 26–34)
MCHC RBC AUTO-ENTMCNC: 35.4 G/DL (ref 31–37)
MCV RBC AUTO: 85.4 FL
MONOCYTES # BLD AUTO: 0.75 X10(3) UL (ref 0.1–1)
MONOCYTES NFR BLD AUTO: 6.7 %
NEUTROPHILS # BLD AUTO: 7.5 X10 (3) UL (ref 1.5–7.7)
NEUTROPHILS # BLD AUTO: 7.5 X10(3) UL (ref 1.5–7.7)
NEUTROPHILS NFR BLD AUTO: 67.1 %
OSMOLALITY SERPL CALC.SUM OF ELEC: 288 MOSM/KG (ref 275–295)
PLATELET # BLD AUTO: 176 10(3)UL (ref 150–450)
POTASSIUM SERPL-SCNC: 4.1 MMOL/L (ref 3.5–5.1)
PROT SERPL-MCNC: 7.4 G/DL (ref 5.7–8.2)
RBC # BLD AUTO: 4.86 X10(6)UL
SODIUM SERPL-SCNC: 139 MMOL/L (ref 136–145)
WBC # BLD AUTO: 11.2 X10(3) UL (ref 4–11)

## 2024-11-05 PROCEDURE — 36415 COLL VENOUS BLD VENIPUNCTURE: CPT

## 2024-11-05 PROCEDURE — 80053 COMPREHEN METABOLIC PANEL: CPT

## 2024-11-05 PROCEDURE — 85025 COMPLETE CBC W/AUTO DIFF WBC: CPT

## 2024-11-05 RX ORDER — MYCOPHENOLATE MOFETIL 500 MG/1
500 TABLET ORAL 2 TIMES DAILY
Qty: 60 TABLET | Refills: 0 | Status: SHIPPED | OUTPATIENT
Start: 2024-11-05

## 2024-11-08 DIAGNOSIS — R79.89 LOW TESTOSTERONE: ICD-10-CM

## 2024-11-08 NOTE — TELEPHONE ENCOUNTER
Per patient requesting a refill of testosterone to be sent to [facility] pharmacy, per spouse patient will be going out of town. Please advise

## 2024-11-11 NOTE — TELEPHONE ENCOUNTER
LOV 10/7/24  Assessment       Encounter Diagnoses   Name Primary?    Benign prostatic hyperplasia with nocturia Yes    Low testosterone      Vasculogenic erectile dysfunction, unspecified vasculogenic erectile dysfunction type      Elevated PSA           Recommend:  - Erectile dysfunction: Switch them from Trimix to quad mix.  My office will send a prescription and the patient will contact the pharmacy for payment.  - Elevated PSA: Follow-up April 2025 with a repeat PSA prior to the appointment.  - Low serum testosterone: Repeat total testosterone, CBC, CMP prior to next appointment in April.

## 2024-11-11 NOTE — TELEPHONE ENCOUNTER
Hi Doc, pt requesting a refill of Testosterone, please sign pended med if you approve. Tasked to KIM.

## 2024-11-13 DIAGNOSIS — R79.89 LOW TESTOSTERONE: ICD-10-CM

## 2024-11-18 NOTE — TELEPHONE ENCOUNTER
Per spouse patient has been waiting for refill for 2 weeks, asking for call back. Please call thank you.

## 2024-11-19 RX ORDER — TESTOSTERONE 20.25 MG/1.25G
1 GEL TOPICAL DAILY
Qty: 75 G | Refills: 5 | Status: SHIPPED | OUTPATIENT
Start: 2024-11-19

## 2024-11-19 RX ORDER — TESTOSTERONE 20.25 MG/1.25G
GEL TOPICAL
Qty: 75 G | Refills: 0 | OUTPATIENT
Start: 2024-11-19

## 2024-11-20 NOTE — TELEPHONE ENCOUNTER
- refill sent on 11/19/24, left VM on spouse identified VM that rx had been sent to pharmacy.    - also sent MCM

## 2024-12-06 RX ORDER — MOMETASONE FUROATE 1 MG/G
OINTMENT TOPICAL
Qty: 45 G | Refills: 1 | Status: SHIPPED | OUTPATIENT
Start: 2024-12-06

## 2024-12-10 DIAGNOSIS — E11.9 TYPE 2 DIABETES MELLITUS WITHOUT COMPLICATION, WITHOUT LONG-TERM CURRENT USE OF INSULIN (HCC): ICD-10-CM

## 2024-12-16 RX ORDER — METFORMIN HYDROCHLORIDE 500 MG/1
1000 TABLET, EXTENDED RELEASE ORAL
Qty: 180 TABLET | Refills: 0 | OUTPATIENT
Start: 2024-12-16

## 2024-12-26 ENCOUNTER — LAB ENCOUNTER (OUTPATIENT)
Dept: LAB | Age: 74
End: 2024-12-26
Attending: STUDENT IN AN ORGANIZED HEALTH CARE EDUCATION/TRAINING PROGRAM
Payer: MEDICARE

## 2024-12-26 ENCOUNTER — OFFICE VISIT (OUTPATIENT)
Dept: DERMATOLOGY CLINIC | Facility: CLINIC | Age: 74
End: 2024-12-26

## 2024-12-26 DIAGNOSIS — G56.10 MEDIAN MONONEUROPATHY, UNSPECIFIED LATERALITY: ICD-10-CM

## 2024-12-26 DIAGNOSIS — L30.9 HAND DERMATITIS: Primary | ICD-10-CM

## 2024-12-26 LAB
ALBUMIN SERPL-MCNC: 4.6 G/DL (ref 3.2–4.8)
ALBUMIN/GLOB SERPL: 2 {RATIO} (ref 1–2)
ALP LIVER SERPL-CCNC: 63 U/L
ALT SERPL-CCNC: 24 U/L
ANION GAP SERPL CALC-SCNC: 7 MMOL/L (ref 0–18)
AST SERPL-CCNC: 12 U/L (ref ?–34)
BASOPHILS # BLD AUTO: 0.05 X10(3) UL (ref 0–0.2)
BASOPHILS NFR BLD AUTO: 0.6 %
BILIRUB SERPL-MCNC: 0.4 MG/DL (ref 0.2–1.1)
BUN BLD-MCNC: 11 MG/DL (ref 9–23)
BUN/CREAT SERPL: 12 (ref 10–20)
CALCIUM BLD-MCNC: 9.7 MG/DL (ref 8.7–10.4)
CHLORIDE SERPL-SCNC: 105 MMOL/L (ref 98–112)
CO2 SERPL-SCNC: 25 MMOL/L (ref 21–32)
CREAT BLD-MCNC: 0.92 MG/DL
DEPRECATED RDW RBC AUTO: 44.4 FL (ref 35.1–46.3)
EGFRCR SERPLBLD CKD-EPI 2021: 86 ML/MIN/1.73M2 (ref 60–?)
EOSINOPHIL # BLD AUTO: 0.16 X10(3) UL (ref 0–0.7)
EOSINOPHIL NFR BLD AUTO: 1.8 %
ERYTHROCYTE [DISTWIDTH] IN BLOOD BY AUTOMATED COUNT: 13.9 % (ref 11–15)
FASTING STATUS PATIENT QL REPORTED: NO
GLOBULIN PLAS-MCNC: 2.3 G/DL (ref 2–3.5)
GLUCOSE BLD-MCNC: 248 MG/DL (ref 70–99)
HCT VFR BLD AUTO: 43.6 %
HGB BLD-MCNC: 14.6 G/DL
IMM GRANULOCYTES # BLD AUTO: 0.06 X10(3) UL (ref 0–1)
IMM GRANULOCYTES NFR BLD: 0.7 %
LYMPHOCYTES # BLD AUTO: 2.66 X10(3) UL (ref 1–4)
LYMPHOCYTES NFR BLD AUTO: 30.6 %
MCH RBC QN AUTO: 29.3 PG (ref 26–34)
MCHC RBC AUTO-ENTMCNC: 33.5 G/DL (ref 31–37)
MCV RBC AUTO: 87.4 FL
MONOCYTES # BLD AUTO: 0.73 X10(3) UL (ref 0.1–1)
MONOCYTES NFR BLD AUTO: 8.4 %
NEUTROPHILS # BLD AUTO: 5.03 X10 (3) UL (ref 1.5–7.7)
NEUTROPHILS # BLD AUTO: 5.03 X10(3) UL (ref 1.5–7.7)
NEUTROPHILS NFR BLD AUTO: 57.9 %
OSMOLALITY SERPL CALC.SUM OF ELEC: 292 MOSM/KG (ref 275–295)
PLATELET # BLD AUTO: 192 10(3)UL (ref 150–450)
POTASSIUM SERPL-SCNC: 4.2 MMOL/L (ref 3.5–5.1)
PROT SERPL-MCNC: 6.9 G/DL (ref 5.7–8.2)
RBC # BLD AUTO: 4.99 X10(6)UL
SODIUM SERPL-SCNC: 137 MMOL/L (ref 136–145)
WBC # BLD AUTO: 8.7 X10(3) UL (ref 4–11)

## 2024-12-26 PROCEDURE — 85025 COMPLETE CBC W/AUTO DIFF WBC: CPT

## 2024-12-26 PROCEDURE — 36415 COLL VENOUS BLD VENIPUNCTURE: CPT

## 2024-12-26 PROCEDURE — 80053 COMPREHEN METABOLIC PANEL: CPT

## 2024-12-26 PROCEDURE — 99214 OFFICE O/P EST MOD 30 MIN: CPT | Performed by: STUDENT IN AN ORGANIZED HEALTH CARE EDUCATION/TRAINING PROGRAM

## 2024-12-26 RX ORDER — MYCOPHENOLATE MOFETIL 500 MG/1
1000 TABLET ORAL 2 TIMES DAILY
Qty: 120 TABLET | Refills: 2 | Status: SHIPPED | OUTPATIENT
Start: 2024-12-26

## 2024-12-26 NOTE — PROGRESS NOTES
EMERGENCY DEPARTMENT ENCOUNTER      Room Number: 02/02  History is provided by the patient, no translation services needed    HPI:    Chief complaint: Generalized weakness, intermittent dizziness, feeling generally unwell for about 1 month but worsening over the last week.     Location: Generalized    Quality/Severity: Mild to moderate in nature    Timing/Duration: Onset was gradual approximately 1 week ago per patient    Modifying Factors: She states her symptoms have not affected her activity of daily living    Associated Symptoms: Cough, increased frequency of urination    Narrative: Pt is a 70 y.o. female who is alert, oriented, and in no acute distress.  She presents complaining of generalized weakness, intermittent dizziness, and feeling unwell for about 1 month but worsening over the last week.  She states she saw her primary care provider about a month ago for the same symptoms.  She reports at that time she was diagnosed with pneumonia but feels that she recovered.  Denies any nausea, vomiting, chest pain, shortness of breath, seizures, falls.  She states she lives at home with her brother.  She ambulates using a walker.     PMD: Hany Oviedo MD    REVIEW OF SYSTEMS  Review of Systems   Constitutional: Negative for activity change, appetite change, chills, diaphoresis, fatigue, fever and unexpected weight change.   HENT: Negative for congestion, facial swelling, postnasal drip, rhinorrhea, sinus pressure, sinus pain, sneezing and sore throat.    Eyes: Negative for itching and visual disturbance.   Respiratory: Positive for cough and shortness of breath. Negative for chest tightness.    Cardiovascular: Positive for palpitations. Negative for chest pain and leg swelling.   Gastrointestinal: Negative for abdominal distention, abdominal pain, blood in stool, constipation, diarrhea, nausea and vomiting.   Genitourinary: Positive for frequency. Negative for difficulty urinating, dysuria, flank pain and  October 1, 2024    Established patient     Referred by:   No referring provider defined for this encounter.     CHIEF COMPLAINT: FBSE    HISTORY OF PRESENT ILLNESS: .    Porokeratosis   Location: R lower leg    Signs and symptoms: Pt reports some improvement and its almost healed but came back again  Current treatment: tacrolimus (PROTOPIC) 0.1 % Ointment and triamcinolone 0.1 % Ointment   Past treatments: tacrolimus (PROTOPIC) 0.1 % Ointment and triamcinolone 0.1 % Ointment     2. Hyperkeratosis   Location: feet    Signs and symptoms: Pt reports some improvement and its almost healed but came back again  Past treatments: tacrolimus (PROTOPIC) 0.1 % Ointment and triamcinolone 0.1 % Ointment     DERM HISTORY:  History of skin cancer: No    FAMILY HISTORY:  History of melanoma: No    PAST MEDICAL HISTORY:  Past Medical History:    Allergic rhinitis    Per NG: desensitization: mold and dust mite    BPH (benign prostatic hyperplasia)    Diabetes (HCC)    Esophageal reflux    Family history of malignant neoplasm of prostate    Fx wrist    Per NG: Fx both wrists    Hemorrhoids    High blood pressure    High cholesterol    Other and unspecified hyperlipidemia    Peptic ulcer disease    Sleep apnea    USES CPAP    Stented coronary artery    Visual impairment    READERS       REVIEW OF SYSTEMS:  Constitutional: Denies fever, chills, unintentional weight loss.   Skin as per HPI    Medications  Current Outpatient Medications   Medication Sig Dispense Refill    MOMETASONE 0.1 % External Ointment USE TWICE DAILY ON HANDS AND FEET MONDAY THROUGH FRIDAY. ON FEET, MIX WITH UREA. 45 g 1    Testosterone 1.62 % Transdermal Gel Apply 1 Act topically daily. APPLY 1 PUMP TO EACH UPPER ARM AND SHOULDER AREA TOPICALLY ONCE DAILY TO DELIVER A TOTAL DOSE OF 40.5 MG 75 g 5    Mycophenolate Mofetil (CELLCEPT) 500 MG Oral Tab Take 1 tablet (500 mg total) by mouth 2 (two) times daily. 60 tablet 0    metFORMIN HCl 1000 MG Oral Tab Take 1 tablet  (1,000 mg total) by mouth 2 (two) times daily with meals. 180 tablet 1    triamcinolone 0.1 % External Ointment Apply twice daily  to new itchy pink spots (Patient not taking: Reported on 10/7/2024) 454 g 0    tacrolimus (PROTOPIC) 0.1 % External Ointment Apply 1 Application topically 2 (two) times daily. (Patient not taking: Reported on 10/7/2024) 30 g 3    losartan 50 MG Oral Tab Take 1 tablet (50 mg total) by mouth daily. 90 tablet 1    ammonium lactate 12 % External Lotion Apply once  daily to affected areas 400 g 0    Tri-Mix Standard (PPP) Injection Solution 0.2 mL by Intracavernosal route as needed.     Inject intracavernosally as directed prior to intercourse     Tri-Mix Standard Recipe:  - Prostaglandin E1 5.88 ug/ml  - Papaverine HCI 18mg/ ml  - Phentolamine Mesylate 0.6 mg/ ml      Tretinoin 0.1 % External Cream Use thin layer once every night to feet (Patient not taking: Reported on 10/7/2024) 45 g 2    Fenofibrate 160 MG Oral Tab Take 1 tablet (160 mg total) by mouth every evening. (Patient not taking: Reported on 10/7/2024) 90 tablet 3    Multiple Vitamin (MULTI-VITAMIN) Oral Tab multivitamin tablet, [RxNorm: 0]      Urea 40 % External Cream  (Patient not taking: Reported on 10/7/2024)      Tadalafil 20 MG Oral Tab Take 1 tablet (20 mg total) by mouth daily as needed for Erectile Dysfunction. (Patient not taking: Reported on 10/7/2024) 4 tablet 11    atorvastatin 20 MG Oral Tab Take 1 tablet (20 mg total) by mouth daily. 90 tablet 3    clobetasol 0.05 % External Ointment After using ketoconazole for 4 weeks start this ointment twice daily  Monday-Friday (Patient not taking: Reported on 10/7/2024) 30 g 2    ketoconazole 2 % External Cream Apply to affected area 2 times daily for 4 weeks. Use between toes as well. (Patient not taking: Reported on 10/7/2024) 60 g 3    Psyllium (METAMUCIL) 0.36 g Oral Cap       triamcinolone acetonide 0.1 % External Ointment       Fluocinonide 0.05 % External Ointment   hematuria.   Musculoskeletal: Negative for arthralgias and myalgias.   Skin: Negative for pallor and rash.   Allergic/Immunologic: Negative.    Neurological: Positive for dizziness, weakness and light-headedness. Negative for tremors, syncope, numbness and headaches.        Patient has a history of seizures.  But reports she has not had a seizure in quite some time.  She is compliant with Keppra for her report   Hematological: Negative.    Psychiatric/Behavioral: Negative.          PAST MEDICAL HISTORY  Active Ambulatory Problems     Diagnosis Date Noted   • MGUS (monoclonal gammopathy of unknown significance) 03/25/2013   • Normocytic anemia 02/21/2013   • Hiatal hernia 04/17/2017   • Colon polyps 04/17/2017   • Chronic anticoagulation 01/23/2019   • Esophageal dysphagia 06/02/2020   • Severe malnutrition ([facility]) 02/20/2021   • Longstanding persistent atrial fibrillation ([facility]) 02/25/2021   • Sepsis associated hypotension ([facility]) 04/12/2021   • Hydronephrosis due to obstruction of ureter 04/12/2021   • Acute metabolic encephalopathy 04/17/2021   • Valvular heart disease 04/17/2021   • Aspiration pneumonia of right lower lobe ([facility]) 12/02/2021     Resolved Ambulatory Problems     Diagnosis Date Noted   • Abnormal laboratory test result 01/16/2017   • Gastroesophageal reflux disease 01/16/2017   • Leukopenia 07/15/2015   • Neutropenia ([facility]) 02/21/2013   • Internal hemorrhoids 04/17/2017   • Paroxysmal atrial fibrillation ([facility]) 01/23/2019   • Acute diastolic CHF (congestive heart failure) ([facility]) 10/07/2020   • Hypothermia 02/17/2021   • Dysarthria 02/25/2021   • Multiple wounds of skin 04/17/2021   • Anasarca 04/17/2021   • Acute kidney insufficiency 04/17/2021   • Grand mal status epilepticus ([facility]) 04/17/2021   • Focal motor seizure ([facility]) 04/18/2021   • Nichols coma scale total score 3-8 ([facility]) 04/18/2021   • Pyelonephritis 04/30/2021   • Altered mental status 05/03/2021   • Hypothermia 05/10/2021   • Encounter for  (Patient not taking: Reported on 10/7/2024)      ASPIRIN 81 OR Take 81 mg by mouth daily. Stopped 7x days prior to procedure 10/7      Multiple Vitamins-Minerals (MULTIVITAL) Oral Tab Take 1 tablet by mouth daily.         PHYSICAL EXAM:  Patient declined chaperone   General: awake, alert, no acute distress  Skin: Skin exam was performed today including the following: head and face, scalp, neck, chest (including breasts and axillae), abdomen, back, bilateral upper extremities, bilateral lower extremities, hands, feet, digits, nails. Pertinent findings include:   - Hands clear  - R foot with thick scaly plaques      ASSESSMENT & PLAN:  Pathophysiology of diagnoses discussed with patient.  Therapeutic options reviewed. Risks, benefits, and alternatives discussed with patient. Instructions reviewed at length.    #Hand/Foot dermatitis, improved but flaring recently  #Medication monitoring  - increase cellcept to 1000mg twice daily  Risks, benefits, and alternatives discussed with patient.  - Labs today then repeat every 3 months    #Multiple actinic keratoses  - Will plan to reevaluate for recurrence at next visit    Return to clinic: 3 months or sooner if something concerning arises     Rafat French MD   rehabilitation 05/13/2021     Past Medical History:   Diagnosis Date   • Anxiety    • Arthritis    • Chronic diastolic (congestive) heart failure (HCC)    • Colon polyp    • Dysphagia    • GERD (gastroesophageal reflux disease)    • Hypertension    • Hypothyroidism    • Monoclonal gammopathy 3/25/2013   • Nonrheumatic mitral valve regurgitation    • Persistent atrial fibrillation (HCC)    • Reflux gastritis    • Seizure (HCC)    • Tricuspid regurgitation    • Type 2 diabetes mellitus (HCC)        PAST SURGICAL HISTORY  Past Surgical History:   Procedure Laterality Date   • COLONOSCOPY N/A 3/29/2017    Procedure: COLONOSCOPY; POLYPECTOMY;  Surgeon: Brooke Garrido MD;  Location: [facility] OR;  Service:    • COLONOSCOPY N/A 8/3/2020    Procedure: COLONOSCOPY with polypectomy;  Surgeon: Rui Shi MD;  Location: [facility] OR;  Service: Gastroenterology;  Laterality: N/A;  ascending polyp  transverse polyp  rectal polyp   • CYST REMOVAL     • CYSTOSCOPY W/ URETERAL STENT PLACEMENT Right 4/13/2021    Procedure: CYSTOSCOPY URETERAL CATHETER/STENT INSERTION;  Surgeon: Onesimo Fuller MD;  Location: [facility] OR;  Service: Urology;  Laterality: Right;   • CYSTOSCOPY W/ URETERAL STENT PLACEMENT Right 4/28/2021    Procedure: CYSTOSCOPY URETERAL STENT REMOVAL;  Surgeon: Onesimo Fuller MD;  Location: [facility] OR;  Service: Urology;  Laterality: Right;  CYSTOSCOPY URETERAL STENT REMOVAL   • ENDOSCOPY N/A 3/29/2017    Procedure: ESOPHAGOGASTRODUODENOSCOPY WITH DILITATION;  Surgeon: Brooke Garrido MD;  Location: [facility] OR;  Service:    • ENDOSCOPY N/A 8/3/2020    Procedure: ESOPHAGOGASTRODUODENOSCOPY with biopsies;  Surgeon: Rui Shi MD;  Location: [facility] OR;  Service: Gastroenterology;  Laterality: N/A;  esophagitis  gastritis   • HERNIA REPAIR     • HYSTERECTOMY         FAMILY HISTORY  Family History   Problem Relation Age of Onset   • Colon cancer Mother    • Colon cancer Other    • Lung  cancer Father    • Breast cancer Maternal Aunt        SOCIAL HISTORY  Social History     Socioeconomic History   • Marital status: Single   Tobacco Use   • Smoking status: Never Smoker   • Smokeless tobacco: Never Used   • Tobacco comment: daily caffiene   Vaping Use   • Vaping Use: Never used   Substance and Sexual Activity   • Alcohol use: No   • Drug use: No   • Sexual activity: Defer       ALLERGIES  Patient has no known allergies.      Current Facility-Administered Medications:   •  sodium chloride 0.9 % flush 10 mL, 10 mL, Intravenous, PRN, Jevon Bailey MD    Current Outpatient Medications:   •  amLODIPine (NORVASC) 5 MG tablet, Take 1 tablet by mouth Daily., Disp: , Rfl:   •  aspirin 81 MG chewable tablet, Chew 1 tablet Daily., Disp:  , Rfl:   •  lactobacillus acidophilus (RISAQUAD) capsule capsule, Take 1 capsule by mouth 2 (Two) Times a Day., Disp: , Rfl:   •  levETIRAcetam (Keppra) 1000 MG tablet, Take 1 tablet by mouth 2 (Two) Times a Day., Disp: , Rfl:   •  levothyroxine (SYNTHROID, LEVOTHROID) 75 MCG tablet, Take 1 tablet by mouth Daily., Disp: , Rfl:   •  LORazepam (ATIVAN) 0.5 MG tablet, Take 0.5 mg by mouth Every 12 (Twelve) Hours As Needed for Anxiety., Disp: , Rfl:   •  nitroglycerin (NITROSTAT) 0.4 MG SL tablet, Place 0.4 mg under the tongue Every 5 (Five) Minutes As Needed for Chest Pain. Take no more than 3 doses in 15 minutes., Disp: , Rfl:   •  pantoprazole (PROTONIX) 40 MG EC tablet, Take 40 mg by mouth Daily., Disp: , Rfl:   •  polyethylene glycol (MIRALAX) 17 g packet, Take 17 g by mouth Daily., Disp: , Rfl:   •  rivaroxaban (XARELTO) 20 MG tablet, Take 1 tablet by mouth Daily., Disp: 30 tablet, Rfl: 5  •  sennosides-docusate (PERICOLACE) 8.6-50 MG per tablet, Take 1 tablet by mouth Every Night., Disp: , Rfl:     PHYSICAL EXAM  ED Triage Vitals [02/20/22 1743]   Temp Heart Rate Resp BP SpO2   98.4 °F (36.9 °C) 73 18 94/73 93 %      Temp src Heart Rate Source Patient Position BP  Location FiO2 (%)   Oral Monitor -- -- --       Physical Exam  Vitals and nursing note reviewed.   Constitutional:       General: She is not in acute distress.     Appearance: She is not ill-appearing, toxic-appearing or diaphoretic.      Comments: Thin frail appearing   HENT:      Head: Normocephalic and atraumatic.      Right Ear: Ear canal and external ear normal. There is no impacted cerumen.      Left Ear: Ear canal and external ear normal. There is no impacted cerumen.      Ears:      Comments: Cerumen noted both ears     Nose: Nose normal. No congestion or rhinorrhea.      Mouth/Throat:      Mouth: Mucous membranes are moist.      Pharynx: Oropharynx is clear. No oropharyngeal exudate or posterior oropharyngeal erythema.      Comments: Currently drinking water  Eyes:      General: No scleral icterus.     Extraocular Movements: Extraocular movements intact.      Conjunctiva/sclera: Conjunctivae normal.      Pupils: Pupils are equal, round, and reactive to light.   Cardiovascular:      Rate and Rhythm: Normal rate. Rhythm irregular.      Pulses: Normal pulses.   Pulmonary:      Effort: Pulmonary effort is normal. No tachypnea, accessory muscle usage, prolonged expiration or respiratory distress.      Breath sounds: No decreased air movement. Examination of the right-lower field reveals rales. Rales present.   Abdominal:      General: Abdomen is flat. Bowel sounds are normal. There is no distension.      Tenderness: There is no abdominal tenderness. There is no right CVA tenderness or left CVA tenderness.   Musculoskeletal:         General: Normal range of motion.      Cervical back: Normal range of motion and neck supple. No rigidity or tenderness.      Right lower leg: No edema.      Left lower leg: No edema.   Lymphadenopathy:      Cervical: No cervical adenopathy.   Skin:     General: Skin is warm and dry.      Capillary Refill: Capillary refill takes less than 2 seconds.   Neurological:      General: No  focal deficit present.      Mental Status: She is alert and oriented to person, place, and time.   Psychiatric:         Mood and Affect: Mood normal.         Behavior: Behavior normal.           LAB RESULTS  Lab Results (last 24 hours)     Procedure Component Value Units Date/Time    CBC & Differential [[id number]]  (Abnormal) Collected: 02/20/22 1822    Specimen: Blood Updated: 02/20/22 1917    Narrative:      The following orders were created for panel order CBC & Differential.  Procedure                               Abnormality         Status                     ---------                               -----------         ------                     CBC Auto Differential[[id number]]        Abnormal            Final result               Scan Slide[[id number]]                                       Final result                 Please view results for these tests on the individual orders.    Comprehensive Metabolic Panel [[id number]]  (Abnormal) Collected: 02/20/22 1822    Specimen: Blood Updated: 02/20/22 1905     Glucose 104 mg/dL      BUN 26 mg/dL      Creatinine 1.15 mg/dL      Sodium 138 mmol/L      Potassium 3.7 mmol/L      Chloride 102 mmol/L      CO2 20.6 mmol/L      Calcium 10.5 mg/dL      Total Protein 7.7 g/dL      Albumin 3.90 g/dL      ALT (SGPT) 46 U/L      AST (SGOT) 48 U/L      Alkaline Phosphatase 217 U/L      Total Bilirubin 0.6 mg/dL      eGFR   Amer 56 mL/min/1.73      Globulin 3.8 gm/dL      A/G Ratio 1.0 g/dL      BUN/Creatinine Ratio 22.6     Anion Gap 15.4 mmol/L     Narrative:      GFR Normal >60  Chronic Kidney Disease <60  Kidney Failure <15      Troponin [[id number]]  (Normal) Collected: 02/20/22 1822    Specimen: Blood Updated: 02/20/22 1907     Troponin T <0.010 ng/mL     Narrative:      Troponin T Reference Range:  <= 0.03 ng/mL-   Negative for AMI  >0.03 ng/mL-     Abnormal for myocardial necrosis.  Clinicians would have to utilize clinical acumen, EKG, Troponin and serial changes to  determine if it is an Acute Myocardial Infarction or myocardial injury due to an underlying chronic condition.       Results may be falsely decreased if patient taking Biotin.      CBC Auto Differential [[id number]]  (Abnormal) Collected: 02/20/22 1822    Specimen: Blood Updated: 02/20/22 1917     WBC 2.41 10*3/mm3      RBC 4.03 10*6/mm3      Hemoglobin 10.4 g/dL      Hematocrit 33.6 %      MCV 83.4 fL      MCH 25.8 pg      MCHC 31.0 g/dL      RDW 19.3 %      RDW-SD 58.4 fl      Platelets 155 10*3/mm3      Neutrophil % 63.1 %      Lymphocyte % 25.3 %      Monocyte % 10.4 %      Eosinophil % 0.8 %      Basophil % 0.4 %      Immature Grans % 0.0 %      Neutrophils, Absolute 1.52 10*3/mm3      Lymphocytes, Absolute 0.61 10*3/mm3      Monocytes, Absolute 0.25 10*3/mm3      Eosinophils, Absolute 0.02 10*3/mm3      Basophils, Absolute 0.01 10*3/mm3      Immature Grans, Absolute 0.00 10*3/mm3      nRBC 0.8 /100 WBC     Narrative:      Flags resolved upon repeat analysis      Scan Slide [[id number]] Collected: 02/20/22 1822    Specimen: Blood Updated: 02/20/22 1917     Dacrocytes Slight/1+     Elliptocytes Slight/1+     Hypochromia Slight/1+     Schistocytes Slight/1+     WBC Morphology Normal     Platelet Estimate Adequate    Urinalysis With Culture If Indicated - Urine, Catheter [[id number]]  (Normal) Collected: 02/20/22 1826    Specimen: Urine, Catheter Updated: 02/20/22 1842     Color, UA Yellow     Appearance, UA Clear     pH, UA <=5.0     Specific Gravity, UA 1.020     Glucose, UA Negative     Ketones, UA Negative     Bilirubin, UA Negative     Blood, UA Negative     Protein, UA Negative     Leuk Esterase, UA Negative     Nitrite, UA Negative     Urobilinogen, UA 0.2 E.U./dL    Narrative:      Urine microscopic not indicated.    POC Glucose Once [[id number]]  (Normal) Collected: 02/20/22 1830    Specimen: Blood Updated: 02/20/22 1839     Glucose 101 mg/dL      Comment: Meter: [id number] : [id number] Iris HASSAN  Tech.               I ordered the above labs and reviewed the results    RADIOLOGY  XR Chest 1 View    Result Date: 2/20/2022  CR Chest 1 Vw INDICATION: Generalized weakness for the past several days COMPARISON:  12/5/2021 FINDINGS: Portable AP view(s) of the chest.  Cardiomegaly is noted and has increased slightly since the previous exam. Vascular markings are normal. Patchy areas of consolidation are seen in both mid to upper lung fields. Bilateral upper to midlung field infiltrates have not changed significantly. No effusions are noted.     The cardiac silhouette is slightly larger since the previous examination. No other changes are seen. Bilateral mid to upper lung field infiltrates are unchanged. Signer Name: Thomas Farah MD  Signed: 2/20/2022 7:26 PM  Workstation Name: [email or web address]  [facility]      I ordered the above radiologic testing and reviewed the results    PROCEDURES  ECG 12 Lead      Date/Time: 2/20/2022 7:54 PM  Performed by: Mari Bennett APRN  Authorized by: Jevon Bailey MD   Interpreted by physician (james)  Comparison: compared with previous ECG from 11/30/2022  Comparison to previous ECG: No change per MD  Rhythm: atrial fibrillation  Rate: normal  ST Segments: ST segments normal  Clinical impression: abnormal ECG  Comments: EKG shows atrial fibrillation with a controlled rate of 74.  Leftward axis.  Interpreted by Dr. Bailey.            PROGRESS AND CONSULTS  ED Course as of 02/20/22 2022   Sun Feb 20, 2022   1835 Patient reports her dizziness has been ongoing for approximately 1 month.  She states that she has seen both her primary care provider and cardiologist for this.  She reports a head CT was done did not reveal any abnormalities.  She states as far she is aware there has been no etiology discovered.  She is unsure if she had dizziness before she had Covid.  States the dizziness is common when she changes positions. [VT]   1836 Glucose is 101. [VT]    1845 Patient had Covid in December 2021. [VT]   1846 UA does not reveal any abnormalities [VT]   1909 BUN and creatinine are elevated from previous labs. [VT]   1910 12-9 her BUN was 19, and creatinine 0.94.  Her liver enzymes have been elevated on previous labs and these levels are actually improved improved [VT]   1910 CO2 was noted to be 20.6. [VT]   1930 White blood cells 2.41. [VT]   1937 IMPRESSION:  The cardiac silhouette is slightly larger since the previous examination. No other changes are seen. Bilateral mid to upper lung field infiltrates are unchanged.     Signer Name: Thomas Farah MD [VT]   1939 Discussed findings with Dr. Bailey.  Will give patient gentle IV hydration. [VT]   1942 Discussed all lab findings with patient.  Give gentle IV hydration and reassess. [VT]   1944 Patient did confirm that she was Covid positive in December.  Patient reports she would like to be discharged home and not admitted to the hospital. [VT]   2019 Spoke with patient and she reports she does feel better after IV fluids.  She is to follow-up with her primary care provider this week.  She does feel comfortable going home. [VT]      ED Course User Index  [VT] Mari Bennett, EDEL           MEDICAL DECISION MAKING    MDM     My differential diagnosis for weakness/dizziness included but is not limited to:  Labyrinthitis, vertigo, concussion, intracranial hemorrhage, stroke, migraine headache, cardiac arrhythmias, acute MI, hypotension, hypertension, hypoxia, inner ear and middle ear infections, anemia, electrolyte abnormalities, dehydration, hyperventilation and anxiety attacks..    DIAGNOSIS  Final diagnoses:   Weakness generalized   Dizziness, nonspecific   Leukopenia, unspecified type   Longstanding persistent atrial fibrillation (HCC)   Dehydration       Latest Documented Vital Signs:  As of 20:22 EST  BP- 104/83 HR- 67 Temp- 98.4 °F (36.9 °C) (Oral) O2 sat- 97%    DISPOSITION      Discussed pertinent findings  with the patient/family.  Patient/Family voiced understanding of need to follow-up for recheck and further testing as needed.  Return to the Emergency Department warnings were given.         Medication List      No changes were made to your prescriptions during this visit.              Follow-up Information     Hany Oviedo MD. Call in 1 day.    Specialty: Family Medicine  Contact information:  [address] [place] [id number]  [phone]                           Dictated utilizing Dragon dictation     Mari Bennett, EDEL  02/20/22 2022

## 2024-12-27 ENCOUNTER — TELEPHONE (OUTPATIENT)
Dept: DERMATOLOGY CLINIC | Facility: CLINIC | Age: 74
End: 2024-12-27

## 2024-12-27 NOTE — TELEPHONE ENCOUNTER
Received via fax 12-27-24.  Medicare New prescription order for Immunosuppressive medication.    Current Outpatient Medications   Medication Sig Dispense Refill    Mycophenolate Mofetil (CELLCEPT) 500 MG Oral Tab Take 2 tablets (1,000 mg total) by mouth 2 (two) times daily. 120 tablet 2

## 2025-01-16 RX ORDER — TRIAMCINOLONE ACETONIDE 1 MG/G
OINTMENT TOPICAL
Qty: 454 G | Refills: 0 | Status: SHIPPED | OUTPATIENT
Start: 2025-01-16

## 2025-01-16 NOTE — TELEPHONE ENCOUNTER
Refill Request for medication(s):     Last Office Visit: 12/26/24    Last Refill: 7/8/24    Pharmacy, Dosage verified: yes    Condition Update (if applicable):     Rx pended and sent to provider for approval, please advise. Thank You!

## 2025-02-03 ENCOUNTER — HOSPITAL ENCOUNTER (OUTPATIENT)
Age: 75
Discharge: HOME | End: 2025-02-03
Payer: MEDICARE

## 2025-02-03 ENCOUNTER — APPOINTMENT (OUTPATIENT)
Dept: GENERAL RADIOLOGY | Age: 75
End: 2025-02-03
Attending: PHYSICIAN ASSISTANT
Payer: MEDICARE

## 2025-02-03 VITALS
HEART RATE: 67 BPM | RESPIRATION RATE: 16 BRPM | SYSTOLIC BLOOD PRESSURE: 149 MMHG | DIASTOLIC BLOOD PRESSURE: 79 MMHG | TEMPERATURE: 98 F | OXYGEN SATURATION: 96 %

## 2025-02-03 DIAGNOSIS — S62.327A CLOSED DISPLACED FRACTURE OF SHAFT OF FIFTH METACARPAL BONE OF LEFT HAND, INITIAL ENCOUNTER: Primary | ICD-10-CM

## 2025-02-03 DIAGNOSIS — S69.90XA HAND INJURY: ICD-10-CM

## 2025-02-03 DIAGNOSIS — R03.0 ELEVATED BLOOD PRESSURE READING: ICD-10-CM

## 2025-02-03 PROCEDURE — 73130 X-RAY EXAM OF HAND: CPT | Performed by: PHYSICIAN ASSISTANT

## 2025-02-03 PROCEDURE — 29125 APPL SHORT ARM SPLINT STATIC: CPT | Performed by: PHYSICIAN ASSISTANT

## 2025-02-03 PROCEDURE — 99214 OFFICE O/P EST MOD 30 MIN: CPT | Performed by: PHYSICIAN ASSISTANT

## 2025-02-03 NOTE — ED PROVIDER NOTES
Patient Seen in: [facility]    History     Chief Complaint   Patient presents with    Hand Injury     Stated Complaint: Arm or Hand Injury    HPI    HPI: Abhijeet Rosas is a 74 year old male who presents with chief complaint of left hand pain.  Onset 1 week ago.  Patient states that he accidentally hit his left hand against the top of a cabinet while attempting to catch a vase, injuring his left hand.  Patient reports associated swelling and ecchymosis.  Patient denies other injury, head/neck injury or pain, open wound, decreased range of motion, erythema, weakness, paraesthesias.      Past Medical History:    Allergic rhinitis    Per NG: desensitization: mold and dust mite    BPH (benign prostatic hyperplasia)    Diabetes (HCC)    Esophageal reflux    Family history of malignant neoplasm of prostate    Fx wrist    Per NG: Fx both wrists    Hemorrhoids    High blood pressure    High cholesterol    Other and unspecified hyperlipidemia    Peptic ulcer disease    Sleep apnea    USES CPAP    Stented coronary artery    Visual impairment    READERS       Past Surgical History:   Procedure Laterality Date    Cath bare metal stent (bms)      Colonoscopy      Colonoscopy      Colonoscopy N/A 4/11/2018    Procedure: COLONOSCOPY;  Surgeon: Clarence De La Torre MD;  Location: [facility] ENDOSCOPY    Egd      Electrocardiogram, complete  12/05/2013    scanned to media tab    Other surgical history  2010    Per NG: UP3    Other surgical history  2010    Per NG: T &A    Other surgical history  2010    Per NG: septo; turb. red; smr of turbs.    Tonsillectomy      Total knee replacement Right 02/27/2020    Upper gi endoscopy,biopsy              Family History   Problem Relation Age of Onset    Alcohol and Other Disorders Associated Father     Obesity Father     Heart Attack Father 48        Per NG: massive heart attack ( cause of death)    Cancer Mother 72        Per NG: leukemia ( cause of death)    Other (Other) Maternal  Grandmother     Other (Other) Maternal Grandfather     Other (Other) Paternal Grandmother     Other (Other) Paternal Grandfather     Kidney Disease Brother         Per NG: Renal disease       Social History     Socioeconomic History    Marital status:    Tobacco Use    Smoking status: Former     Current packs/day: 0.00     Average packs/day: 1 pack/day for 20.0 years (20.0 ttl pk-yrs)     Types: Cigars, Cigarettes     Start date:      Quit date:      Years since quittin.1     Passive exposure: Past    Smokeless tobacco: Never   Vaping Use    Vaping status: Never Used   Substance and Sexual Activity    Alcohol use: Not Currently     Alcohol/week: 1.0 standard drink of alcohol     Comment: Social drinker    Drug use: Never    Sexual activity: Not Currently   Other Topics Concern    Caffeine Concern Yes     Comment: Per NG: coffee, 3 cups    Exercise Yes     Comment: twice a week    History of tanning Yes    Reaction to local anesthetic No    Pt has a pacemaker No    Pt has a defibrillator No       Review of Systems    Positive for stated complaint: Arm or Hand Injury  Other systems are as noted in HPI.  Constitutional and vital signs reviewed.      All other systems reviewed and negative except as noted above.    PSFH elements reviewed from today and agreed except as otherwise stated in HPI.    Physical Exam     ED Triage Vitals [25 1325]   /79   Pulse 67   Resp 16   Temp 98.2 °F (36.8 °C)   Temp src Oral   SpO2 96 %   O2 Device None (Room air)       Current:/79   Pulse 67   Temp 98.2 °F (36.8 °C) (Oral)   Resp 16   SpO2 96%     PULSE OX within normal limits on room air as interpreted by this provider.    Physical Exam      Constitutional: The patient is cooperative. Appears well-developed and well-nourished.  Mild discomfort.  Psychological: Alert, No abnormalities of mood, affect.  Head: Normocephalic/atraumatic.  Eyes: Pupils are equal round reactive to light.  Conjunctiva  are within normal limits.  ENT: Oropharynx is clear.  Neck: The neck is supple.  No meningeal signs.  Respiratory: Respiratory effort was normal.  There is no stridor.  Air entry is equal.  Cardiovascular: Regular rate and rhythm.  Capillary refill is brisk.   Genitourinary: Not examined.  Lymphatic: No gross lymphadenopathy noted.  Musculoskeletal: Left upper extremity-Left upper extremity without acute bony deformity.  Positive swelling, ecchymosis and tenderness to palpation present at left fifth metacarpal.  Full range of motion of left hand and fingers.  Remainder of left upper extremity nontender to palpation.  Distal pulses intact.  Capillary refill normal.  Motor intact distally.  Sensory intact distally.  No erythema.  No open wounds.  No compartment syndrome.  No other edema.  Remainder of musculoskeletal system is grossly intact.  There is no obvious deformity.  Neurological: Gross motor movement is intact in all 4 extremities.  Patient exhibits normal speech.  Skin: Skin is normal to inspection, except as documented.  Warm and dry.  No obvious rash.        ED Course   Labs Reviewed - No data to display  Patient fitted and ulnar gutter postmold applied to left upper extremity.  Distal neurovascular intact of left upper extremity following application.  MDM     Differential diagnosis prior to work-up including but not limited to fracture, strain/sprain, contusion    Radiology findings: XR HAND (MIN 3 VIEWS), LEFT (CPT=73130)    Result Date: 2/3/2025  CONCLUSION:  1. Acute minimally displaced oblique fracture distal 5th metacarpal shaft. 2. Old ununited ulnar styloid fracture and dorsal triquetral avulsion fracture. 3. 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  Multiple other joints with mild-to-moderate osteoarthritis.    Dictated by (CST): Steve Machado MD on 2/03/2025 at 1:56 PM     Finalized by (CST): Steev Machado MD on 2/03/2025 at 1:59 PM            X-ray images of left hand independently viewed by this provider-positive fracture left fifth metacarpal    Physical exam remained stable as previously documented.  Available results reviewed with patient.    I have given the patient instructions regarding their diagnoses, expectations, follow up, and ER precautions. I explained to the patient that emergent conditions may arise and to go to the ER for new, worsening or any persistent conditions. I've explained the importance of following up with their doctor as instructed. The patient verbalized understanding of the discharge instructions and plan.    The patient was informed of their elevated blood pressure reading at immediate care.  They were informed of the dangers of undiagnosed and untreated hypertension.  Education regarding lifestyle modifications and the need for appropriate follow-up with their PCP to have their blood pressure re-checked within 24-48 hours was provided.    Disposition and Plan     Clinical Impression:  1. Closed displaced fracture of shaft of fifth metacarpal bone of left hand, initial encounter    2. Hand injury    3. Elevated blood pressure reading        Disposition:  Discharge    Follow-up:  [facility] Navarro [facility]  [facility] [id number] [phone]    Call in 1 day  For follow-up    Aaron Gold MD  [address] [address]  [phone]    Call in 1 day  For follow-up      Medications Prescribed:  Current Discharge Medication List

## 2025-02-06 NOTE — TELEPHONE ENCOUNTER
Patient's spouse Mi called (on Release of Information), verified patient's Name and . States patient needs refill of losartan 100 mg, medication was increased from 50 mg at his last office visit. He has been taking two pills to total 100 mg ever since. Spouse states patient has enough 50-mg pills to last for one week.     3. Essential hypertension  Elevated when checked by nurse and borderline controlled when checked by physician.  Better at home.  Encouraged to check blood pressure daily for the next week or 2 and then send in the readings.  If it is still high, then we will increase the losartan from 50 up to 100 mg a day.     New prescription pended to run through protocol. Pharmacy verified.

## 2025-02-11 RX ORDER — AMMONIUM LACTATE 12 G/100G
LOTION TOPICAL
Qty: 400 G | Refills: 5 | Status: SHIPPED | OUTPATIENT
Start: 2025-02-11

## 2025-02-11 RX ORDER — LOSARTAN POTASSIUM 100 MG/1
100 TABLET ORAL DAILY
Qty: 90 TABLET | Refills: 0 | Status: SHIPPED | OUTPATIENT
Start: 2025-02-11

## 2025-02-11 NOTE — TELEPHONE ENCOUNTER
Refill Request for medication(s):     Last Office Visit: 12/26/24    Last Refill: 04/08/2024    Pharmacy, Dosage verified: yes    Condition Update (if applicable):     Rx pended and sent to provider for approval, please advise. Thank You!

## 2025-02-11 NOTE — TELEPHONE ENCOUNTER
Please review. Protocol Failed; No Protocol    Patient's spouse Mi called (on Release of Information), verified patient's Name and . States patient needs refill of losartan 100 mg, medication was increased from 50 mg at his last office visit. He has been taking two pills to total 100 mg ever since. Spouse states patient has enough 50-mg pills to last for one week.      3. Essential hypertension  Elevated when checked by nurse and borderline controlled when checked by physician.  Better at home.  Encouraged to check blood pressure daily for the next week or 2 and then send in the readings.  If it is still high, then we will increase the losartan from 50 up to 100 mg a day.     New prescription pended to run through protocol. Pharmacy verified.           Requested Prescriptions   Pending Prescriptions Disp Refills    losartan 100 MG Oral Tab 90 tablet 3     Sig: Take 1 tablet (100 mg total) by mouth daily.       Hypertension Medications Protocol Failed - 2025  8:49 AM        Failed - Last BP reading less than 140/90     BP Readings from Last 1 Encounters:   25 149/79               Failed - Medication is active on med list        Passed - CMP or BMP in past 12 months        Passed - In person appointment or virtual visit in the past 12 mos or appointment in next 3 mos     Recent Outpatient Visits              [facility]    [facility]    4 months ago [facility]    4 months ago [facility] for annual health examination    [facility]    Office Visit    4 months ago Median mononeuropathy, unspecified laterality    [facility]    [facility]    6 months ago  Medication monitoring encounter    [facility] Rafat French MD    Office Visit          Future Appointments         Provider Department Appt Notes    In 1 month Rafat French MD [facility] 3 month    In 1 month Aaron Gold MD [facility] 6 mth follow up    In 1 month Libertad Vasquez MD [facility] 6 month                    Passed - EGFRCR or GFRNAA > 50     GFR Evaluation  EGFRCR: 86 , resulted on 12/26/2024                 Future Appointments         Provider Department Appt Notes    In 1 month Rafat French MD [facility] 3 month    In 1 month Aaron Gold MD [facility] 6 mth follow up    In 1 month Libertad Vasquez MD [facility] 6 month          Recent Outpatient Visits              1 month ago Hand dermatitis    [facility] Rafat French MD    Office Visit    4 months ago Benign prostatic hyperplasia with nocturia    [facility] Libertad Vasquez MD    Office Visit    4 months ago Encounter for annual health examination    [facility] Aaron Gold MD    Office Visit    4 months ago Median mononeuropathy, unspecified laterality    [facility] Rafat French MD    Office Visit    6 months ago [facility]ication monitoring [facility]    Office Visit

## 2025-03-18 ENCOUNTER — LAB ENCOUNTER (OUTPATIENT)
Dept: LAB | Age: 75
End: 2025-03-18
Attending: UROLOGY
Payer: MEDICARE

## 2025-03-18 ENCOUNTER — OFFICE VISIT (OUTPATIENT)
Dept: DERMATOLOGY CLINIC | Facility: CLINIC | Age: 75
End: 2025-03-18

## 2025-03-18 DIAGNOSIS — L30.9 HAND DERMATITIS: Primary | ICD-10-CM

## 2025-03-18 DIAGNOSIS — G56.10 MEDIAN MONONEUROPATHY, UNSPECIFIED LATERALITY: ICD-10-CM

## 2025-03-18 DIAGNOSIS — B07.9 VERRUCA VULGARIS: ICD-10-CM

## 2025-03-18 DIAGNOSIS — L57.0 MULTIPLE ACTINIC KERATOSES: ICD-10-CM

## 2025-03-18 DIAGNOSIS — Z51.81 MEDICATION MONITORING ENCOUNTER: ICD-10-CM

## 2025-03-18 DIAGNOSIS — R79.89 LOW TESTOSTERONE: ICD-10-CM

## 2025-03-18 DIAGNOSIS — R97.20 ELEVATED PSA: ICD-10-CM

## 2025-03-18 LAB
ALBUMIN SERPL-MCNC: 4.5 G/DL (ref 3.2–4.8)
ALBUMIN/GLOB SERPL: 1.8 {RATIO} (ref 1–2)
ALP LIVER SERPL-CCNC: 65 U/L
ALT SERPL-CCNC: 24 U/L
ANION GAP SERPL CALC-SCNC: 8 MMOL/L (ref 0–18)
AST SERPL-CCNC: 15 U/L (ref ?–34)
BASOPHILS # BLD AUTO: 0.05 X10(3) UL (ref 0–0.2)
BASOPHILS NFR BLD AUTO: 0.6 %
BILIRUB SERPL-MCNC: 0.4 MG/DL (ref 0.2–1.1)
BUN BLD-MCNC: 12 MG/DL (ref 9–23)
BUN/CREAT SERPL: 12 (ref 10–20)
CALCIUM BLD-MCNC: 9.3 MG/DL (ref 8.7–10.4)
CHLORIDE SERPL-SCNC: 103 MMOL/L (ref 98–112)
CO2 SERPL-SCNC: 26 MMOL/L (ref 21–32)
CREAT BLD-MCNC: 1 MG/DL
DEPRECATED RDW RBC AUTO: 42.8 FL (ref 35.1–46.3)
EGFRCR SERPLBLD CKD-EPI 2021: 78 ML/MIN/1.73M2 (ref 60–?)
EOSINOPHIL # BLD AUTO: 0.21 X10(3) UL (ref 0–0.7)
EOSINOPHIL NFR BLD AUTO: 2.6 %
ERYTHROCYTE [DISTWIDTH] IN BLOOD BY AUTOMATED COUNT: 13.5 % (ref 11–15)
FASTING STATUS PATIENT QL REPORTED: NO
GLOBULIN PLAS-MCNC: 2.5 G/DL (ref 2–3.5)
GLUCOSE BLD-MCNC: 264 MG/DL (ref 70–99)
HCT VFR BLD AUTO: 44.3 %
HGB BLD-MCNC: 15.1 G/DL
IMM GRANULOCYTES # BLD AUTO: 0.05 X10(3) UL (ref 0–1)
IMM GRANULOCYTES NFR BLD: 0.6 %
LYMPHOCYTES # BLD AUTO: 2.55 X10(3) UL (ref 1–4)
LYMPHOCYTES NFR BLD AUTO: 31.7 %
MCH RBC QN AUTO: 29.5 PG (ref 26–34)
MCHC RBC AUTO-ENTMCNC: 34.1 G/DL (ref 31–37)
MCV RBC AUTO: 86.5 FL
MONOCYTES # BLD AUTO: 0.72 X10(3) UL (ref 0.1–1)
MONOCYTES NFR BLD AUTO: 8.9 %
NEUTROPHILS # BLD AUTO: 4.47 X10 (3) UL (ref 1.5–7.7)
NEUTROPHILS # BLD AUTO: 4.47 X10(3) UL (ref 1.5–7.7)
NEUTROPHILS NFR BLD AUTO: 55.6 %
OSMOLALITY SERPL CALC.SUM OF ELEC: 293 MOSM/KG (ref 275–295)
PLATELET # BLD AUTO: 176 10(3)UL (ref 150–450)
POTASSIUM SERPL-SCNC: 4.4 MMOL/L (ref 3.5–5.1)
PROT SERPL-MCNC: 7 G/DL (ref 5.7–8.2)
PSA SERPL-MCNC: 5.37 NG/ML (ref ?–4)
RBC # BLD AUTO: 5.12 X10(6)UL
SODIUM SERPL-SCNC: 137 MMOL/L (ref 136–145)
TESTOST SERPL-MCNC: 374.78 NG/DL
WBC # BLD AUTO: 8.1 X10(3) UL (ref 4–11)

## 2025-03-18 PROCEDURE — 85025 COMPLETE CBC W/AUTO DIFF WBC: CPT

## 2025-03-18 PROCEDURE — 17003 DESTRUCT PREMALG LES 2-14: CPT | Performed by: STUDENT IN AN ORGANIZED HEALTH CARE EDUCATION/TRAINING PROGRAM

## 2025-03-18 PROCEDURE — 17000 DESTRUCT PREMALG LESION: CPT | Performed by: STUDENT IN AN ORGANIZED HEALTH CARE EDUCATION/TRAINING PROGRAM

## 2025-03-18 PROCEDURE — 84403 ASSAY OF TOTAL TESTOSTERONE: CPT

## 2025-03-18 PROCEDURE — 36415 COLL VENOUS BLD VENIPUNCTURE: CPT

## 2025-03-18 PROCEDURE — 84153 ASSAY OF PSA TOTAL: CPT

## 2025-03-18 PROCEDURE — 17110 DESTRUCTION B9 LES UP TO 14: CPT | Performed by: STUDENT IN AN ORGANIZED HEALTH CARE EDUCATION/TRAINING PROGRAM

## 2025-03-18 PROCEDURE — 80053 COMPREHEN METABOLIC PANEL: CPT

## 2025-03-18 PROCEDURE — 99214 OFFICE O/P EST MOD 30 MIN: CPT | Performed by: STUDENT IN AN ORGANIZED HEALTH CARE EDUCATION/TRAINING PROGRAM

## 2025-03-18 NOTE — PROGRESS NOTES
October 1, 2024    Established patient     Referred by:   No referring provider defined for this encounter.     CHIEF COMPLAINT: FBSE    HISTORY OF PRESENT ILLNESS: .    Hyperkeratosis  Location: R lower leg    Signs and symptoms: Pt reports some improvement and its almost healed but came back again  Current treatment: tacrolimus (PROTOPIC) 0.1 % Ointment and triamcinolone 0.1 % Ointment   Past treatments: tacrolimus (PROTOPIC) 0.1 % Ointment and triamcinolone 0.1 % Ointment     2. Hyperkeratosis   Location: feet    Signs and symptoms: Pt reports some improvement and its almost healed but came back again  Past treatments: tacrolimus (PROTOPIC) 0.1 % Ointment and triamcinolone 0.1 % Ointment     DERM HISTORY:  History of skin cancer: No    FAMILY HISTORY:  History of melanoma: No    PAST MEDICAL HISTORY:  Past Medical History:    Allergic rhinitis    Per NG: desensitization: mold and dust mite    BPH (benign prostatic hyperplasia)    Diabetes (HCC)    Esophageal reflux    Family history of malignant neoplasm of prostate    Fx wrist    Per NG: Fx both wrists    Hemorrhoids    High blood pressure    High cholesterol    Other and unspecified hyperlipidemia    Peptic ulcer disease    Sleep apnea    USES CPAP    Stented coronary artery    Visual impairment    READERS       REVIEW OF SYSTEMS:  Constitutional: Denies fever, chills, unintentional weight loss.   Skin as per HPI    Medications  Current Outpatient Medications   Medication Sig Dispense Refill    losartan 100 MG Oral Tab Take 1 tablet (100 mg total) by mouth daily. 90 tablet 0    ammonium lactate 12 % External Lotion APPLY 1 APPLICATION TOPICALLY DAILY FOR 60 DOSES. 400 g 5    TRIAMCINOLONE 0.1 % External Ointment APPLY TWICE DAILY TO NEW ITCHY PINK SPOTS 454 g 0    Mycophenolate Mofetil (CELLCEPT) 500 MG Oral Tab Take 2 tablets (1,000 mg total) by mouth 2 (two) times daily. 120 tablet 2    MOMETASONE 0.1 % External Ointment USE TWICE DAILY ON HANDS AND FEET  MONDAY THROUGH FRIDAY. ON FEET, MIX WITH UREA. 45 g 1    Testosterone 1.62 % Transdermal Gel Apply 1 Act topically daily. APPLY 1 PUMP TO EACH UPPER ARM AND SHOULDER AREA TOPICALLY ONCE DAILY TO DELIVER A TOTAL DOSE OF 40.5 MG 75 g 5    Mycophenolate Mofetil (CELLCEPT) 500 MG Oral Tab Take 1 tablet (500 mg total) by mouth 2 (two) times daily. 60 tablet 0    metFORMIN HCl 1000 MG Oral Tab Take 1 tablet (1,000 mg total) by mouth 2 (two) times daily with meals. 180 tablet 1    tacrolimus (PROTOPIC) 0.1 % External Ointment Apply 1 Application topically 2 (two) times daily. 30 g 3    Tri-Mix Standard (PPP) Injection Solution 0.2 mL by Intracavernosal route as needed.     Inject intracavernosally as directed prior to intercourse     Tri-Mix Standard Recipe:  - Prostaglandin E1 5.88 ug/ml  - Papaverine HCI 18mg/ ml  - Phentolamine Mesylate 0.6 mg/ ml      Tretinoin 0.1 % External Cream Use thin layer once every night to feet 45 g 2    Fenofibrate 160 MG Oral Tab Take 1 tablet (160 mg total) by mouth every evening. 90 tablet 3    Multiple Vitamin (MULTI-VITAMIN) Oral Tab multivitamin tablet, [RxNorm: 0]      Urea 40 % External Cream       Tadalafil 20 MG Oral Tab Take 1 tablet (20 mg total) by mouth daily as needed for Erectile Dysfunction. 4 tablet 11    atorvastatin 20 MG Oral Tab Take 1 tablet (20 mg total) by mouth daily. 90 tablet 3    clobetasol 0.05 % External Ointment After using ketoconazole for 4 weeks start this ointment twice daily  Monday-Friday 30 g 2    ketoconazole 2 % External Cream Apply to affected area 2 times daily for 4 weeks. Use between toes as well. 60 g 3    Psyllium (METAMUCIL) 0.36 g Oral Cap       triamcinolone acetonide 0.1 % External Ointment       Fluocinonide 0.05 % External Ointment       ASPIRIN 81 OR Take 81 mg by mouth daily. Stopped 7x days prior to procedure 10/7      Multiple Vitamins-Minerals (MULTIVITAL) Oral Tab Take 1 tablet by mouth daily.         PHYSICAL EXAM:  Patient declined  chaperone   General: awake, alert, no acute distress  Skin: Skin exam was performed today including the following: head and face, scalp, neck, chest (including breasts and axillae), abdomen, back, bilateral upper extremities, bilateral lower extremities, hands, feet, digits, nails. Pertinent findings include:   - Hands clear  - R foot mild hyperkeratotic plaques  - R temple with a Verrucous papules   - L temple with few pink gritty papules     ASSESSMENT & PLAN:  Pathophysiology of diagnoses discussed with patient.  Therapeutic options reviewed. Risks, benefits, and alternatives discussed with patient. Instructions reviewed at length.    #Hand/Foot dermatitis, improved from prior  #Medication monitoring  - continue cellcept to 1000mg twice daily  Risks, benefits, and alternatives discussed with patient.  - Labs today then repeat every 3 months    #Multiple actinic keratoses  - Discussed premalignant etiology and possibility of transformation to SCC  - Recommended cryotherapy today   - Discussed side effects including redness, swelling, crusting, and discolortion after treatment, wound care with soap/water and vaseline   - Recommend sun protection with spf 30 or higher, sun protective clothing such as wide brimmed hats and long sleeves. Recommend avoiding midday sun (10 am- 3 pm).     - Procedure Note Cryosurgery of pre-malignant lesion(s)  Risks, benefits, alternatives, complications, and personnel required for cryosurgery reviewed with patient. Patient verbalizes understanding and wishes to proceed.   - Cryosurgery performed with Liquid Nitrogen via cryostat spray gun to Actinic Keratosis . 3 lesion(s) treated.   - Patient tolerated well and wound care discussed. Return if lesions fail to fully resolve.    #Verruca vulgaris  - Patient elected cryotherapy today     - Cryosurgery of non-malignant lesion(s)    Risks, benefits, alternatives and personnel required for cryosurgery reviewed with patient. Possible adverse  effects reviewed including, but not limited to: redness, swelling, blister formation, postinflammatory pigment alteration, ring wart formation, scarring, recurrence. Pt verbalizes understanding and wishes to proceed.     Cryosurgery performed with Liquid Nitrogen via cryostat spray gun to warts. 1 lesion(s) treated.     Patient tolerated well.      Return to clinic: 3 months or sooner if something concerning arises     Rafat French MD    By signing my name below, INelson MA,  attest that this documentation has been prepared under the direction and in the presence of Rafat French MD.   Electronically Signed: Nelson BRUNNER MA, 3/18/2025, 12:20 PM.    I, Rafat French MD,  personally performed the services described in this documentation. All medical record entries made by the scribe were at my direction and in my presence.  I have reviewed the chart and agree that the record reflects my personal performance and is accurate and complete.  Rafat French MD, 3/18/2025, 12:56 PM

## 2025-03-31 ENCOUNTER — TELEPHONE (OUTPATIENT)
Dept: INTERNAL MEDICINE CLINIC | Facility: CLINIC | Age: 75
End: 2025-03-31

## 2025-03-31 RX ORDER — MULTIVIT-MIN/IRON/FOLIC ACID/K 18-600-40
1 CAPSULE ORAL DAILY
COMMUNITY

## 2025-03-31 NOTE — TELEPHONE ENCOUNTER
COMPREHENSIVE MEDICATION REVIEW         Abhijeet Rosas MRN [id number]    3/29/1950 PCP Aaron Gold MD     Comments: Medication history completed by [facility]ulatory Clinic Pharmacist with spouse, Mi (ok per HIPAA) over the phone on 3/31/25. Patient has upcoming AWV with PCP on 25.     After thorough medication review, 14 discrepancies have been identified and corrected on patient's medication list. See updated list below:     Outpatient Encounter Medications as of 3/31/2025   Medication Sig    Cholecalciferol (VITAMIN D) 50 MCG (2000 UT) Oral Cap Take 1 capsule (2,000 Units total) by mouth daily.    losartan 100 MG Oral Tab Take 1 tablet (100 mg total) by mouth daily.    ammonium lactate 12 % External Lotion APPLY 1 APPLICATION TOPICALLY DAILY FOR 60 DOSES.    TRIAMCINOLONE 0.1 % External Ointment APPLY TWICE DAILY TO NEW ITCHY PINK SPOTS    Testosterone 1.62 % Transdermal Gel Apply 1 Act topically daily. APPLY 1 PUMP TO EACH UPPER ARM AND SHOULDER AREA TOPICALLY ONCE DAILY TO DELIVER A TOTAL DOSE OF 40.5 MG    Mycophenolate Mofetil (CELLCEPT) 500 MG Oral Tab Take 1 tablet (500 mg total) by mouth 2 (two) times daily.    metFORMIN HCl 1000 MG Oral Tab Take 1 tablet (1,000 mg total) by mouth 2 (two) times daily with meals.    atorvastatin 20 MG Oral Tab Take 1 tablet (20 mg total) by mouth daily.    Psyllium (METAMUCIL) 0.36 g Oral Cap Take 1 capsule by mouth daily.    aspirin 81 MG Oral Tab EC Take 1 tablet (81 mg total) by mouth daily.    Tri-Mix Standard (PPP) Injection Solution 0.2 mL by Intracavernosal route as needed.     Inject intracavernosally as directed prior to intercourse     Tri-Mix Standard Recipe:  - Prostaglandin E1 5.88 ug/ml  - Papaverine HCI 18mg/ ml  - Phentolamine Mesylate 0.6 mg/ ml     Medication Assessment:   Reviewed all medications in detail with spouse including dose, indication, timing of administration, monitoring parameters, and potential side effects of medications.     Spouse  reports patient is taking atorvastatin 20 mg daily and losartan 100 mg daily as prescribed. He does not monitor his blood pressure at home. Did recommend patient monitor his blood pressure 2-3 times weekly and bring readings in to all MD appointments for review.     Spouse also confirms patient is taking metformin 1000 mg twice daily as prescribed.     Did provide education and stressed the importance of taking medication just like prescribed to get the most benefit. Spouse denies patient forgetting or missing medication doses. Spouse is requesting refills on metformin and losartan; will pend in visit encounter for PCP review.     Thank you,    Bell Busch, PharmD, 3/31/2025, 1:59 PM

## 2025-04-01 NOTE — PROGRESS NOTES
HPI:    Patient ID: Abhijeet Rosas is a 75 year old male.    HPI    Patient returns to the office today to discuss chronic medical issues as listed on the active problem list below.  Patient last seen in the office by me on Tober second of last year for annual visit and annual wellness visit.  Blood pressure borderline at that time.  Full blood work was done at that time remarkable for an A1c of 9.7.  During the last visit, the following changes were made: Increase in metformin from 1000 mg once a day up to twice a day.  Since the last visit, the patient has seen the following doctors: Urology, eye doctor, dermatology, orthopedics.  Patient was emergency room in early February for a hand fracture. It has healed up well after casting.     Today, the patient offers the following complaints: He is frustrated by lack of weight loss. Not checking sugars. Checks BP rarely.  Patient describes diet as not great for the past couple weeks. Should be getting better. He is up a few pounds.   For exercise, the patient goes to the gym 3 times a week for walking and free weights.   Tobacco and alcohol use reviewed.   Current medications reviewed.   Health maintenance issues reviewed.    Wt Readings from Last 6 Encounters:   10/02/24 244 lb (110.7 kg)   06/26/24 240 lb (108.9 kg)   03/11/24 244 lb (110.7 kg)   12/04/23 241 lb (109.3 kg)   10/17/23 241 lb (109.3 kg)   03/23/23 243 lb (110.2 kg)       Patient Active Problem List   Diagnosis    Impotence of organic origin    Osteoarthritis    Mixed hyperlipidemia    Chronic allergic rhinitis due to pollen    Prostate nodule    Ex-smoker    Elevated PSA    Medicare annual wellness visit, subsequent    Fatty liver    Periumbilical hernia    History of percutaneous coronary intervention    Type 2 diabetes mellitus without complication, without long-term current use of insulin (HCC)    Chronic ischemic heart disease    Obstructive sleep apnea on CPAP    Essential hypertension    Status  post right knee replacement    Wrist arthritis    Dermatitis        HISTORY:  Past Medical History:    Allergic rhinitis    Per NG: desensitization: mold and dust mite    BPH (benign prostatic hyperplasia)    Diabetes (HCC)    Esophageal reflux    Family history of malignant neoplasm of prostate    Fx wrist    Per NG: Fx both wrists    Hemorrhoids    High blood pressure    High cholesterol    Other and unspecified hyperlipidemia    Peptic ulcer disease    Sleep apnea    USES CPAP    Stented coronary artery    Visual impairment    READERS      Past Surgical History:   Procedure Laterality Date    Cath bare metal stent (bms)      Colonoscopy      Colonoscopy      Colonoscopy N/A 2018    Procedure: COLONOSCOPY;  Surgeon: Clarence De La Torre MD;  Location: [facility] ENDOSCOPY    Egd      Electrocardiogram, complete  2013    scanned to media tab    Other surgical history  2010    Per NG: UP3    Other surgical history  2010    Per NG: T &A    Other surgical history  2010    Per NG: septo; turb. red; smr of turbs.    Tonsillectomy      Total knee replacement Right 2020    Upper gi endoscopy,biopsy        Family History   Problem Relation Age of Onset    Alcohol and Other Disorders Associated Father     Obesity Father     Heart Attack Father 48        Per NG: massive heart attack ( cause of death)    Cancer Mother 72        Per NG: leukemia ( cause of death)    Other (Other) Maternal Grandmother     Other (Other) Maternal Grandfather     Other (Other) Paternal Grandmother     Other (Other) Paternal Grandfather     Kidney Disease Brother         Per NG: Renal disease      Social History     Socioeconomic History    Marital status:    Tobacco Use    Smoking status: Former     Current packs/day: 0.00     Average packs/day: 1 pack/day for 20.0 years (20.0 ttl pk-yrs)     Types: Cigars, Cigarettes     Start date:      Quit date:      Years since quittin.2     Passive exposure: Past    Smokeless  tobacco: Never   Vaping Use    Vaping status: Never Used   Substance and Sexual Activity    Alcohol use: Not Currently     Alcohol/week: 1.0 standard drink of alcohol     Comment: Social drinker    Drug use: Never    Sexual activity: Not Currently   Other Topics Concern    Caffeine Concern Yes     Comment: Per NG: coffee, 3 cups    Exercise Yes     Comment: twice a week    History of tanning Yes    Reaction to local anesthetic No    Pt has a pacemaker No    Pt has a defibrillator No          Review of Systems          Current Outpatient Medications   Medication Sig Dispense Refill    Cholecalciferol (VITAMIN D) 50 MCG (2000 UT) Oral Cap Take 1 capsule (2,000 Units total) by mouth daily.      losartan 100 MG Oral Tab Take 1 tablet (100 mg total) by mouth daily. 90 tablet 0    ammonium lactate 12 % External Lotion APPLY 1 APPLICATION TOPICALLY DAILY FOR 60 DOSES. 400 g 5    TRIAMCINOLONE 0.1 % External Ointment APPLY TWICE DAILY TO NEW ITCHY PINK SPOTS 454 g 0    Testosterone 1.62 % Transdermal Gel Apply 1 Act topically daily. APPLY 1 PUMP TO EACH UPPER ARM AND SHOULDER AREA TOPICALLY ONCE DAILY TO DELIVER A TOTAL DOSE OF 40.5 MG 75 g 5    Mycophenolate Mofetil (CELLCEPT) 500 MG Oral Tab Take 1 tablet (500 mg total) by mouth 2 (two) times daily. 60 tablet 0    metFORMIN HCl 1000 MG Oral Tab Take 1 tablet (1,000 mg total) by mouth 2 (two) times daily with meals. 180 tablet 1    Tri-Mix Standard (PPP) Injection Solution 0.2 mL by Intracavernosal route as needed.     Inject intracavernosally as directed prior to intercourse     Tri-Mix Standard Recipe:  - Prostaglandin E1 5.88 ug/ml  - Papaverine HCI 18mg/ ml  - Phentolamine Mesylate 0.6 mg/ ml      atorvastatin 20 MG Oral Tab Take 1 tablet (20 mg total) by mouth daily. 90 tablet 3    Psyllium (METAMUCIL) 0.36 g Oral Cap Take 1 capsule by mouth daily.      aspirin 81 MG Oral Tab EC Take 1 tablet (81 mg total) by mouth daily.       Allergies:Allergies[1]     PHYSICAL  EXAM:   There were no vitals taken for this visit.     Physical Exam  Constitutional:       Appearance: Normal appearance. He is well-developed. He is obese.   HENT:      Right Ear: Tympanic membrane and ear canal normal.      Left Ear: Tympanic membrane and ear canal normal.      Nose: Nose normal.      Mouth/Throat:      Pharynx: No oropharyngeal exudate or posterior oropharyngeal erythema.   Eyes:      Conjunctiva/sclera: Conjunctivae normal.   Neck:      Vascular: No carotid bruit.   Cardiovascular:      Rate and Rhythm: Normal rate and regular rhythm.      Pulses: Normal pulses.      Heart sounds: Normal heart sounds. No murmur heard.  Pulmonary:      Effort: Pulmonary effort is normal.      Breath sounds: Normal breath sounds. No wheezing or rales.   Abdominal:      General: Bowel sounds are normal.      Palpations: Abdomen is soft. There is no mass.      Tenderness: There is no abdominal tenderness.   Musculoskeletal:      Right lower leg: No edema.      Left lower leg: No edema.   Lymphadenopathy:      Cervical: No cervical adenopathy.   Skin:     General: Skin is warm and dry.      Findings: No rash.   Neurological:      General: No focal deficit present.      Mental Status: He is alert.   Psychiatric:         Mood and Affect: Mood normal.         Behavior: Behavior normal.         Thought Content: Thought content normal.                 ASSESSMENT/PLAN:   1. Type 2 diabetes mellitus without complication, without long-term current use of insulin (HCC)  Diabetes has been better.  A1c has gone from 9.7 down to 8.1.  Still not fully controlled.  Patient is 75 so we have to temper that.  Various options discussed.  Patient does not want to start any new medications.  Continue metformin 1000 mg twice a day.  He promises to work on diet, exercise, weight loss.  Follow-up in 3 to 6 months to repeat A1c.  If still elevated at that time, consider second diabetes medication possibly Ozempic or Jardiance.  - POC  Hemoglobin A1C    2. Essential hypertension  Slightly elevated when checked by nurse.  Better when checked by physician.  Continue same dose of losartan 100 mg.  Check blood pressure at home.    3. Mixed hyperlipidemia  Stable.  Continue with atorvastatin 20 mg a day.    4. Obstructive sleep apnea on CPAP  Stable.  Continue current treatment.        Meds This Visit:  Requested Prescriptions     Pending Prescriptions Disp Refills    metFORMIN HCl 1000 MG Oral Tab 180 tablet 3     Sig: Take 1 tablet (1,000 mg total) by mouth 2 (two) times daily with meals.    losartan 100 MG Oral Tab 90 tablet 3     Sig: Take 1 tablet (100 mg total) by mouth daily.       Imaging & Referrals:  None         Aaron Gold MD          [1] No Known Allergies

## 2025-04-02 ENCOUNTER — OFFICE VISIT (OUTPATIENT)
Dept: INTERNAL MEDICINE CLINIC | Facility: CLINIC | Age: 75
End: 2025-04-02
Payer: MEDICARE

## 2025-04-02 VITALS
HEIGHT: 69 IN | OXYGEN SATURATION: 97 % | BODY MASS INDEX: 35.84 KG/M2 | HEART RATE: 60 BPM | TEMPERATURE: 98 F | DIASTOLIC BLOOD PRESSURE: 78 MMHG | WEIGHT: 242 LBS | RESPIRATION RATE: 20 BRPM | SYSTOLIC BLOOD PRESSURE: 136 MMHG

## 2025-04-02 DIAGNOSIS — I10 ESSENTIAL HYPERTENSION: ICD-10-CM

## 2025-04-02 DIAGNOSIS — G47.33 OBSTRUCTIVE SLEEP APNEA ON CPAP: ICD-10-CM

## 2025-04-02 DIAGNOSIS — E11.9 TYPE 2 DIABETES MELLITUS WITHOUT COMPLICATION, WITHOUT LONG-TERM CURRENT USE OF INSULIN (HCC): Primary | ICD-10-CM

## 2025-04-02 DIAGNOSIS — E78.2 MIXED HYPERLIPIDEMIA: ICD-10-CM

## 2025-04-02 LAB — HEMOGLOBIN A1C: 8.1 % (ref 4.3–5.6)

## 2025-04-02 PROCEDURE — G2211 COMPLEX E/M VISIT ADD ON: HCPCS | Performed by: INTERNAL MEDICINE

## 2025-04-02 PROCEDURE — 83036 HEMOGLOBIN GLYCOSYLATED A1C: CPT | Performed by: INTERNAL MEDICINE

## 2025-04-02 PROCEDURE — 99213 OFFICE O/P EST LOW 20 MIN: CPT | Performed by: INTERNAL MEDICINE

## 2025-04-02 RX ORDER — LOSARTAN POTASSIUM 100 MG/1
100 TABLET ORAL DAILY
Qty: 90 TABLET | Refills: 1 | Status: SHIPPED | OUTPATIENT
Start: 2025-04-02

## 2025-04-02 RX ORDER — LOSARTAN POTASSIUM 100 MG/1
100 TABLET ORAL DAILY
Qty: 90 TABLET | Refills: 3 | Status: CANCELLED
Start: 2025-04-02

## 2025-04-07 ENCOUNTER — OFFICE VISIT (OUTPATIENT)
Dept: SURGERY | Facility: CLINIC | Age: 75
End: 2025-04-07
Payer: MEDICARE

## 2025-04-07 DIAGNOSIS — N40.1 BENIGN PROSTATIC HYPERPLASIA WITH NOCTURIA: ICD-10-CM

## 2025-04-07 DIAGNOSIS — N52.01 ERECTILE DYSFUNCTION DUE TO ARTERIAL INSUFFICIENCY: ICD-10-CM

## 2025-04-07 DIAGNOSIS — R79.89 LOW TESTOSTERONE: Primary | ICD-10-CM

## 2025-04-07 DIAGNOSIS — R35.1 BENIGN PROSTATIC HYPERPLASIA WITH NOCTURIA: ICD-10-CM

## 2025-04-07 PROCEDURE — 99213 OFFICE O/P EST LOW 20 MIN: CPT | Performed by: UROLOGY

## 2025-04-07 RX ORDER — TESTOSTERONE 20.25 MG/1.25G
1 GEL TOPICAL DAILY
Qty: 75 G | Refills: 5 | Status: SHIPPED | OUTPATIENT
Start: 2025-04-07

## 2025-04-07 NOTE — PROGRESS NOTES
Abhijeet Rosas is a 75 year old male.    HPI:     Chief Complaint   Patient presents with    BPH    elevated psa     6 month visit, pt has no urology complaints PSA done 3/18/25     75-year-old male in follow-up to visit October 7, 2024 with erectile dysfunction, low serum testosterone.    Continues on quad mix 0.5 mL injections.  Gets a partial erection with good elongation but not rigid enough for vaginal penetration.  Reports normal libido.    Has a history of low serum testosterone for which he remains on AndroGel.  Repeat testosterone March 18, 2025 normal at 374 ng/dL.  CBC and CMP both unremarkable.  PSA stable at 5.37.    Has a chronic history of elevated PSA, status post multiple prostate biopsies x 5 all of which have been negative.  PSAs are in the 4-8range as per below.  He denies any bone pain or weight loss.     PSA TOTAL PSA   Latest Ref Rng <=4.00 ng/mL <=4.00 ng/mL   6/10/2011 2.9     12/6/2011 4.5 (H)     6/7/2012 2.5     12/31/2012 2.6     6/17/2013 3.6     3/11/2014 4.8 (H)     8/11/2014 3.0     12/16/2014 3.6     8/10/2015 5.0 (H)     2/17/2016 3.4     9/2/2016 2.6     2/28/2017 3.3     9/13/2017 2.9     3/5/2018 3.1     2/7/2019 4.92 (H)      5.3 (H)     8/5/2019 4.29 (H)     1/31/2020 6.13 (H)     8/24/2020 8.64 (H)     4/12/2021 4.31 (H)     10/11/2021 5.23 (H)     4/27/2022 5.82 (H)     12/19/2022 6.55 (H)     10/10/2023 5.92 (H)     4/1/2024  4.77 (H)    3/18/2025  5.37 (H)       Legend:  (H) High    HISTORY:  Past Medical History:    Allergic rhinitis    Per NG: desensitization: mold and dust mite    BPH (benign prostatic hyperplasia)    Diabetes (HCC)    Esophageal reflux    Family history of malignant neoplasm of prostate    Fx wrist    Per NG: Fx both wrists    Hemorrhoids    High blood pressure    High cholesterol    Other and unspecified hyperlipidemia    Peptic ulcer disease    Sleep apnea    USES CPAP    Stented coronary artery    Visual impairment    READERS      Past Surgical  History:   Procedure Laterality Date    Cath bare metal stent (bms)      Colonoscopy      Colonoscopy      Colonoscopy N/A 2018    Procedure: COLONOSCOPY;  Surgeon: Clarence De La Torre MD;  Location: [facility] ENDOSCOPY    Egd      Electrocardiogram, complete  2013    scanned to media tab    Other surgical history  2010    Per NG: UP3    Other surgical history      Per NG: T &A    Other surgical history  2010    Per NG: septo; turb. red; smr of turbs.    Tonsillectomy      Total knee replacement Right 2020    Upper gi endoscopy,biopsy        Family History   Problem Relation Age of Onset    Alcohol and Other Disorders Associated Father     Obesity Father     Heart Attack Father 48        Per NG: massive heart attack ( cause of death)    Cancer Mother 72        Per NG: leukemia ( cause of death)    Other (Other) Maternal Grandmother     Other (Other) Maternal Grandfather     Other (Other) Paternal Grandmother     Other (Other) Paternal Grandfather     Kidney Disease Brother         Per NG: Renal disease      Social History:   Social History     Socioeconomic History    Marital status:    Tobacco Use    Smoking status: Former     Current packs/day: 0.00     Average packs/day: 1 pack/day for 20.0 years (20.0 ttl pk-yrs)     Types: Cigars, Cigarettes     Start date:      Quit date:      Years since quittin.2     Passive exposure: Past    Smokeless tobacco: Never   Vaping Use    Vaping status: Never Used   Substance and Sexual Activity    Alcohol use: Not Currently     Alcohol/week: 1.0 standard drink of alcohol     Comment: Social drinker    Drug use: Never    Sexual activity: Not Currently   Other Topics Concern    Caffeine Concern Yes     Comment: Per NG: coffee, 3 cups    Exercise Yes     Comment: twice a week    History of tanning Yes    Reaction to local anesthetic No    Pt has a pacemaker No    Pt has a defibrillator No     Social Drivers of Health     Food Insecurity: No Food  Insecurity (4/2/2025)    NCSS - Food Insecurity     Worried About Running Out of Food in the Last Year: No     Ran Out of Food in the Last Year: No   Transportation Needs: No Transportation Needs (4/2/2025)    NCSS - Transportation     Lack of Transportation: No   Housing Stability: Not At Risk (4/2/2025)    NCSS - Housing/Utilities     Has Housing: Yes     Worried About Losing Housing: No     Unable to Get Utilities: No        Medications (Active prior to today's visit):  Current Outpatient Medications   Medication Sig Dispense Refill    metFORMIN HCl 1000 MG Oral Tab Take 1 tablet (1,000 mg total) by mouth 2 (two) times daily with meals. 180 tablet 1    losartan 100 MG Oral Tab Take 1 tablet (100 mg total) by mouth daily. 90 tablet 1    Cholecalciferol (VITAMIN D) 50 MCG (2000 UT) Oral Cap Take 1 capsule (2,000 Units total) by mouth daily.      ammonium lactate 12 % External Lotion APPLY 1 APPLICATION TOPICALLY DAILY FOR 60 DOSES. 400 g 5    TRIAMCINOLONE 0.1 % External Ointment APPLY TWICE DAILY TO NEW ITCHY PINK SPOTS 454 g 0    Testosterone 1.62 % Transdermal Gel Apply 1 Act topically daily. APPLY 1 PUMP TO EACH UPPER ARM AND SHOULDER AREA TOPICALLY ONCE DAILY TO DELIVER A TOTAL DOSE OF 40.5 MG 75 g 5    Mycophenolate Mofetil (CELLCEPT) 500 MG Oral Tab Take 1 tablet (500 mg total) by mouth 2 (two) times daily. 60 tablet 0    Tri-Mix Standard (PPP) Injection Solution 0.2 mL by Intracavernosal route as needed.     Inject intracavernosally as directed prior to intercourse     Tri-Mix Standard Recipe:  - Prostaglandin E1 5.88 ug/ml  - Papaverine HCI 18mg/ ml  - Phentolamine Mesylate 0.6 mg/ ml      atorvastatin 20 MG Oral Tab Take 1 tablet (20 mg total) by mouth daily. 90 tablet 3    Psyllium (METAMUCIL) 0.36 g Oral Cap Take 1 capsule by mouth daily.      aspirin 81 MG Oral Tab EC Take 1 tablet (81 mg total) by mouth daily.         Allergies:  Allergies[1]      ROS:       PHYSICAL EXAM:        ASSESSMENT/PLAN:    Assessment   Encounter Diagnoses   Name Primary?    Low testosterone Yes    Benign prostatic hyperplasia with nocturia     Erectile dysfunction due to arterial insufficiency        Recommend:  -Continue on Androgel.  Rx refill provided.  Follow-up in 1 year with CBC, CMP, testosterone and PSA levels.  -Continue on quad mix injections as needed.  I suggested using a penile vacuum device and he will consider this in addition to the intracavernosal injection.  - Elevated PSA: Stable.  Continue to observe.  Repeat PSA in 1 year           Orders This Visit:  No orders of the defined types were placed in this encounter.      Meds This Visit:  Requested Prescriptions      No prescriptions requested or ordered in this encounter       Imaging & Referrals:  None     4/7/2025  Libertad Vasquez MD               [1] No Known Allergies

## 2025-04-23 NOTE — TELEPHONE ENCOUNTER
Rx request, please review and sign off if appropriate. Thank you.    Last seen: 4/7/25  Last refill: script not on active med list.

## 2025-04-30 ENCOUNTER — PATIENT MESSAGE (OUTPATIENT)
Dept: SURGERY | Facility: CLINIC | Age: 75
End: 2025-04-30

## 2025-05-08 NOTE — TELEPHONE ENCOUNTER
I don't see any mention of Sildenafil or Tadalafil in the note from his previous visit. It was recommended that he continue with QuadMix injections and try using a MIGUEL with it. The last mention of Sildenafil and Tadalafil is from 12/2023 where Dr Vasquez mentions that they were both ineffective for him.

## 2025-05-15 RX ORDER — TADALAFIL 20 MG/1
20 TABLET ORAL DAILY PRN
Qty: 4 TABLET | Refills: 0 | OUTPATIENT
Start: 2025-05-15

## 2025-05-30 NOTE — TELEPHONE ENCOUNTER
Refill Request for Triamcinolone 0.1% Ext Ointment    LOV: 3/18/2025  Last Refill: 1/16/2025  Next Scheduled Appt: 3/18/2025    Rx pended and routed to DM

## 2025-06-02 RX ORDER — TRIAMCINOLONE ACETONIDE 1 MG/G
OINTMENT TOPICAL
Qty: 454 G | Refills: 0 | Status: SHIPPED | OUTPATIENT
Start: 2025-06-02

## 2025-06-18 ENCOUNTER — OFFICE VISIT (OUTPATIENT)
Dept: DERMATOLOGY CLINIC | Facility: CLINIC | Age: 75
End: 2025-06-18
Payer: MEDICARE

## 2025-06-18 DIAGNOSIS — L30.9 HAND DERMATITIS: Primary | ICD-10-CM

## 2025-06-18 DIAGNOSIS — L57.0 MULTIPLE ACTINIC KERATOSES: ICD-10-CM

## 2025-06-18 PROCEDURE — 17003 DESTRUCT PREMALG LES 2-14: CPT | Performed by: STUDENT IN AN ORGANIZED HEALTH CARE EDUCATION/TRAINING PROGRAM

## 2025-06-18 PROCEDURE — 17000 DESTRUCT PREMALG LESION: CPT | Performed by: STUDENT IN AN ORGANIZED HEALTH CARE EDUCATION/TRAINING PROGRAM

## 2025-06-18 PROCEDURE — 99214 OFFICE O/P EST MOD 30 MIN: CPT | Performed by: STUDENT IN AN ORGANIZED HEALTH CARE EDUCATION/TRAINING PROGRAM

## 2025-06-18 RX ORDER — TADALAFIL 20 MG/1
20 TABLET ORAL DAILY PRN
COMMUNITY
Start: 2025-04-28

## 2025-06-18 RX ORDER — FLUOCINOLONE ACETONIDE 0.11 MG/ML
OIL AURICULAR (OTIC)
COMMUNITY
Start: 2025-04-03

## 2025-06-18 RX ORDER — PEN NEEDLE, DIABETIC 29 G X1/2"
1 NEEDLE, DISPOSABLE MISCELLANEOUS AS DIRECTED
COMMUNITY
Start: 2025-05-11

## 2025-06-18 NOTE — PROGRESS NOTES
Established patient     Referred by:   No referring provider defined for this encounter.     CHIEF COMPLAINT: Follow Up    HISTORY OF PRESENT ILLNESS: .    AKs  Location: Face  Signs and symptoms: None  Current treatment: None  Past treatments: Cryotherapy    2. Dermatitis   Location: Hand & Foot   Signs and symptoms: Improvement  Current treatment: Cellcept 500mg BID, Triamcinolone 0.1% Ointment   Past treatments: tacrolimus (PROTOPIC) 0.1 % Ointment and triamcinolone 0.1 % Ointment     DERM HISTORY:  History of skin cancer: No    FAMILY HISTORY:  History of melanoma: No    PAST MEDICAL HISTORY:  Past Medical History:    Allergic rhinitis    Per NG: desensitization: mold and dust mite    BPH (benign prostatic hyperplasia)    Diabetes (HCC)    Esophageal reflux    Family history of malignant neoplasm of prostate    Fx wrist    Per NG: Fx both wrists    Hemorrhoids    High blood pressure    High cholesterol    Other and unspecified hyperlipidemia    Peptic ulcer disease    Sleep apnea    USES CPAP    Stented coronary artery    Visual impairment    READERS       REVIEW OF SYSTEMS:  Constitutional: Denies fever, chills, unintentional weight loss.   Skin as per HPI    Medications  Current Outpatient Medications   Medication Sig Dispense Refill    TRIAMCINOLONE 0.1 % External Ointment APPLY TWICE DAILY TO NEW ITCHY PINK SPOTS 454 g 0    Testosterone 1.62 % Transdermal Gel Apply 1 Act topically daily. APPLY 1 PUMP TO EACH UPPER ARM AND SHOULDER AREA TOPICALLY ONCE DAILY TO DELIVER A TOTAL DOSE OF 40.5 MG 75 g 5    metFORMIN HCl 1000 MG Oral Tab Take 1 tablet (1,000 mg total) by mouth 2 (two) times daily with meals. 180 tablet 1    losartan 100 MG Oral Tab Take 1 tablet (100 mg total) by mouth daily. 90 tablet 1    Cholecalciferol (VITAMIN D) 50 MCG (2000 UT) Oral Cap Take 1 capsule (2,000 Units total) by mouth daily.      ammonium lactate 12 % External Lotion APPLY 1 APPLICATION TOPICALLY DAILY FOR 60 DOSES. 400 g 5     Mycophenolate Mofetil (CELLCEPT) 500 MG Oral Tab Take 1 tablet (500 mg total) by mouth 2 (two) times daily. 60 tablet 0    Tri-Mix Standard (PPP) Injection Solution 0.2 mL by Intracavernosal route as needed.     Inject intracavernosally as directed prior to intercourse     Tri-Mix Standard Recipe:  - Prostaglandin E1 5.88 ug/ml  - Papaverine HCI 18mg/ ml  - Phentolamine Mesylate 0.6 mg/ ml      atorvastatin 20 MG Oral Tab Take 1 tablet (20 mg total) by mouth daily. 90 tablet 3    Psyllium (METAMUCIL) 0.36 g Oral Cap Take 1 capsule by mouth daily.      aspirin 81 MG Oral Tab EC Take 1 tablet (81 mg total) by mouth daily.         PHYSICAL EXAM:  Patient declined chaperone   General: awake, alert, no acute distress  Skin: Skin exam was performed today including the following: head and face, scalp, neck, chest (including breasts and axillae), abdomen, back, bilateral upper extremities, bilateral lower extremities, hands, feet, digits, nails. Pertinent findings include:   - Hands clear  - R foot clear  - temples and forehead with pink gritty papules     ASSESSMENT & PLAN:  Pathophysiology of diagnoses discussed with patient.  Therapeutic options reviewed. Risks, benefits, and alternatives discussed with patient. Instructions reviewed at length.    #Hand/Foot dermatitis, Clear today  #Medication monitoring  - Can hold cellcept to 1000mg twice daily  Risks, benefits, and alternatives discussed with patient.  - Labs today then repeat every 3 months    #Multiple actinic keratoses  - Discussed premalignant etiology and possibility of transformation to SCC  - Recommended cryotherapy today   - Discussed side effects including redness, swelling, crusting, and discolortion after treatment, wound care with soap/water and vaseline   - Recommend sun protection with spf 30 or higher, sun protective clothing such as wide brimmed hats and long sleeves. Recommend avoiding midday sun (10 am- 3 pm).     - Procedure Note Cryosurgery of  pre-malignant lesion(s)  Risks, benefits, alternatives, complications, and personnel required for cryosurgery reviewed with patient. Patient verbalizes understanding and wishes to proceed.   - Cryosurgery performed with Liquid Nitrogen via cryostat spray gun to Actinic Keratosis . 7 lesion(s) treated.   - Patient tolerated well and wound care discussed. Return if lesions fail to fully resolve.    #Seborrheic keratosis   - Reassured patient regarding benign nature of lesion. No treatment but observation at this time. Follow-up for concerning physical changes or new symptoms.       Return to clinic: 3 months or sooner if something concerning arises     Rafat French MD    By signing my name below, Nelson BRIONES MA,  attest that this documentation has been prepared under the direction and in the presence of Rafat French MD.   Electronically Signed: Nelson BRUNNER MA, 6/18/2025, 12:12 PM.    IRafat MD,  personally performed the services described in this documentation. All medical record entries made by the scribe were at my direction and in my presence.  I have reviewed the chart and agree that the record reflects my personal performance and is accurate and complete.  Rafat French MD, 6/18/2025, 12:55 PM

## 2025-08-04 ENCOUNTER — NURSE TRIAGE (OUTPATIENT)
Dept: INTERNAL MEDICINE CLINIC | Facility: CLINIC | Age: 75
End: 2025-08-04

## 2025-08-05 ENCOUNTER — OFFICE VISIT (OUTPATIENT)
Dept: INTERNAL MEDICINE CLINIC | Facility: CLINIC | Age: 75
End: 2025-08-05

## 2025-08-05 ENCOUNTER — LAB ENCOUNTER (OUTPATIENT)
Dept: LAB | Age: 75
End: 2025-08-05
Attending: STUDENT IN AN ORGANIZED HEALTH CARE EDUCATION/TRAINING PROGRAM

## 2025-08-05 VITALS
DIASTOLIC BLOOD PRESSURE: 72 MMHG | OXYGEN SATURATION: 95 % | SYSTOLIC BLOOD PRESSURE: 138 MMHG | BODY MASS INDEX: 35.4 KG/M2 | HEIGHT: 69 IN | HEART RATE: 69 BPM | TEMPERATURE: 98 F | WEIGHT: 239 LBS | RESPIRATION RATE: 18 BRPM

## 2025-08-05 DIAGNOSIS — E11.9 TYPE 2 DIABETES MELLITUS WITHOUT COMPLICATION, WITHOUT LONG-TERM CURRENT USE OF INSULIN (HCC): Primary | ICD-10-CM

## 2025-08-05 DIAGNOSIS — E11.9 TYPE 2 DIABETES MELLITUS WITHOUT COMPLICATION, WITHOUT LONG-TERM CURRENT USE OF INSULIN (HCC): ICD-10-CM

## 2025-08-05 LAB
ALBUMIN SERPL-MCNC: 4.9 G/DL (ref 3.2–4.8)
ALBUMIN/GLOB SERPL: 2 (ref 1–2)
ALP LIVER SERPL-CCNC: 61 U/L (ref 45–117)
ALT SERPL-CCNC: 22 U/L (ref 10–49)
ANION GAP SERPL CALC-SCNC: 8 MMOL/L (ref 0–18)
AST SERPL-CCNC: 15 U/L (ref ?–34)
BASOPHILS # BLD AUTO: 0.04 X10(3) UL (ref 0–0.2)
BASOPHILS NFR BLD AUTO: 0.5 %
BILIRUB SERPL-MCNC: 0.4 MG/DL (ref 0.2–1.1)
BUN BLD-MCNC: 14 MG/DL (ref 9–23)
BUN/CREAT SERPL: 13.7 (ref 10–20)
CALCIUM BLD-MCNC: 9.7 MG/DL (ref 8.7–10.4)
CHLORIDE SERPL-SCNC: 105 MMOL/L (ref 98–112)
CO2 SERPL-SCNC: 26 MMOL/L (ref 21–32)
CREAT BLD-MCNC: 1.02 MG/DL (ref 0.7–1.3)
DEPRECATED RDW RBC AUTO: 44.4 FL (ref 35.1–46.3)
EGFRCR SERPLBLD CKD-EPI 2021: 76 ML/MIN/1.73M2 (ref 60–?)
EOSINOPHIL # BLD AUTO: 0.24 X10(3) UL (ref 0–0.7)
EOSINOPHIL NFR BLD AUTO: 3 %
ERYTHROCYTE [DISTWIDTH] IN BLOOD BY AUTOMATED COUNT: 13.8 % (ref 11–15)
EST. AVERAGE GLUCOSE BLD GHB EST-MCNC: 166 MG/DL (ref 68–126)
FASTING STATUS PATIENT QL REPORTED: NO
GLOBULIN PLAS-MCNC: 2.4 G/DL (ref 2–3.5)
GLUCOSE BLD-MCNC: 157 MG/DL (ref 70–99)
HBA1C MFR BLD: 7.4 % (ref ?–5.7)
HCT VFR BLD AUTO: 44.6 % (ref 39–53)
HGB BLD-MCNC: 14.8 G/DL (ref 13–17.5)
IMM GRANULOCYTES # BLD AUTO: 0.04 X10(3) UL (ref 0–1)
IMM GRANULOCYTES NFR BLD: 0.5 %
LYMPHOCYTES # BLD AUTO: 2.8 X10(3) UL (ref 1–4)
LYMPHOCYTES NFR BLD AUTO: 34.8 %
MCH RBC QN AUTO: 29.2 PG (ref 26–34)
MCHC RBC AUTO-ENTMCNC: 33.2 G/DL (ref 31–37)
MCV RBC AUTO: 88 FL (ref 80–100)
MONOCYTES # BLD AUTO: 0.72 X10(3) UL (ref 0.1–1)
MONOCYTES NFR BLD AUTO: 8.9 %
NEUTROPHILS # BLD AUTO: 4.21 X10 (3) UL (ref 1.5–7.7)
NEUTROPHILS # BLD AUTO: 4.21 X10(3) UL (ref 1.5–7.7)
NEUTROPHILS NFR BLD AUTO: 52.3 %
OSMOLALITY SERPL CALC.SUM OF ELEC: 292 MOSM/KG (ref 275–295)
PLATELET # BLD AUTO: 186 10(3)UL (ref 150–450)
POTASSIUM SERPL-SCNC: 4.3 MMOL/L (ref 3.5–5.1)
PROT SERPL-MCNC: 7.3 G/DL (ref 5.7–8.2)
RBC # BLD AUTO: 5.07 X10(6)UL (ref 3.8–5.8)
SODIUM SERPL-SCNC: 139 MMOL/L (ref 136–145)
WBC # BLD AUTO: 8.1 X10(3) UL (ref 4–11)

## 2025-08-05 PROCEDURE — 83036 HEMOGLOBIN GLYCOSYLATED A1C: CPT

## 2025-08-05 PROCEDURE — 36415 COLL VENOUS BLD VENIPUNCTURE: CPT

## 2025-08-05 PROCEDURE — 85025 COMPLETE CBC W/AUTO DIFF WBC: CPT

## 2025-08-05 PROCEDURE — 99214 OFFICE O/P EST MOD 30 MIN: CPT | Performed by: STUDENT IN AN ORGANIZED HEALTH CARE EDUCATION/TRAINING PROGRAM

## 2025-08-05 PROCEDURE — 80053 COMPREHEN METABOLIC PANEL: CPT

## 2025-08-05 RX ORDER — ORAL SEMAGLUTIDE 3 MG/1
3 TABLET ORAL DAILY
Qty: 90 TABLET | Refills: 0 | Status: SHIPPED | OUTPATIENT
Start: 2025-08-05 | End: 2025-08-06

## 2025-08-06 ENCOUNTER — RESULTS FOLLOW-UP (OUTPATIENT)
Dept: INTERNAL MEDICINE CLINIC | Facility: CLINIC | Age: 75
End: 2025-08-06

## 2025-08-08 ENCOUNTER — TELEPHONE (OUTPATIENT)
Dept: INTERNAL MEDICINE CLINIC | Facility: CLINIC | Age: 75
End: 2025-08-08

## (undated) DIAGNOSIS — M19.031 ARTHRITIS OF RIGHT WRIST: ICD-10-CM

## (undated) DEVICE — ENDOSCOPY PACK UPPER: Brand: MEDLINE INDUSTRIES, INC.

## (undated) DEVICE — TOTAL KNEE: Brand: MEDLINE INDUSTRIES, INC.

## (undated) DEVICE — PROXIMATE SKIN STAPLERS (35 WIDE) CONTAINS 35 STAINLESS STEEL STAPLES (FIXED HEAD): Brand: PROXIMATE

## (undated) DEVICE — ZIMMER® STERILE DISPOSABLE TOURNIQUET CUFF WITH PLC, DUAL PORT, SINGLE BLADDER, 30 IN. (76 CM)

## (undated) DEVICE — 3M™ IOBAN™ 2 ANTIMICROBIAL INCISE DRAPE 6650EZ: Brand: IOBAN™ 2

## (undated) DEVICE — ENCORE® LATEX MICRO SIZE 7.5, STERILE LATEX POWDER-FREE SURGICAL GLOVE: Brand: ENCORE

## (undated) DEVICE — BLADE SW .5IN MCHC 2.75IN

## (undated) DEVICE — TRAY SKIN PREP PVP-1

## (undated) DEVICE — SUTURE ETHIBOND 2 V-37

## (undated) DEVICE — 48MM MG QUAD PIN-Z

## (undated) DEVICE — BATTERY

## (undated) DEVICE — SUTURE VICRYL 0 CP-1

## (undated) DEVICE — ENCORE® LATEX MICRO SIZE 8, STERILE LATEX POWDER-FREE SURGICAL GLOVE: Brand: ENCORE

## (undated) DEVICE — PAD THRP 16IN WRPON MU LNG STM

## (undated) DEVICE — Device

## (undated) DEVICE — TROCAR-Z

## (undated) DEVICE — POLAR CARE CUBE COOLING UNIT

## (undated) DEVICE — Device: Brand: DEFENDO AIR/WATER/SUCTION AND BIOPSY VALVE

## (undated) DEVICE — SOL  .9 1000ML BTL

## (undated) DEVICE — ENDOSCOPY PACK - LOWER: Brand: MEDLINE INDUSTRIES, INC.

## (undated) DEVICE — KIT DRN 1/8IN PVC 3 SPRG EVAC

## (undated) DEVICE — 25MM FEMALE SCREW-Z

## (undated) DEVICE — DRAPE SHEET LG

## (undated) DEVICE — CEMENT MIXING SYSTEM WITH FEMORAL BREAKWAY NOZZLE: Brand: REVOLUTION

## (undated) DEVICE — BLADE SAW SAGITTAL 19.5

## (undated) DEVICE — SUTURE VICRYL 2-0 FS-1

## (undated) DEVICE — FORCEP RADIAL JAW 4

## (undated) NOTE — LETTER
12/05/18        [address]  [facility] [address]      Dear Gianna Moss records indicate that you have outstanding lab work and or testing that was ordered for you and has not yet been completed:  Orders Placed This Encounter      CMP      TSH W Reflex To Free T4 [E]      CBC With Differential With Platelet      Lipid Panel [E]      Hemoglobin A1C [E]    To provide you with the best possible care, please complete these orders at your earliest convenience. If you have recently completed these orders please disregard this letter. If you have any questions please call the office at Dept: [phone].      Thank you,       Hattie Machado MD

## (undated) NOTE — Clinical Note
Condition update completed. Patient has no issues and is following up with Cardiology at this time. He will call back if there are any changes.  Not a TCM

## (undated) NOTE — LETTER
Patient Name: Aria Nicolas  : 3/29/1950  MRN: [id number]  Patient Address: [place] [address]      Coronavirus Disease 2019 (COVID-19)     [facility] is committed to the safety and well-being of our patients, members, e carefully. If your symptoms get worse, call your healthcare provider immediately. 3. Get rest and stay hydrated.    4. If you have a medical appointment, call the healthcare provider ahead of time and tell them that you have or may have COVID-19.  5. For m of fever-reducing medications; and  · Improvement in respiratory symptoms (e.g., cough, shortness of breath); and  · At least 10 days have passed since symptoms first appeared OR if asymptomatic patient or date of symptom onset is unclear then use 10 days donors must:    · Have had a confirmed diagnosis of COVID-19  · Be symptom-free for at least 14 days*    *Some people will be required to have a repeat COVID-19 test in order to be eligible to donate.  If you’re instructed by Jeff that a repeat test is r random. Researchers are trying to identify similarities between people with a Post-COVID condition to better understand if there are risk factors. How do I prevent a Post-COVID condition?   The best way to prevent the long-term symptoms of COVID-19 is

## (undated) NOTE — LETTER
04/10/20        [place] [address]      Dear Solange Menjivar records indicate that you have outstanding lab work and or testing that was ordered for you and has not yet been completed:  Orders Placed This Encounter

## (undated) NOTE — Clinical Note
Juan REHMAN has follow up on 3/17/2020, which is past the TCM timeframe, he declined sooner appt. TE sent.

## (undated) NOTE — MR AVS SNAPSHOT
[address] [address]  [phone]               Thank you for choosing us for your health care visit with Alexsandra Phan MD.  We are glad to serve you and happy to provide you with this summary [address] [address]   [phone]              Allergies as of Jan 12, 2017     No Known Allergies                Today's Vital Signs     BP Pulse Height Weight BMI    149/88 mmHg 62 5' 10\" (1.778 m) 238 lb Astrid Maria (INTERNAL) [[id number] CPT(R)]  Order #:  [id number]                  **REFERRAL REQUEST**    Your physician has referred you to a specialist.  Your physician or the clinic staff will provide you with the phone number you should call DASH eating plan Adopt a diet rich in fruits, vegetables, and low fat dairy products with reduced content of saturated and total fat.    Dietary sodium reduction Reduce dietary sodium intake to <= 100 mmol per day (2.4 g sodium or 6 g sodium chloride)   Aer

## (undated) NOTE — LETTER
[address], [place]  Authorization for Invasive Procedures  1.  I hereby authorize  Dr. Hooper Leader , my physician and whomever may be designated as the doctor's assistant, to perform the following operation and/or procedure: Cardiac 5. I consent to the photographing of the operations or procedures to be performed for the purposes of advancing medicine, science, and/or education, provided my identity is not revealed.  If the procedure has been videotaped, the physician/surgeon will obta __________ Time: ___________    Statement of Physician  My signature below affirms that prior to the time of the procedure, I have explained to the patient and/or his legal representative, the risks and benefits involved in the proposed treatment and any r

## (undated) NOTE — MR AVS SNAPSHOT
[address]  [phone]               Thank you for choosing us for your health care visit with Melanie Munoz MD.  We are glad to serve you and happy to provide you with this summar Desloratadine 5 MG Tabs   TAKE 1 TABLET BY MOUTH EVERY DAY   Commonly known as:  CLARINEX           Fenofibrate 160 MG Tabs   TAKE 1 TABLET BY MOUTH EVERY EVENING           Omeprazole 40 MG Cpdr   Take 1 capsule by mouth twice daily before breakfast and a active are less likely to develop some chronic diseases than adults who are inactive.      HOW TO GET STARTED: HOW TO STAY MOTIVATED:   Start activities slowly and build up over time Do what you like   Get your heart pumping – brisk walking, biking, swimmin

## (undated) NOTE — MR AVS SNAPSHOT
[address] [address]  [phone]               Thank you for choosing us for your health care visit with Raymond Nunez MD.  We are glad to serve you and happy to provide you with this summary warm water. Suck on throat lozenges and cough drops to moisten your throat. · Cough medicines are available but it is unclear how effective they actually are.   · Take acetaminophen or an NSAID, such as ibuprofen to ease throat pain  Ease digestive problem [phone]              Allergies as of Feb 07, 2017     No Known Allergies                Today's Vital Signs     BP Pulse Temp Height Weight BMI    146/94 mmHg 61 97.9 °F (36.6 °C) (Oral) 5' 10\" (1.778 m) 244 lb 6.4 oz (110.859 kg) 35.07 kg/m2 You can access your MyChart to more actively manage your health care and view more details from this visit by going to [email or web address].   If you've recently had a stay at the Hospital you can access your discharge instructions in [place] [facility] by cameron